# Patient Record
Sex: FEMALE | Race: WHITE | Employment: FULL TIME | URBAN - METROPOLITAN AREA
[De-identification: names, ages, dates, MRNs, and addresses within clinical notes are randomized per-mention and may not be internally consistent; named-entity substitution may affect disease eponyms.]

---

## 2016-10-20 LAB
ANTIBODY SCREEN, EXTERNAL: NEGATIVE
CHLAMYDIA, EXTERNAL: NEGATIVE
HBSAG, EXTERNAL: NEGATIVE
HIV, EXTERNAL: NEGATIVE
N. GONORRHEA, EXTERNAL: NEGATIVE
RPR, EXTERNAL: NORMAL
RUBELLA, EXTERNAL: NORMAL

## 2017-05-05 LAB — GRBS, EXTERNAL: NEGATIVE

## 2017-05-18 RX ORDER — FACIAL-BODY WIPES
10 EACH TOPICAL DAILY PRN
Status: CANCELLED | OUTPATIENT
Start: 2017-05-18

## 2017-05-18 RX ORDER — SODIUM CHLORIDE, SODIUM LACTATE, POTASSIUM CHLORIDE, CALCIUM CHLORIDE 600; 310; 30; 20 MG/100ML; MG/100ML; MG/100ML; MG/100ML
125 INJECTION, SOLUTION INTRAVENOUS CONTINUOUS
Status: CANCELLED | OUTPATIENT
Start: 2017-05-18 | End: 2017-05-19

## 2017-05-18 RX ORDER — OXYCODONE AND ACETAMINOPHEN 5; 325 MG/1; MG/1
1-2 TABLET ORAL
Status: CANCELLED | OUTPATIENT
Start: 2017-05-18

## 2017-05-18 RX ORDER — PROMETHAZINE HYDROCHLORIDE 25 MG/ML
25 INJECTION, SOLUTION INTRAMUSCULAR; INTRAVENOUS
Status: CANCELLED | OUTPATIENT
Start: 2017-05-18

## 2017-05-18 RX ORDER — OXYTOCIN/RINGER'S LACTATE 20/1000 ML
.5-2 PLASTIC BAG, INJECTION (ML) INTRAVENOUS CONTINUOUS
Status: CANCELLED | OUTPATIENT
Start: 2017-06-01

## 2017-05-18 RX ORDER — SODIUM CHLORIDE 0.9 % (FLUSH) 0.9 %
5-10 SYRINGE (ML) INJECTION EVERY 8 HOURS
Status: CANCELLED | OUTPATIENT
Start: 2017-05-18

## 2017-05-18 RX ORDER — ZOLPIDEM TARTRATE 5 MG/1
5 TABLET ORAL
Status: CANCELLED | OUTPATIENT
Start: 2017-05-18

## 2017-05-18 RX ORDER — SIMETHICONE 80 MG
80 TABLET,CHEWABLE ORAL
Status: CANCELLED | OUTPATIENT
Start: 2017-05-18

## 2017-05-18 RX ORDER — SODIUM CHLORIDE, SODIUM LACTATE, POTASSIUM CHLORIDE, CALCIUM CHLORIDE 600; 310; 30; 20 MG/100ML; MG/100ML; MG/100ML; MG/100ML
125 INJECTION, SOLUTION INTRAVENOUS CONTINUOUS
Status: CANCELLED | OUTPATIENT
Start: 2017-06-01

## 2017-05-18 RX ORDER — CEFAZOLIN SODIUM 2 G/50ML
2 SOLUTION INTRAVENOUS ONCE
Status: CANCELLED | OUTPATIENT
Start: 2017-06-01 | End: 2017-06-01

## 2017-05-18 RX ORDER — IBUPROFEN 400 MG/1
800 TABLET ORAL
Status: CANCELLED | OUTPATIENT
Start: 2017-05-21

## 2017-05-18 RX ORDER — ACETAMINOPHEN 325 MG/1
650 TABLET ORAL
Status: CANCELLED | OUTPATIENT
Start: 2017-05-18

## 2017-05-18 RX ORDER — SODIUM CHLORIDE 0.9 % (FLUSH) 0.9 %
5-10 SYRINGE (ML) INJECTION AS NEEDED
Status: CANCELLED | OUTPATIENT
Start: 2017-05-18

## 2017-05-18 RX ORDER — KETOROLAC TROMETHAMINE 30 MG/ML
30 INJECTION, SOLUTION INTRAMUSCULAR; INTRAVENOUS EVERY 6 HOURS
Status: CANCELLED | OUTPATIENT
Start: 2017-05-18 | End: 2017-05-20

## 2017-06-01 ENCOUNTER — ANESTHESIA (OUTPATIENT)
Dept: LABOR AND DELIVERY | Age: 32
End: 2017-06-01
Payer: COMMERCIAL

## 2017-06-01 ENCOUNTER — ANESTHESIA EVENT (OUTPATIENT)
Dept: LABOR AND DELIVERY | Age: 32
End: 2017-06-01
Payer: COMMERCIAL

## 2017-06-01 ENCOUNTER — HOSPITAL ENCOUNTER (INPATIENT)
Age: 32
LOS: 2 days | Discharge: HOME | End: 2017-06-03
Attending: OBSTETRICS & GYNECOLOGY | Admitting: OBSTETRICS & GYNECOLOGY
Payer: COMMERCIAL

## 2017-06-01 PROBLEM — O34.219 PREVIOUS CESAREAN SECTION COMPLICATING PREGNANCY: Status: ACTIVE | Noted: 2017-06-01

## 2017-06-01 PROBLEM — D69.6 THROMBOCYTOPENIA (HCC): Status: ACTIVE | Noted: 2017-06-01

## 2017-06-01 LAB
ABO + RH BLD: NORMAL
BASOPHILS # BLD AUTO: 0 K/UL (ref 0–0.06)
BASOPHILS # BLD AUTO: 0 K/UL (ref 0–0.06)
BASOPHILS # BLD: 0 % (ref 0–2)
BASOPHILS # BLD: 0 % (ref 0–2)
BLOOD GROUP ANTIBODIES SERPL: NORMAL
DIFFERENTIAL METHOD BLD: ABNORMAL
DIFFERENTIAL METHOD BLD: ABNORMAL
EOSINOPHIL # BLD: 0.1 K/UL (ref 0–0.4)
EOSINOPHIL # BLD: 0.2 K/UL (ref 0–0.4)
EOSINOPHIL NFR BLD: 1 % (ref 0–5)
EOSINOPHIL NFR BLD: 2 % (ref 0–5)
ERYTHROCYTE [DISTWIDTH] IN BLOOD BY AUTOMATED COUNT: 14.3 % (ref 11.6–14.5)
ERYTHROCYTE [DISTWIDTH] IN BLOOD BY AUTOMATED COUNT: 14.4 % (ref 11.6–14.5)
HCT VFR BLD AUTO: 34.3 % (ref 35–45)
HCT VFR BLD AUTO: 35.3 % (ref 35–45)
HGB BLD-MCNC: 11.7 G/DL (ref 12–16)
HGB BLD-MCNC: 12.1 G/DL (ref 12–16)
LYMPHOCYTES # BLD AUTO: 14 % (ref 21–52)
LYMPHOCYTES # BLD AUTO: 8 % (ref 21–52)
LYMPHOCYTES # BLD: 1 K/UL (ref 0.9–3.6)
LYMPHOCYTES # BLD: 1.6 K/UL (ref 0.9–3.6)
MCH RBC QN AUTO: 32.1 PG (ref 24–34)
MCH RBC QN AUTO: 32.5 PG (ref 24–34)
MCHC RBC AUTO-ENTMCNC: 34.1 G/DL (ref 31–37)
MCHC RBC AUTO-ENTMCNC: 34.3 G/DL (ref 31–37)
MCV RBC AUTO: 94.2 FL (ref 74–97)
MCV RBC AUTO: 94.9 FL (ref 74–97)
MONOCYTES # BLD: 0.2 K/UL (ref 0.05–1.2)
MONOCYTES # BLD: 0.6 K/UL (ref 0.05–1.2)
MONOCYTES NFR BLD AUTO: 2 % (ref 3–10)
MONOCYTES NFR BLD AUTO: 6 % (ref 3–10)
NEUTS SEG # BLD: 11.3 K/UL (ref 1.8–8)
NEUTS SEG # BLD: 8.9 K/UL (ref 1.8–8)
NEUTS SEG NFR BLD AUTO: 78 % (ref 40–73)
NEUTS SEG NFR BLD AUTO: 89 % (ref 40–73)
PLATELET # BLD AUTO: 67 K/UL (ref 135–420)
PLATELET # BLD AUTO: 71 K/UL (ref 135–420)
PMV BLD AUTO: 10.3 FL (ref 9.2–11.8)
PMV BLD AUTO: 10.6 FL (ref 9.2–11.8)
RBC # BLD AUTO: 3.64 M/UL (ref 4.2–5.3)
RBC # BLD AUTO: 3.72 M/UL (ref 4.2–5.3)
SPECIMEN EXP DATE BLD: NORMAL
WBC # BLD AUTO: 11.4 K/UL (ref 4.6–13.2)
WBC # BLD AUTO: 12.6 K/UL (ref 4.6–13.2)

## 2017-06-01 PROCEDURE — 74011250636 HC RX REV CODE- 250/636

## 2017-06-01 PROCEDURE — 86900 BLOOD TYPING SEROLOGIC ABO: CPT | Performed by: OBSTETRICS & GYNECOLOGY

## 2017-06-01 PROCEDURE — 77030031139 HC SUT VCRL2 J&J -A: Performed by: OBSTETRICS & GYNECOLOGY

## 2017-06-01 PROCEDURE — 77030032490 HC SLV COMPR SCD KNE COVD -B: Performed by: OBSTETRICS & GYNECOLOGY

## 2017-06-01 PROCEDURE — 77030011640 HC PAD GRND REM COVD -A: Performed by: OBSTETRICS & GYNECOLOGY

## 2017-06-01 PROCEDURE — 59025 FETAL NON-STRESS TEST: CPT

## 2017-06-01 PROCEDURE — 74011250636 HC RX REV CODE- 250/636: Performed by: ANESTHESIOLOGY

## 2017-06-01 PROCEDURE — 74011250636 HC RX REV CODE- 250/636: Performed by: OBSTETRICS & GYNECOLOGY

## 2017-06-01 PROCEDURE — 74011000250 HC RX REV CODE- 250

## 2017-06-01 PROCEDURE — 77030008462 HC STPLR SKN PROX J&J -A: Performed by: OBSTETRICS & GYNECOLOGY

## 2017-06-01 PROCEDURE — 76010000391 HC C SECN FIRST 1 HR: Performed by: OBSTETRICS & GYNECOLOGY

## 2017-06-01 PROCEDURE — 74011250637 HC RX REV CODE- 250/637

## 2017-06-01 PROCEDURE — 77030013079 HC BLNKT BAIR HGGR 3M -A: Performed by: ANESTHESIOLOGY

## 2017-06-01 PROCEDURE — 36415 COLL VENOUS BLD VENIPUNCTURE: CPT | Performed by: OBSTETRICS & GYNECOLOGY

## 2017-06-01 PROCEDURE — 85025 COMPLETE CBC W/AUTO DIFF WBC: CPT | Performed by: OBSTETRICS & GYNECOLOGY

## 2017-06-01 PROCEDURE — 76060000032 HC ANESTHESIA 0.5 TO 1 HR: Performed by: OBSTETRICS & GYNECOLOGY

## 2017-06-01 PROCEDURE — 75410000003 HC RECOV DEL/VAG/CSECN EA 0.5 HR

## 2017-06-01 PROCEDURE — 77030008683 HC TU ET CUF COVD -A: Performed by: ANESTHESIOLOGY

## 2017-06-01 PROCEDURE — 77030034849

## 2017-06-01 PROCEDURE — 77030033269 HC SLV COMPR SCD KNE2 CARD -B

## 2017-06-01 PROCEDURE — 75410000003 HC RECOV DEL/VAG/CSECN EA 0.5 HR: Performed by: OBSTETRICS & GYNECOLOGY

## 2017-06-01 PROCEDURE — 65270000029 HC RM PRIVATE

## 2017-06-01 RX ORDER — SODIUM CHLORIDE 0.9 % (FLUSH) 0.9 %
5-10 SYRINGE (ML) INJECTION AS NEEDED
Status: DISCONTINUED | OUTPATIENT
Start: 2017-06-01 | End: 2017-06-03

## 2017-06-01 RX ORDER — PROPOFOL 10 MG/ML
INJECTION, EMULSION INTRAVENOUS AS NEEDED
Status: DISCONTINUED | OUTPATIENT
Start: 2017-06-01 | End: 2017-06-01 | Stop reason: HOSPADM

## 2017-06-01 RX ORDER — FACIAL-BODY WIPES
10 EACH TOPICAL
Status: DISCONTINUED | OUTPATIENT
Start: 2017-06-01 | End: 2017-06-03 | Stop reason: HOSPADM

## 2017-06-01 RX ORDER — OXYTOCIN/RINGER'S LACTATE 20/1000 ML
PLASTIC BAG, INJECTION (ML) INTRAVENOUS
Status: DISCONTINUED | OUTPATIENT
Start: 2017-06-01 | End: 2017-06-01 | Stop reason: HOSPADM

## 2017-06-01 RX ORDER — SODIUM CHLORIDE, SODIUM LACTATE, POTASSIUM CHLORIDE, CALCIUM CHLORIDE 600; 310; 30; 20 MG/100ML; MG/100ML; MG/100ML; MG/100ML
125 INJECTION, SOLUTION INTRAVENOUS CONTINUOUS
Status: DISPENSED | OUTPATIENT
Start: 2017-06-01 | End: 2017-06-02

## 2017-06-01 RX ORDER — SODIUM CHLORIDE, SODIUM LACTATE, POTASSIUM CHLORIDE, CALCIUM CHLORIDE 600; 310; 30; 20 MG/100ML; MG/100ML; MG/100ML; MG/100ML
125 INJECTION, SOLUTION INTRAVENOUS CONTINUOUS
Status: DISCONTINUED | OUTPATIENT
Start: 2017-06-01 | End: 2017-06-03 | Stop reason: HOSPADM

## 2017-06-01 RX ORDER — OXYTOCIN/RINGER'S LACTATE 20/1000 ML
125 PLASTIC BAG, INJECTION (ML) INTRAVENOUS CONTINUOUS
Status: DISCONTINUED | OUTPATIENT
Start: 2017-06-01 | End: 2017-06-03 | Stop reason: HOSPADM

## 2017-06-01 RX ORDER — SODIUM CHLORIDE 0.9 % (FLUSH) 0.9 %
5-10 SYRINGE (ML) INJECTION EVERY 8 HOURS
Status: DISCONTINUED | OUTPATIENT
Start: 2017-06-01 | End: 2017-06-03

## 2017-06-01 RX ORDER — TRISODIUM CITRATE DIHYDRATE AND CITRIC ACID MONOHYDRATE 500; 334 MG/5ML; MG/5ML
SOLUTION ORAL AS NEEDED
Status: DISCONTINUED | OUTPATIENT
Start: 2017-06-01 | End: 2017-06-01 | Stop reason: HOSPADM

## 2017-06-01 RX ORDER — ONDANSETRON 2 MG/ML
INJECTION INTRAMUSCULAR; INTRAVENOUS
Status: DISPENSED
Start: 2017-06-01 | End: 2017-06-02

## 2017-06-01 RX ORDER — FENTANYL CITRATE 50 UG/ML
INJECTION, SOLUTION INTRAMUSCULAR; INTRAVENOUS AS NEEDED
Status: DISCONTINUED | OUTPATIENT
Start: 2017-06-01 | End: 2017-06-01 | Stop reason: HOSPADM

## 2017-06-01 RX ORDER — VALACYCLOVIR HYDROCHLORIDE 1 G/1
TABLET, FILM COATED ORAL DAILY
COMMUNITY
End: 2017-06-03

## 2017-06-01 RX ORDER — OXYCODONE AND ACETAMINOPHEN 5; 325 MG/1; MG/1
1-2 TABLET ORAL
Status: DISCONTINUED | OUTPATIENT
Start: 2017-06-01 | End: 2017-06-03 | Stop reason: HOSPADM

## 2017-06-01 RX ORDER — DEXTROSE 50 % IN WATER (D50W) INTRAVENOUS SYRINGE
25-50 AS NEEDED
Status: CANCELLED | OUTPATIENT
Start: 2017-06-01

## 2017-06-01 RX ORDER — ALBUTEROL SULFATE 0.83 MG/ML
2.5 SOLUTION RESPIRATORY (INHALATION) AS NEEDED
Status: CANCELLED | OUTPATIENT
Start: 2017-06-01

## 2017-06-01 RX ORDER — MAGNESIUM SULFATE 100 %
4 CRYSTALS MISCELLANEOUS AS NEEDED
Status: CANCELLED | OUTPATIENT
Start: 2017-06-01

## 2017-06-01 RX ORDER — PROMETHAZINE HYDROCHLORIDE 25 MG/ML
25 INJECTION, SOLUTION INTRAMUSCULAR; INTRAVENOUS
Status: DISCONTINUED | OUTPATIENT
Start: 2017-06-01 | End: 2017-06-03 | Stop reason: HOSPADM

## 2017-06-01 RX ORDER — KETAMINE HYDROCHLORIDE 50 MG/ML
INJECTION, SOLUTION INTRAMUSCULAR; INTRAVENOUS AS NEEDED
Status: DISCONTINUED | OUTPATIENT
Start: 2017-06-01 | End: 2017-06-01 | Stop reason: HOSPADM

## 2017-06-01 RX ORDER — ONDANSETRON 2 MG/ML
4 INJECTION INTRAMUSCULAR; INTRAVENOUS ONCE
Status: COMPLETED | OUTPATIENT
Start: 2017-06-01 | End: 2017-06-01

## 2017-06-01 RX ORDER — SODIUM CHLORIDE 0.9 % (FLUSH) 0.9 %
5-10 SYRINGE (ML) INJECTION AS NEEDED
Status: CANCELLED | OUTPATIENT
Start: 2017-06-01

## 2017-06-01 RX ORDER — HYDROCODONE BITARTRATE AND ACETAMINOPHEN 5; 325 MG/1; MG/1
1 TABLET ORAL AS NEEDED
Status: CANCELLED | OUTPATIENT
Start: 2017-06-01

## 2017-06-01 RX ORDER — KETOROLAC TROMETHAMINE 30 MG/ML
30 INJECTION, SOLUTION INTRAMUSCULAR; INTRAVENOUS EVERY 6 HOURS
Status: DISCONTINUED | OUTPATIENT
Start: 2017-06-01 | End: 2017-06-03 | Stop reason: HOSPADM

## 2017-06-01 RX ORDER — ACETAMINOPHEN 325 MG/1
650 TABLET ORAL
Status: DISCONTINUED | OUTPATIENT
Start: 2017-06-01 | End: 2017-06-03 | Stop reason: HOSPADM

## 2017-06-01 RX ORDER — LIDOCAINE HYDROCHLORIDE 20 MG/ML
INJECTION, SOLUTION EPIDURAL; INFILTRATION; INTRACAUDAL; PERINEURAL AS NEEDED
Status: DISCONTINUED | OUTPATIENT
Start: 2017-06-01 | End: 2017-06-01 | Stop reason: HOSPADM

## 2017-06-01 RX ORDER — NALOXONE HYDROCHLORIDE 0.4 MG/ML
0.1 INJECTION, SOLUTION INTRAMUSCULAR; INTRAVENOUS; SUBCUTANEOUS AS NEEDED
Status: DISCONTINUED | OUTPATIENT
Start: 2017-06-01 | End: 2017-06-03 | Stop reason: HOSPADM

## 2017-06-01 RX ORDER — ONDANSETRON 2 MG/ML
INJECTION INTRAMUSCULAR; INTRAVENOUS AS NEEDED
Status: DISCONTINUED | OUTPATIENT
Start: 2017-06-01 | End: 2017-06-01 | Stop reason: HOSPADM

## 2017-06-01 RX ORDER — ZOLPIDEM TARTRATE 5 MG/1
5 TABLET ORAL
Status: DISCONTINUED | OUTPATIENT
Start: 2017-06-01 | End: 2017-06-03 | Stop reason: HOSPADM

## 2017-06-01 RX ORDER — SIMETHICONE 80 MG
80 TABLET,CHEWABLE ORAL
Status: DISCONTINUED | OUTPATIENT
Start: 2017-06-01 | End: 2017-06-03 | Stop reason: HOSPADM

## 2017-06-01 RX ORDER — FENTANYL CITRATE 50 UG/ML
25 INJECTION, SOLUTION INTRAMUSCULAR; INTRAVENOUS
Status: DISCONTINUED | OUTPATIENT
Start: 2017-06-01 | End: 2017-06-03 | Stop reason: HOSPADM

## 2017-06-01 RX ORDER — DIPHENHYDRAMINE HYDROCHLORIDE 50 MG/ML
12.5 INJECTION, SOLUTION INTRAMUSCULAR; INTRAVENOUS
Status: DISCONTINUED | OUTPATIENT
Start: 2017-06-01 | End: 2017-06-03 | Stop reason: HOSPADM

## 2017-06-01 RX ORDER — DEXAMETHASONE SODIUM PHOSPHATE 4 MG/ML
10 INJECTION, SOLUTION INTRA-ARTICULAR; INTRALESIONAL; INTRAMUSCULAR; INTRAVENOUS; SOFT TISSUE ONCE
Status: COMPLETED | OUTPATIENT
Start: 2017-06-01 | End: 2017-06-01

## 2017-06-01 RX ORDER — KETOROLAC TROMETHAMINE 30 MG/ML
INJECTION, SOLUTION INTRAMUSCULAR; INTRAVENOUS AS NEEDED
Status: DISCONTINUED | OUTPATIENT
Start: 2017-06-01 | End: 2017-06-01 | Stop reason: HOSPADM

## 2017-06-01 RX ORDER — INSULIN LISPRO 100 [IU]/ML
INJECTION, SOLUTION INTRAVENOUS; SUBCUTANEOUS ONCE
Status: CANCELLED | OUTPATIENT
Start: 2017-06-01 | End: 2017-06-02

## 2017-06-01 RX ORDER — SODIUM CHLORIDE, SODIUM LACTATE, POTASSIUM CHLORIDE, CALCIUM CHLORIDE 600; 310; 30; 20 MG/100ML; MG/100ML; MG/100ML; MG/100ML
150 INJECTION, SOLUTION INTRAVENOUS CONTINUOUS
Status: CANCELLED | OUTPATIENT
Start: 2017-06-01

## 2017-06-01 RX ORDER — METOCLOPRAMIDE HYDROCHLORIDE 5 MG/ML
INJECTION INTRAMUSCULAR; INTRAVENOUS AS NEEDED
Status: DISCONTINUED | OUTPATIENT
Start: 2017-06-01 | End: 2017-06-01 | Stop reason: HOSPADM

## 2017-06-01 RX ORDER — DIPHENHYDRAMINE HYDROCHLORIDE 50 MG/ML
12.5 INJECTION, SOLUTION INTRAMUSCULAR; INTRAVENOUS
Status: CANCELLED | OUTPATIENT
Start: 2017-06-01

## 2017-06-01 RX ORDER — CEFAZOLIN SODIUM 2 G/50ML
2 SOLUTION INTRAVENOUS ONCE
Status: COMPLETED | OUTPATIENT
Start: 2017-06-01 | End: 2017-06-01

## 2017-06-01 RX ORDER — IBUPROFEN 400 MG/1
800 TABLET ORAL
Status: DISCONTINUED | OUTPATIENT
Start: 2017-06-04 | End: 2017-06-03 | Stop reason: HOSPADM

## 2017-06-01 RX ORDER — SUCCINYLCHOLINE CHLORIDE 20 MG/ML
INJECTION INTRAMUSCULAR; INTRAVENOUS AS NEEDED
Status: DISCONTINUED | OUTPATIENT
Start: 2017-06-01 | End: 2017-06-01 | Stop reason: HOSPADM

## 2017-06-01 RX ORDER — NALOXONE HYDROCHLORIDE 0.4 MG/ML
0.1 INJECTION, SOLUTION INTRAMUSCULAR; INTRAVENOUS; SUBCUTANEOUS AS NEEDED
Status: CANCELLED | OUTPATIENT
Start: 2017-06-01

## 2017-06-01 RX ADMIN — FENTANYL CITRATE 100 MCG: 50 INJECTION, SOLUTION INTRAMUSCULAR; INTRAVENOUS at 13:53

## 2017-06-01 RX ADMIN — PROPOFOL 180 MG: 10 INJECTION, EMULSION INTRAVENOUS at 13:16

## 2017-06-01 RX ADMIN — FENTANYL CITRATE 100 MCG: 50 INJECTION, SOLUTION INTRAMUSCULAR; INTRAVENOUS at 13:22

## 2017-06-01 RX ADMIN — KETOROLAC TROMETHAMINE 30 MG: 30 INJECTION, SOLUTION INTRAMUSCULAR at 21:16

## 2017-06-01 RX ADMIN — FENTANYL CITRATE 25 MCG: 50 INJECTION, SOLUTION INTRAMUSCULAR; INTRAVENOUS at 16:56

## 2017-06-01 RX ADMIN — DEXAMETHASONE SODIUM PHOSPHATE 10 MG: 4 INJECTION, SOLUTION INTRAMUSCULAR; INTRAVENOUS at 09:41

## 2017-06-01 RX ADMIN — Medication: at 14:08

## 2017-06-01 RX ADMIN — CEFAZOLIN SODIUM 2 G: 2 SOLUTION INTRAVENOUS at 13:09

## 2017-06-01 RX ADMIN — METOCLOPRAMIDE HYDROCHLORIDE 10 MG: 5 INJECTION INTRAMUSCULAR; INTRAVENOUS at 13:10

## 2017-06-01 RX ADMIN — TRISODIUM CITRATE DIHYDRATE AND CITRIC ACID MONOHYDRATE 30 ML: 500; 334 SOLUTION ORAL at 13:07

## 2017-06-01 RX ADMIN — KETAMINE HYDROCHLORIDE 30 MG: 50 INJECTION, SOLUTION INTRAMUSCULAR; INTRAVENOUS at 13:18

## 2017-06-01 RX ADMIN — LIDOCAINE HYDROCHLORIDE 70 MG: 20 INJECTION, SOLUTION EPIDURAL; INFILTRATION; INTRACAUDAL; PERINEURAL at 13:06

## 2017-06-01 RX ADMIN — ONDANSETRON 4 MG: 2 INJECTION INTRAMUSCULAR; INTRAVENOUS at 18:37

## 2017-06-01 RX ADMIN — HYDROMORPHONE HYDROCHLORIDE: 10 INJECTION, SOLUTION INTRAMUSCULAR; INTRAVENOUS; SUBCUTANEOUS at 14:05

## 2017-06-01 RX ADMIN — PROPOFOL 20 MG: 10 INJECTION, EMULSION INTRAVENOUS at 13:19

## 2017-06-01 RX ADMIN — SUCCINYLCHOLINE CHLORIDE 140 MG: 20 INJECTION INTRAMUSCULAR; INTRAVENOUS at 13:17

## 2017-06-01 RX ADMIN — SODIUM CHLORIDE, SODIUM LACTATE, POTASSIUM CHLORIDE, AND CALCIUM CHLORIDE 1000 ML: 600; 310; 30; 20 INJECTION, SOLUTION INTRAVENOUS at 11:46

## 2017-06-01 RX ADMIN — ONDANSETRON 4 MG: 2 INJECTION INTRAMUSCULAR; INTRAVENOUS at 13:27

## 2017-06-01 RX ADMIN — Medication: at 13:22

## 2017-06-01 RX ADMIN — SODIUM CHLORIDE, SODIUM LACTATE, POTASSIUM CHLORIDE, AND CALCIUM CHLORIDE 125 ML/HR: 600; 310; 30; 20 INJECTION, SOLUTION INTRAVENOUS at 18:33

## 2017-06-01 RX ADMIN — KETOROLAC TROMETHAMINE 30 MG: 30 INJECTION, SOLUTION INTRAMUSCULAR; INTRAVENOUS at 13:53

## 2017-06-01 RX ADMIN — FENTANYL CITRATE 150 MCG: 50 INJECTION, SOLUTION INTRAMUSCULAR; INTRAVENOUS at 13:29

## 2017-06-01 RX ADMIN — SODIUM CHLORIDE, SODIUM LACTATE, POTASSIUM CHLORIDE, AND CALCIUM CHLORIDE: 600; 310; 30; 20 INJECTION, SOLUTION INTRAVENOUS at 13:00

## 2017-06-01 NOTE — PROGRESS NOTES
8752 patient taken from the waiting room to triage 3 for lab work prior to scheduled . 9925 Dr. August Naranjo paged. 9747 Dr. August Naranjo informed of platelets. Orders received for 10mg IVPUSH decadron once, CBC to be drawn at 1200    0910 patient informed of platelet levels. POC is to give her 10mg decadron IVPUSH now and recheck platelets at 7502.     0950 Dr. Kristen Chen at bedside. Risks and benefits of spinal anesthesia, Thromboelastogram, and waiting 24 hours. 9[address] Dr. August Naranjo called unit. MD informed of discussion with Dr. Kristen Chen, option to postpone section for 24 hours to do TEG. MD would like to do  today, patient will go under general if necessary. 1015 Patient informed of options, plan to do  today. 1 Dr. Kristen Chen text paged to plan for  as scheduled. 1122 patient ambulates to LD room 4. CHG wipes used on abdomen. 1215 patient resting, questions answered. Denies needs. 1258 Dr. August Naranjo at bedside. 1303 patient ambulates to OR 2    1438 Patient returned to LD room 4, from PACU.     1450  Patient eating ice chips, tolerating well, denies Nausea    1545 patient resting, infant feeding. Denies needs    1634 Dr. Michelle Quinn paged    2980 Dr. Michelle Quinn informed of pain 6/10 with PCA. Dr. Michelle Quinn states she is going to put in fentanyl IV push orders. 1735 pericare provided, pads changed. 1820 pericare provided. Pads changed    1840 TRANSFER - OUT REPORT:    Verbal report given to KOSTA Saldivar RN (name) on Arlene Dickerson  being transferred to [address] [place] [address] (unit) for routine progression of care       Report consisted of patients Situation, Background, Assessment and   Recommendations(SBAR). Information from the following report(s) SBAR, Kardex, Intake/Output and MAR was reviewed with the receiving nurse.     Lines:   Peripheral IV 17 Left Forearm (Active)   Site Assessment Clean, dry, & intact 2017  2:27 PM   Phlebitis Assessment 0 2017  2:27 PM Infiltration Assessment 0 6/1/2017  2:27 PM   Dressing Status Clean, dry, & intact 6/1/2017  2:27 PM   Dressing Type Tape 6/1/2017  2:27 PM   Hub Color/Line Status Infusing 6/1/2017  2:27 PM   Alcohol Cap Used Yes 6/1/2017  9:18 AM        Opportunity for questions and clarification was provided.       Patient transported with:   Registered Nurse

## 2017-06-01 NOTE — OP NOTES
Section Delivery Procedure Note    Name: Lisa Mathis   Medical Record Number: [id number]   YOB: 1985  Today's Date: 2017      Preoperative Diagnosis: PREVIOUS  SECTION    Postoperative Diagnosis: PREVIOUS  SECTION    Procedure: Low Cervical Transverse Procedure(s):  REPEAT  SECTION    Surgeon(s):  Jyoti Forte MD    Anesthesia: General    Prophylactic Antibiotics: Ancef pre-op  2 gm. Procedure Details:    Patient was induced under general anesthetic, placed supine, pillow under right hip, scrubbed and draped. She was checked for adequate anesthesia. Pfannenstiel incision was made in the skin, extended through the subcutaneous tissue and the anterior rectus sheath. The rectus muscles were  and a longitudinal incision was made in the parietal peritoneum preserving the bladder. Bladder blade was positioned. Transverse incision is made in the lower segment of the uterus after first carefully locating the position of the lower segment. Baby is delivered through this incision. No traction is placed upon the fetal head during this delivery. After a 20 seconds delay to allow equilibration of blood volume between baby and placenta, cord is clamped and cut. Baby is handed off to waiting pediatric expert, after initial resuscitation with bulb suction while we waited for the cord to be clamped and cut. Placenta was now delivered manually, and the uterus was delivered out of the pelvic cavity and packs placed to prevent blood flow upwards. Uterine cavity was carefully cleaned with a dry pack and inspected to be sure that it was empty. Myometrium was closed in two layers, the first running locking and the second running imbricating. A further suture was placed in the serosa to hold the incision together and to achieve hemostasis and close the serosa.  Once we were sure that hemostasis had been achieved, the uterus was returned to the peritoneal cavity. Blood and clot was cleared from peritoneum. Sponge and instrument count was now obtained, and after this was returned as normal, we closed the parietal peritoneum with a running suture. Careful hemostasis was achieved in the rectus sheath, and this was closed with a running suture. Careful hemostasis was obtained with cautery in the subcutaneous tissue, subcutaneous suture placed, and the skin was closed with absaorbable staples. Sterile dressing was applied, patient was recovered and sent to recovery room in good condition. Note: all sutures used throughout were number 1 Vicryl. Sponge and needle correct postop as per nursing staff.         Estimated Blood Loss: 500  Replacement: 0    Fetal Description: hood female, yomi breech    Apgar - One Minute: 7    Apgar - Five Minutes: 8    Umbilical Cord: 3 vessels present             Cord Blood Results:   Information for the patient's :  Guillermina Gonzalez [[id number]]   No results found for: PCTABR, PCTDIG, BILI, ABORH         Birth Information:   Information for the patient's :  Guillermina Gonzalez [[id number]]   One Minute Apgar:  (Filed from Delivery Summary)  Five  Minute Apgar:  (Filed from Delivery Summary)      Specimens: * No specimens in log *       Maternal Findings    Uterus Size: normal, Fibroids: no , Adhesions: {Minimal,    Tubes normal   Ovaries normal   Abdomen Adhesions: None   Subcutaneous thickness 2 cm            Complications:  none    Birth Weight: 7lb 8oz    Mother's Condition: good  Baby's Condition: good    Signed: Codie Billings MD      2017

## 2017-06-01 NOTE — PROGRESS NOTES
Bedside shift change report given to VANIA Flor RN (oncoming nurse) by KOSTA Kulkarni RN (offgoing nurse). Report included the following information SBAR and Kardex.

## 2017-06-01 NOTE — IP AVS SNAPSHOT
Current Discharge Medication List  
  
ASK your doctor about these medications Dose & Instructions Dispensing Information Comments Morning Noon Evening Bedtime  
 cyanocobalamin 100 mcg tablet Commonly known as:  VITAMIN B12 Your last dose was: Your next dose is:    
   
   
 Dose:  100 mcg Take 100 mcg by mouth daily. Indications: PREVENTION OF VITAMIN B12 DEFICIENCY Refills:  0  
     
   
   
   
  
 ferrous sulfate 325 mg (65 mg iron) tablet Your last dose was: Your next dose is:    
   
   
 Dose:  325 mg Take 325 mg by mouth two (2) times a day. Indications: IRON DEFICIENCY ANEMIA Refills:  0  
     
   
   
   
  
 OTHER Your last dose was: Your next dose is: Take  by mouth. Refills:  0  
     
   
   
   
  
 PNV with Ca No.65-Iron Poly-FA 60 mg iron-1 mg Cap Your last dose was: Your next dose is:    
   
   
 Dose:  1 Tab Take 1 Tab by mouth daily. Indications: PREGNANCY Refills:  0 VALTREX 1 gram tablet Generic drug:  valACYclovir Your last dose was: Your next dose is: Take  by mouth daily. Refills:  0  
     
   
   
   
  
 VITAMIN C 500 mg tablet Generic drug:  ascorbic acid (vitamin C) Your last dose was: Your next dose is:    
   
   
 Dose:  500 mg Take 500 mg by mouth daily. Refills:  0  
     
   
   
   
  
 VITAMIN D3 2,000 unit Tab Generic drug:  cholecalciferol (vitamin D3) Your last dose was: Your next dose is:    
   
   
 Dose:  1 Tab Take 1 Tab by mouth. Indications: PREVENTION OF VITAMIN D DEFICIENCY Refills:  0

## 2017-06-01 NOTE — H&P
Ostetrical History and Physical    Subjective:     Date of Admission: 2017    Patient is a 36 y.o.   female admitted with previous CS for elective repeat. Note low platelet count. For Obstetric history, see prenantal.    For Review of Systems, see prenatal    Past Medical History:   Diagnosis Date    Abnormal Papanicolaou smear of cervix     Anemia     Coagulation disorder (HCC)     thrombocytopenia with pregnancy's    Disease of blood and blood forming organ     thrombocytopenia    Fetal heart disease 2017    GERD (gastroesophageal reflux disease)     with pregnancy      Past Surgical History:   Procedure Laterality Date     DELIVERY ONLY      HX GYN  10/2015        HX HEENT      T&A      Prior to Admission medications    Medication Sig Start Date End Date Taking? Authorizing Provider   valACYclovir (VALTREX) 1 gram tablet Take  by mouth daily. Yes Historical Provider   OTHER Take  by mouth. Yes Historical Provider   PNV with Ca No.65-Iron Poly-FA 60 mg iron-1 mg cap Take 1 Tab by mouth daily. Indications: PREGNANCY   Yes Historical Provider   ferrous sulfate 325 mg (65 mg iron) tablet Take 325 mg by mouth two (2) times a day. Indications: IRON DEFICIENCY ANEMIA   Yes Historical Provider   cyanocobalamin (VITAMIN B12) 100 mcg tablet Take 100 mcg by mouth daily. Indications: PREVENTION OF VITAMIN B12 DEFICIENCY   Yes Historical Provider   cholecalciferol, vitamin D3, (VITAMIN D3) 2,000 unit tab Take 1 Tab by mouth. Indications: PREVENTION OF VITAMIN D DEFICIENCY   Yes Historical Provider   ascorbic acid (VITAMIN C) 500 mg tablet Take 500 mg by mouth daily. Yes Historical Provider     Allergies   Allergen Reactions    Morphine Hives      Social History   Substance Use Topics    Smoking status: Never Smoker    Smokeless tobacco: Never Used    Alcohol use No      History reviewed. No pertinent family history.        Objective:     Blood pressure 125/73, pulse 90, temperature 98.1 °F (36.7 °C), resp. rate 18, height 5' 7.5\" (1.715 m), weight 97.5 kg (215 lb), SpO2 100 %, not currently breastfeeding. Temp (24hrs), Av.1 °F (36.7 °C), Min:98.1 °F (36.7 °C), Max:98.1 °F (36.7 °C)                HEENT: No pallor, no jaundice, Thyroid and throat normal  RESPIRATORY: Clear to A & P  CVS: pulse reg, HS normal  ABDOMEN: Gravid. Vertex. EFW=7.5lb +-1lb. No abnormal tenderness. Pelvic: deferred    Data Review:   Recent Results (from the past 24 hour(s))   CBC WITH AUTOMATED DIFF    Collection Time: 17  8:34 AM   Result Value Ref Range    WBC 11.4 4.6 - 13.2 K/uL    RBC 3.72 (L) 4.20 - 5.30 M/uL    HGB 12.1 12.0 - 16.0 g/dL    HCT 35.3 35.0 - 45.0 %    MCV 94.9 74.0 - 97.0 FL    MCH 32.5 24.0 - 34.0 PG    MCHC 34.3 31.0 - 37.0 g/dL    RDW 14.4 11.6 - 14.5 %    PLATELET 67 (L) 527 - 420 K/uL    MPV 10.3 9.2 - 11.8 FL    NEUTROPHILS 78 (H) 40 - 73 %    LYMPHOCYTES 14 (L) 21 - 52 %    MONOCYTES 6 3 - 10 %    EOSINOPHILS 2 0 - 5 %    BASOPHILS 0 0 - 2 %    ABS. NEUTROPHILS 8.9 (H) 1.8 - 8.0 K/UL    ABS. LYMPHOCYTES 1.6 0.9 - 3.6 K/UL    ABS. MONOCYTES 0.6 0.05 - 1.2 K/UL    ABS. EOSINOPHILS 0.2 0.0 - 0.4 K/UL    ABS.  BASOPHILS 0.0 0.0 - 0.06 K/UL    DF AUTOMATED     TYPE & SCREEN    Collection Time: 17  8:34 AM   Result Value Ref Range    Crossmatch Expiration 2017     ABO/Rh(D) Dona Kariich POSITIVE     Antibody screen NEG      Monitor:  Reactivity:present Variability:present Baseline:within normal limits    Assessment:     Principal Problem:    Previous  section complicating pregnancy (9249)    Active Problems:    39 weeks gestation of pregnancy (10/9/2015)      IUP (intrauterine pregnancy), incidental (10/11/2015)      Thrombocytopenia ([facility] [address] (2017)      Temporary low platelet count ([facility] [address] (10/9/2015)        Plan:   Repeat CS, may need GA    Check labs:  CBC  Check  Prenatal:    Disposition:     Type of admit:In-Patient    I saw and examined patient.     Signed By: Antoni Alston MD                         June 1, 2017

## 2017-06-01 NOTE — IP AVS SNAPSHOT
[address] [address] 
[phone] Patient: Jaycee Warren MRN: [id number] :[id number] You are allergic to the following Allergen Reactions Morphine Hives Recent Documentation Height Weight Breastfeeding? BMI OB Status Smoking Status 1.715 m 97.5 kg Unknown 33.18 kg/m2 Recent pregnancy Never Smoker Emergency Contacts Name Discharge Info Relation Home Work Mobile Ángel Pugh  Spouse [3]   [phone] [facility] MARTIN  Parent [1] [phone] About your hospitalization You were admitted on:  2017 You last received care in the:  [address] [place] [address] You were discharged on:  Anne 3, 2017 Unit phone number:  [phone] Why you were hospitalized Your primary diagnosis was:  Previous  Section Complicating Pregnancy Your diagnoses also included:  Iup (Intrauterine Pregnancy), Incidental, Temporary Low Platelet Count (Hcc), Thrombocytopenia (Hcc), 39 Weeks Gestation Of Pregnancy Providers Seen During Your Hospitalizations Provider Role Specialty Primary office phone Sean Vick MD Attending Provider Obstetrics & Gynecology [phone] Your Primary Care Physician (PCP) Primary Care Physician Office Phone Office Fax OTHER, PHYS ** None ** ** None ** Follow-up Information None Current Discharge Medication List  
  
ASK your doctor about these medications Dose & Instructions Dispensing Information Comments Morning Noon Evening Bedtime  
 cyanocobalamin 100 mcg tablet Commonly known as:  VITAMIN B12 Your last dose was: Your next dose is:    
   
   
 Dose:  100 mcg Take 100 mcg by mouth daily. Indications: PREVENTION OF VITAMIN B12 DEFICIENCY Refills:  0  
     
   
   
   
  
 ferrous sulfate 325 mg (65 mg iron) tablet Your last dose was: Your next dose is:    
   
   
 Dose:  325 mg Take 325 mg by mouth two (2) times a day. Indications: IRON DEFICIENCY ANEMIA Refills:  0  
     
   
   
   
  
 OTHER Your last dose was: Your next dose is: Take  by mouth. Refills:  0  
     
   
   
   
  
 PNV with Ca No.65-Iron Poly-FA 60 mg iron-1 mg Cap Your last dose was: Your next dose is:    
   
   
 Dose:  1 Tab Take 1 Tab by mouth daily. Indications: PREGNANCY Refills:  0 VALTREX 1 gram tablet Generic drug:  valACYclovir Your last dose was: Your next dose is: Take  by mouth daily. Refills:  0  
     
   
   
   
  
 VITAMIN C 500 mg tablet Generic drug:  ascorbic acid (vitamin C) Your last dose was: Your next dose is:    
   
   
 Dose:  500 mg Take 500 mg by mouth daily. Refills:  0  
     
   
   
   
  
 VITAMIN D3 2,000 unit Tab Generic drug:  cholecalciferol (vitamin D3) Your last dose was: Your next dose is:    
   
   
 Dose:  1 Tab Take 1 Tab by mouth. Indications: PREVENTION OF VITAMIN D DEFICIENCY Refills:  0 Discharge Instructions Startup [employer] Activation Thank you for requesting access to Hit[employer]. Please follow the instructions below to securely access and download your online medical record. HitFix allows you to send messages to your doctor, view your test results, renew your prescriptions, schedule appointments, and more. How Do I Sign Up? 1. In your internet browser, go to [email or web address] 
2. Click on the First Time User? Click Here link in the Sign In box. You will be redirect to the New Member Sign Up page. 3. Enter your HitFix Access Code exactly as it appears below. You will not need to use this code after youve completed the sign-up process.  If you do not sign up before the expiration date, you must request a new code. [employer] Access Code: [email or web address] Expires: 2017  3:20 PM (This is the date your [employer] access code will ) 4. Enter the last four digits of your Social Security Number (xxxx) and Date of Birth (mm/dd/yyyy) as indicated and click Submit. You will be taken to the next sign-up page. 5. Create a [employer] ID. This will be your [employer] login ID and cannot be changed, so think of one that is secure and easy to remember. 6. Create a [employer] password. You can change your password at any time. 7. Enter your Password Reset Question and Answer. This can be used at a later time if you forget your password. 8. Enter your e-mail address. You will receive e-mail notification when new information is available in [address] 9. Click Sign Up. You can now view and download portions of your medical record. 10. Click the Download Summary menu link to download a portable copy of your medical information. Additional Information If you have questions, please visit the Frequently Asked Questions section of the [employer] website at [email or web address] Remember, [employer] is NOT to be used for urgent needs. For medical emergencies, dial 911. Patient armband removed and shredded Discharge Instructions Attachments/References DEPRESSION: POSTPARTUM (ENGLISH) PARENTING: STRESS AND INFANTS (ENGLISH) POSTPARTUM CARE (ENGLISH)  SECTION: POST-OP (ENGLISH) BREASTFEEDING (ENGLISH) BREASTFEEDING MOTHERS: NUTRITION (ENGLISH) BREASTFEEDING: HOW-TO (ENGLISH) FEEDING: FIRST YEAR: PEDIATRIC (ENGLISH) FEEDING: : PEDIATRIC (ENGLISH) Discharge Orders None Introducing [facility]! [facility] introduces [employer] patient portal. Now you can access parts of your medical record, email your doctor's office, and request medication refills online.    
 
1. In your internet browser, go to [email or web address] 2. Click on the First Time User? Click Here link in the Sign In box. You will see the New Member Sign Up page. 3. Enter your Grandis Access Code exactly as it appears below. You will not need to use this code after youve completed the sign-up process. If you do not sign up before the expiration date, you must request a new code. · [employer]is Access Code: [email or web address] Expires: 8/9/2017  3:20 PM 
 
4. Enter the last four digits of your Social Security Number (xxxx) and Date of Birth (mm/dd/yyyy) as indicated and click Submit. You will be taken to the next sign-up page. 5. Create a Grandis ID. This will be your Grandis login ID and cannot be changed, so think of one that is secure and easy to remember. 6. Create a Grandis password. You can change your password at any time. 7. Enter your Password Reset Question and Answer. This can be used at a later time if you forget your password. 8. Enter your e-mail address. You will receive e-mail notification when new information is available in [address] 9. Click Sign Up. You can now view and download portions of your medical record. 10. Click the Download Summary menu link to download a portable copy of your medical information. If you have questions, please visit the Frequently Asked Questions section of the Grandis website. Remember, [employer]is is NOT to be used for urgent needs. For medical emergencies, dial 911. Now available from your iPhone and Android! General Information Please provide this summary of care documentation to your next provider. Patient Signature:  ____________________________________________________________ Date:  ____________________________________________________________  
  
Avinash Cons Provider Signature:  ____________________________________________________________ Date:  ____________________________________________________________ More Information Depression After Childbirth: Care Instructions Your Care Instructions Many women get the \"baby blues\" during the first few days after childbirth. You may lose sleep, feel irritable, and cry easily. You may feel happy one minute and sad the next. Hormone changes are one cause of these emotional changes. Also, the demands of a new baby, along with visits from relatives or other family needs, add to a mother's stress. The \"baby blues\" often peak around the fourth day. Then they ease up in less than 2 weeks. If your moodiness or anxiety lasts for more than 2 weeks, or if you feel like life is not worth living, you may have postpartum depression. This is different for each mother. Some mothers with serious depression may worry intensely about their infant's well-being. Others may feel distant from their child. Some mothers might even feel that they might harm their baby. A mother may have signs of paranoia, wondering if someone is watching her. Depression is not a sign of weakness. It is a medical condition that requires treatment. Medicine and counseling often work well to reduce depression. Talk to your doctor about taking antidepressant medicine while breastfeeding. Follow-up care is a key part of your treatment and safety. Be sure to make and go to all appointments, and call your doctor if you are having problems. It's also a good idea to know your test results and keep a list of the medicines you take. How do you know if you are depressed? With all the changes in your life, you may not know if you are depressed. Pregnancy sometimes causes changes in how you feel that are similar to the symptoms of depression. Symptoms of depression include: · Feeling sad or hopeless and losing interest in daily activities. These are the most common symptoms of depression. · Sleeping too much or not enough. · Feeling tired. You may feel as if you have no energy. · Eating too much or too little. · Writing or talking about death, such as writing suicide notes or talking about guns, knives, or pills. Keep the numbers for these national suicide hotlines: 4-051-900-TALK (0-685.392.1307) and 1-182-CAUTRZD (2-695.767.7345). If you or someone you know talks about suicide or feeling hopeless, get help right away. How can you care for yourself at home? · Be safe with medicines. Take your medicines exactly as prescribed. Call your doctor if you think you are having a problem with your medicine. · Eat a healthy diet so that you can keep up your energy. · Get regular daily exercise, such as walks, to help improve your mood. · Get as much sunlight as possible. Keep your shades and curtains open. Get outside as much as you can. · Avoid using alcohol or other substances to feel better. · Get as much rest and sleep as possible. Avoid doing too much. Being too tired can increase depression. · Play stimulating music throughout your day and soothing music at night. · Schedule outings and visits with friends and family. Ask them to call you regularly, so that you do not feel alone. · Ask for help with preparing food and other daily tasks. Family and friends are often happy to help a mother with a . · Be honest with yourself and those who care about you. Tell them about your struggle. · Join a support group of new mothers. No one can better understand the challenges of caring for a  than other new mothers. · If you feel like life is not worth living or are feeling hopeless, get help right away. Keep the numbers for these national suicide hotlines: -TALK (1-196.745.4572) and 3-924-DTDIHMB (4-161.695.3654). When should you call for help? Call 911 anytime you think you may need emergency care. For example, call if: 
· You feel you cannot stop from hurting yourself, your baby, or someone else. Call your doctor now or seek immediate medical care if: · You are having trouble caring for yourself or your baby. · You hear voices. Watch closely for changes in your health, and be sure to contact your doctor if: 
· You have problems with your depression medicine. · You do not get better as expected. Where can you learn more? Go to http://danna-rupesh.info/. Enter I234 in the search box to learn more about \"Depression After Childbirth: Care Instructions. \" Current as of: July 26, 2016 Content Version: 11.2 © 8602-5029 [employer]. Care instructions adapted under license by Hyperpublic (which disclaims liability or warranty for this information). If you have questions about a medical condition or this instruction, always ask your healthcare professional. [address] any warranty or liability for your use of this information. Stress in Parents of Infants: Care Instructions Your Care Instructions Meeting the increased demands of being a new parent can be a big challenge. It is easy to get overtired and overwhelmed during the first weeks. What used to be a simple chore, such as buying groceries, is not so simple now. Plus, you have new chores, including feeding and changing your new baby. At the end of the day, you may be so tired that you feel like crying. Instead of looking forward to the next day, you may be dreading tomorrow. Like many new parents, you are burned out from the stress of having a new baby. Stress affects each of us differently, and the most effective ways to relieve it are different for each person. You can try different methods to find out which ones work best for you. As the weeks go by, you will begin to develop a rhythm with your baby. Tasks that now seem to take forever will become easier. Many women get the \"baby blues\" during the first few days after childbirth.  If you are a new mother and the \"baby blues\" last more than a few days, call your doctor right away. Depression is a medical condition that requires treatment. Follow-up care is a key part of your treatment and safety. Be sure to make and go to all appointments, and call your doctor if you are having problems. It's also a good idea to know your test results and keep a list of the medicines you take. How can you care for yourself at home? · Be kind to yourself. Your new baby takes a lot of work, but he or she can give you a lot of pleasure too. Do not worry about housekeeping for a while. · Allow your friends to bring you meals or do chores. · Limit visitors to as few as you feel you can handle, or ask them not to visit for a while. Before they come, set a limit on how long they will stay. · Sleep when your baby sleeps. Even a short nap helps. · Find what triggers your stress, and avoid those things as much as you can. · If you breastfeed, learn how to collect and store some breast milk so your partner or  can feed the baby while you sleep. · Eat a balanced diet so you can keep up your energy. · Drink plenty of fluids throughout the day. · Avoid caffeine and alcohol. Caffeine is found in coffee, tea, cola drinks, chocolate, and other foods. · Limit medicines that can make you more tired, such as tranquilizers and cold and allergy medicines. · Get regular daily exercise, such as walks, to help improve your mood. Rest after you exercise. · Be honest with yourself and those who care about you. Tell them you are stressed and tired. · Talking to other new parents can help. Ask your doctor or child's doctor to suggest support groups for new parents. Hearing that someone else is having the same experiences you are can help a lot. · If you have the baby blues for more than a few days, call your doctor right away. When should you call for help? Call 911 anytime you think you may need emergency care. For example, call if: · You have thoughts of hurting yourself, your baby, or another person. Call your doctor now or seek immediate medical care if: 
· You are having trouble caring for yourself or your baby. Watch closely for changes in your health, and be sure to contact your doctor if you have any problems. Where can you learn more? Go to http://danna-rupesh.info/. Enter H142 in the search box to learn more about \"Stress in Parents of Infants: Care Instructions. \" Current as of: 2016 Content Version: 11.2 © 1018-6762 [employer]. Care instructions adapted under license by [employer] (which disclaims liability or warranty for this information). If you have questions about a medical condition or this instruction, always ask your healthcare professional. [address] any warranty or liability for your use of this information. Postpartum: Care Instructions Your Care Instructions After childbirth (postpartum period), your body goes through many changes. Some of these changes happen over several weeks. In the hours after delivery, your body will begin to recover from childbirth while it prepares to breastfeed your . You may feel emotional during this time. Your hormones can shift your mood without warning for no clear reason. In the first couple of weeks after childbirth, many women have emotions that change from happy to sad. You may find it hard to sleep. You may cry a lot. This is called the \"baby blues. \" These overwhelming emotions often go away within a couple of days or weeks. But it's important to discuss your feelings with your doctor. It is easy to get too tired and overwhelmed during the first weeks after childbirth. Don't try to do too much. Get rest whenever you can, accept help from others, and eat well and drink plenty of fluids.  
About 4 to 6 weeks after your baby's birth, you will have a follow-up visit with your doctor. This visit is your time to talk to your doctor about anything you are concerned or curious about. Follow-up care is a key part of your treatment and safety. Be sure to make and go to all appointments, and call your doctor if you are having problems. It's also a good idea to know your test results and keep a list of the medicines you take. How can you care for yourself at home? · Sleep or rest when your baby sleeps. · Get help with household chores from family or friends, if you can. Do not try to do it all yourself. · If you have hemorrhoids or swelling or pain around the opening of your vagina, try using cold and heat. You can put ice or a cold pack on the area for 10 to 20 minutes at a time. Put a thin cloth between the ice and your skin. Also try sitting in a few inches of warm water (sitz bath) 3 times a day and after bowel movements. · Take pain medicines exactly as directed. ¨ If the doctor gave you a prescription medicine for pain, take it as prescribed. ¨ If you are not taking a prescription pain medicine, ask your doctor if you can take an over-the-counter medicine. · Eat more fiber to avoid constipation. Include foods such as whole-grain breads and cereals, raw vegetables, raw and dried fruits, and beans. · Drink plenty of fluids, enough so that your urine is light yellow or clear like water. If you have kidney, heart, or liver disease and have to limit fluids, talk with your doctor before you increase the amount of fluids you drink. · Do not rinse inside your vagina with fluids (douche). · If you have stitches, keep the area clean by pouring or spraying warm water over the area outside your vagina and anus after you use the toilet. · Keep a list of questions to bring to your postpartum visit. Your questions might be about: 
¨ Changes in your breasts, such as lumps or soreness. ¨ When to expect your menstrual period to start again. ¨ What form of birth control is best for you. ¨ Weight you have put on during the pregnancy. ¨ Exercise options. ¨ What foods and drinks are best for you, especially if you are breastfeeding. ¨ Problems you might be having with breastfeeding. ¨ When you can have sex. Some women may want to talk about lubricants for the vagina. ¨ Any feelings of sadness or restlessness that you are having. When should you call for help? Call 911 anytime you think you may need emergency care. For example, call if: 
· You have thoughts of harming yourself, your baby, or another person. · You passed out (lost consciousness). Call your doctor now or seek immediate medical care if: 
· Your vaginal bleeding seems to be getting heavier. · You are dizzy or lightheaded, or you feel like you may faint. · You have a fever. Watch closely for changes in your health, and be sure to contact your doctor if: 
· You have new or worse vaginal discharge. · You feel sad or depressed. · You are having problems with your breasts or breastfeeding. Where can you learn more? Go to http://danna-rupesh.info/. Enter R108 in the search box to learn more about \"Postpartum: Care Instructions. \" Current as of: May 30, 2016 Content Version: 11.2 © 9008-8903 [employer]. Care instructions adapted under license by [employer] (which disclaims liability or warranty for this information). If you have questions about a medical condition or this instruction, always ask your healthcare professional. [place] [address] any warranty or liability for your use of this information.  Section: What to Expect at [facility] Your Recovery A  section, or , is surgery to deliver your baby through a cut, called an incision, that the doctor makes in your lower belly and uterus.  
You may have some pain in your lower belly and need pain medicine for 1 to 2 weeks. You can expect some vaginal bleeding for several weeks. You will probably need about 6 weeks to fully recover. It is important to take it easy while the incision is healing. Avoid heavy lifting, strenuous activities, or exercises that strain the belly muscles while you are recovering. Ask a family member or friend for help with housework, cooking, and shopping. This care sheet gives you a general idea about how long it will take for you to recover. But each person recovers at a different pace. Follow the steps below to get better as quickly as possible. How can you care for yourself at home? Activity · Rest when you feel tired. Getting enough sleep will help you recover. · Try to walk each day. Start by walking a little more than you did the day before. Bit by bit, increase the amount you walk. Walking boosts blood flow and helps prevent pneumonia, constipation, and blood clots. · Avoid strenuous activities, such as bicycle riding, jogging, weightlifting, and aerobic exercise, for 6 weeks or until your doctor says it is okay. · Until your doctor says it is okay, do not lift anything heavier than your baby. · Do not do sit-ups or other exercises that strain the belly muscles for 6 weeks or until your doctor says it is okay. · Hold a pillow over your incision when you cough or take deep breaths. This will support your belly and decrease your pain. · You may shower as usual. Pat the incision dry when you are done. · You will have some vaginal bleeding. Wear sanitary pads. Do not douche or use tampons until your doctor says it is okay. · Ask your doctor when you can drive again. · You will probably need to take at least 6 weeks off work. It depends on the type of work you do and how you feel. · Ask your doctor when it is okay for you to have sex. Diet · You can eat your normal diet. If your stomach is upset, try bland, low-fat foods like plain rice, broiled chicken, toast, and yogurt. · Drink plenty of fluids (unless your doctor tells you not to). · You may notice that your bowel movements are not regular right after your surgery. This is common. Try to avoid constipation and straining with bowel movements. You may want to take a fiber supplement every day. If you have not had a bowel movement after a couple of days, ask your doctor about taking a mild laxative. · If you are breastfeeding, do not drink any alcohol. Medicines · Your doctor will tell you if and when you can restart your medicines. He or she will also give you instructions about taking any new medicines. · If you take blood thinners, such as warfarin (Coumadin), clopidogrel (Plavix), or aspirin, be sure to talk to your doctor. He or she will tell you if and when to start taking those medicines again. Make sure that you understand exactly what your doctor wants you to do. · Take pain medicines exactly as directed. ¨ If the doctor gave you a prescription medicine for pain, take it as prescribed. ¨ If you are not taking a prescription pain medicine, ask your doctor if you can take an over-the-counter medicine. · If you think your pain medicine is making you sick to your stomach: 
¨ Take your medicine after meals (unless your doctor has told you not to). ¨ Ask your doctor for a different pain medicine. · If your doctor prescribed antibiotics, take them as directed. Do not stop taking them just because you feel better. You need to take the full course of antibiotics. Incision care · If you have strips of tape on the incision, leave the tape on for a week or until it falls off. · Wash the area daily with warm, soapy water, and pat it dry. Don't use hydrogen peroxide or alcohol, which can slow healing. You may cover the area with a gauze bandage if it weeps or rubs against clothing. Change the bandage every day. · Keep the area clean and dry. Other instructions · If you breastfeed your baby, you may be more comfortable while you are healing if you place the baby so that he or she is not resting on your belly. Try tucking your baby under your arm, with his or her body along the side you will be feeding on. Support your baby's upper body with your arm. With that hand you can control your baby's head to bring his or her mouth to your breast. This is sometimes called the football hold. Follow-up care is a key part of your treatment and safety. Be sure to make and go to all appointments, and call your doctor if you are having problems. It's also a good idea to know your test results and keep a list of the medicines you take. When should you call for help? Call 911 anytime you think you may need emergency care. For example, call if: 
· You passed out (lost consciousness). · You have symptoms of a blood clot in your lung (called a pulmonary embolism). These may include: 
¨ Sudden chest pain. ¨ Trouble breathing. ¨ Coughing up blood. · You have thoughts of harming yourself, your baby, or another person. Call your doctor now or seek immediate medical care if: 
· You have severe vaginal bleeding. This means that you are soaking through a pad every hour for 2 or more hours. · You are dizzy or lightheaded, or you feel like you may faint. · You have new or more belly pain. · You have loose stitches, or your incision comes open. · You have symptoms of infection, such as: 
¨ Increased pain, swelling, warmth, or redness. ¨ Red streaks leading from the incision. ¨ Pus draining from the incision. ¨ A fever. · You have symptoms of a blood clot in your leg (called a deep vein thrombosis), such as: 
¨ Pain in your calf, back of the knee, thigh, or groin. ¨ Redness and swelling in your leg or groin. Watch closely for changes in your health, and be sure to contact your doctor if: 
· You feel sad, anxious, or hopeless for more than a few days. · You do not get better as expected. Where can you learn more? Go to http://danna-rupesh.info/. Enter M806 in the search box to learn more about \" Section: What to Expect at Home. \" Current as of: May 30, 2016 Content Version: 11.2 © 0858-2310 [employer]. Care instructions adapted under license by [employer] (which disclaims liability or warranty for this information). If you have questions about a medical condition or this instruction, always ask your healthcare professional. [address] any warranty or liability for your use of this information. Breastfeeding: Care Instructions Your Care Instructions Breastfeeding has many benefits. It may lower your baby's chances of getting an infection. It also may prevent your baby from having problems such as diabetes and high cholesterol later in life. Breastfeeding also helps you bond with your baby. The American Academy of Pediatrics recommends breastfeeding for at least a year. That may be very hard for many women to do, but breastfeeding even for a shorter period of time is a health benefit to you and your baby. In the first days after birth, your breasts make a thick, yellow liquid called colostrum. This liquid gives your baby nutrients and antibodies against infection. It is all that babies need in the first days after birth. Your breasts will fill with milk a few days after the birth. Breastfeeding is a skill that gets better with practice. It is common to have some problems. Some women have sore or cracked nipples, blocked milk ducts, or a breast infection (mastitis). But if you feed your baby every 1 to 2 hours during the day and follow the tips on this sheet, you may not have these problems. You can treat these problems if they happen and continue breastfeeding. Follow-up care is a key part of your treatment and safety.  Be sure to make and go to all appointments, and call your doctor if you are having problems. It's also a good idea to know your test results and keep a list of the medicines you take. How can you care for yourself at home? · Breastfeed your baby whenever he or she is hungry. In the first 2 weeks, your baby will feed about every 1 to 3 hours. This will help you keep up your supply of milk. · Put a bed pillow or a nursing pillow on your lap to support your arms and your baby. · Hold your baby in a comfortable position. ¨ You can hold your baby in several ways. One of the most common positions is the cradle hold. One arm supports your baby, with his or her head in the bend of your elbow. Your open hand supports your baby's bottom or back. Your baby's belly lies against yours. ¨ If you had your baby by , or , try the football hold. This position keeps your baby off your belly. Tuck your baby under your arm, with his or her body along the side you will be feeding on. Support your baby's upper body with your arm. With that hand you can control your baby's head to bring his or her mouth to your breast. 
¨ Try different positions with each feeding. If you are having problems, ask for help from your doctor or a lactation consultant. · To get your baby to latch on: 
¨ Support and narrow your breast with one hand using a \"U hold,\" with your thumb on the outer side of your breast and your fingers on the inner side. You can also use a \"C hold,\" with all your fingers below the nipple and your thumb above it. Try the different holds to get the deepest latch for whichever breastfeeding position you use. Your other arm is behind your baby's back, with your hand supporting the base of the baby's head. Position your fingers and thumb to point toward your baby's ears. ¨ You can touch your baby's lower lip with your nipple to get your baby to open his or her mouth.  Wait until your baby opens up really wide, like a big yawn. Then be sure to bring the baby quickly to your breastnot your breast to the baby. As you bring your baby toward your breast, use your other hand to support the breast and guide it into his or her mouth. ¨ Both the nipple and a large portion of the darker area around the nipple (areola) should be in the baby's mouth. The baby's lips should be flared outward, not folded in (inverted). ¨ Listen for a regular sucking and swallowing pattern while the baby is feeding. If you cannot see or hear a swallowing pattern, watch the baby's ears, which will wiggle slightly when the baby swallows. If the baby's nose appears to be blocked by your breast, tilt the baby's head back slightly, so just the edge of one nostril is clear for breathing. ¨ When your baby is latched, you can usually remove your hand from supporting your breast and bring it under your baby to cradle him or her. Now just relax and breastfeed your baby. · You will know that your baby is feeding well when: 
¨ His or her mouth covers a lot of the areola, and the lips are flared out. ¨ His or her chin and nose rest against your breast. 
¨ Sucking is deep and rhythmic, with short pauses. ¨ You are able to see and hear your baby swallowing. ¨ You do not feel pain in your nipple. · If your baby takes only one breast at a feeding, start the next feeding on the other breast. 
· Anytime you need to remove your baby from the breast, put one finger in the corner of his or her mouth. Push your finger between your baby's gums to gently break the seal. If you do not break the tight seal before you remove your baby, your nipples can become sore, cracked, or bruised. · After feeding your baby, gently pat his or her back to let out any swallowed air. After your baby burps, offer the breast again, or offer the other breast. Sometimes a baby will want to keep feeding after being burped. When should you call for help? Call your doctor now or seek immediate medical care if: 
· You have symptoms of a breast infection, such as: 
¨ Increased pain, swelling, redness, or warmth around a breast. 
¨ Red streaks extending from the breast. 
¨ Pus draining from a breast. 
¨ A fever. · Your baby has no wet diapers for 6 hours. Watch closely for changes in your health, and be sure to contact your doctor if: 
· Your baby has trouble latching on to your breast. 
· You continue to have pain or discomfort when breastfeeding. · You have other questions or concerns. Where can you learn more? Go to http://danna-rupesh.info/. Enter P492 in the search box to learn more about \"Breastfeeding: Care Instructions. \" Current as of: May 30, 2016 Content Version: 11.2 © 3385-6020 [employer]. Care instructions adapted under license by Ak[employer]uki (which disclaims liability or warranty for this information). If you have questions about a medical condition or this instruction, always ask your healthcare professional. [place] [address] any warranty or liability for your use of this information. Nutrition for Breastfeeding Mothers: Care Instructions Your Care Instructions When a woman breastfeeds her baby, she needs more nutrients to keep herself healthy and to make the baby's milk. Breastfeeding helps build the bond between you and your baby. It gives your baby excellent health benefits. A healthy diet includes eating a variety of foods from the basic food groups: grains, vegetables, fruits, milk and milk products (such as cheese and yogurt), and meat and dried beans. Eating well during breastfeeding will ensure that you stay healthy and your baby grows and develops normally. Follow-up care is a key part of your treatment and safety.  Be sure to make and go to all appointments, and call your doctor if you are having problems. It's also a good idea to know your test results and keep a list of the medicines you take. How can you care for yourself at home? · Include 3 to 4 cups of nonfat or low-fat milk or milk products in your diet every day. These include: ¨ Milk (8 ounces equals 1 cup). ¨ Ice cream (1½ cups equals 1 cup of milk). ¨ Cheese (1½ ounces of cheese equals 1 cup). ¨ Yogurt (8 ounces equals 1 cup). · Eat at least 7 ounces of grains, such as cereals, breads, crackers, rice, or pasta, every day. One ounce is about 1 slice of bread, 1 cup of breakfast cereal, or ½ cup of cooked rice, cereal, or pasta. · Eat 3 cups of vegetables each day. Choices include: ¨ Dark-green vegetables such as broccoli and spinach. ¨ Orange vegetables such as carrots and sweet potatoes. ¨ Dried beans (such as roldan and kidney beans) and peas (such as lentils). · Every day, eat 2 cups of fresh, frozen, or canned fruit. · Eat 6½ ounces each day of protein, such as chicken, fish, lean meat, eggs, peanut butter, dried beans and peas, nuts, and seeds. One egg, 1 tablespoon of peanut butter, or ½ ounce of nuts or seeds equals 1 ounce of protein. A ½ cup of cooked beans equals 2 ounces of protein. · Drink plenty of fluids, enough so that your urine is light yellow or clear like water. If you have kidney, heart, or liver disease and have to limit fluids, talk with your doctor before you increase the amount of fluids you drink. · Limit caffeine products, such as coffee, tea, chocolate, and some sodas. Caffeine can pass to your baby through breast milk. It may cause fussiness and sleep problems in babies. · Your doctor may recommend a vitamin supplement. Take it as recommended. · Consider joining a breastfeeding support group. These are offered at many hospitals and birthing centers by nurses, nurse-midwives, or lactation consultants. When should you call for help?  
Watch closely for changes in your health, and be sure to contact your doctor if: 
· You feel that you are not making enough milk for your baby. · You are losing a lot of weight. · You do not think your baby is gaining enough weight. · You would like help to plan a healthy diet. Where can you learn more? Go to http://danna-rupesh.info/. Enter P234 in the search box to learn more about \"Nutrition for Breastfeeding Mothers: Care Instructions. \" Current as of: May 30, 2016 Content Version: 11.2 © 6709-6007 [employer]. Care instructions adapted under license by Mind[employer]o (which disclaims liability or warranty for this information). If you have questions about a medical condition or this instruction, always ask your healthcare professional. [address] any warranty or liability for your use of this information. How to Breastfeed: Step by Step Your Care Instructions Breastfeeding is a skill that gets better with practice. Breastfeed your baby whenever he or she is hungry. In the first 2 weeks, your baby will feed about every 1 to 3 hours. Here is a step-by-step guide on how to breastfeed. It shows just one position that you can use for breastfeeding. Talk to your doctor or nurse if you are having trouble getting your baby to latch on. How to Breastfeed Get ready to breastfeed 1. Sit in a comfortable chair. Support your baby on a pillow on your lap. Support your breast 
 
1. Support and narrow your breast with one hand using a \"U hold. \" Your thumb will be on the outer side of your breast. Your fingers will be on the inner side. 2. You can also use a \"C hold,\" with all your fingers below the nipple and your thumb above it. Position your baby 1. Your other arm is behind your baby's back, with your hand supporting the base of the baby's head. 2. Point your fingers and thumb toward your baby's ears. Get baby to open mouth 1. Touch your baby's lower lip with your nipple to get your baby to open his or her mouth. Wait until your baby opens up really wide, like a big yawn. 2. Bring the baby quickly to your breastnot your breast to the baby. 3. Guide your breast into his or her mouth. Listen for sucking sounds 1. The nipple and a large part of the darker area around the nipple (areola) should be in the baby's mouth. The baby's lips should be flared out, not folded in. 
2. Listen for regular sucking and swallowing sounds while the baby is feeding. If you cannot see or hear swallowing, watch your baby's ears. They will wiggle slightly when the baby swallows. Break the seal to stop feeding 1. To remove your baby from your breast, put one finger in the corner of his or her mouth. 2. Push your finger between your baby's gums to gently break the seal. If you do not break the tight seal before you remove your baby, your nipples can become sore, cracked, or bruised. Where can you learn more? Go to http://danna-rupesh.info/. Enter M177 in the search box to learn more about \"How to Breastfeed: Step by Step. \" Current as of: May 30, 2016 Content Version: 11.2 © 1424-8238 [employer]. Care instructions adapted under license by [employer] (which disclaims liability or warranty for this information). If you have questions about a medical condition or this instruction, always ask your healthcare professional. [place] [address] any warranty or liability for your use of this information. Feeding Your Baby in the First Year: Care Instructions Your Care Instructions Feeding a baby is an important concern for parents. Most experts recommend breastfeeding for at least the first year. If you are unable to or choose not to breastfeed, feed your baby iron-fortified infant formula.  Most babies younger than 10months of age can get all the nutrition and fluid they need from breast milk or infant formula. Starting around 10months of age, your baby needs solid foods along with breast milk or formula. Some babies may be ready for solid foods at 4 or 5 months. Ask your doctor when you can start feeding your baby solid foods. And if a family member has food allergies, ask whether and how to start foods that might cause allergies. Most allergic reactions in children are caused by eggs, milk, wheat, soy, and peanuts. Weaning is the process of switching your baby from breastfeeding to bottle-feeding, or from a breast or bottle to a cup or solid foods. Weaning usually works best when it is done gradually over several weeks, months, or even longer. There is no right or wrong time to wean. It depends on how ready you and your baby are to start. Follow-up care is a key part of your child's treatment and safety. Be sure to make and go to all appointments, and call your doctor if your child is having problems. It's also a good idea to know your child's test results and keep a list of the medicines your child takes. How can you care for your child at home? Babies ages 2 month to 10 months · Feed your baby breast milk or formula whenever your infant shows signs of hunger. By 2 months, most babies have a set feeding routine. But your baby's routine may change at times, such as during growth spurts when your baby may be hungry more often. At around 1months of age, your baby may breastfeed less often. That's because your baby is able to drink more milk at one time. Your milk supply will naturally increase as your baby needs more milk. · Do not give any milk other than breast milk or infant formula until your baby is 1 year of age. Cow's milk, goat's milk, and soy milk do not have the nutrients that very young babies need to grow and develop properly.  Cow and goat milk are very hard for young babies to digest. 
 · Ask your doctor how long to keep giving your baby a vitamin D supplement. Babies ages 7 months to 13 months · Around 6 months, you can begin to add other foods besides breast milk or infant formula to your baby's diet. · Start with very soft foods, such as baby cereal. Iron-fortified, single-grain baby cereals are a good choice. · Introduce one new food at a time. This can help you know if your baby has an allergy to a certain food. You can introduce a new food every 2 to 3 days. · When giving solid foods, look for signs that your baby is still hungry or is full. Don't persist if your baby isn't interested in or doesn't like the food. · Keep offering breast milk or infant formula as part of your baby's diet until he or she is at least 3year old. · If you feel that you and your baby are ready, these tips may help you wean your baby from the breast to a cup or bottle. ¨ Try letting your baby drink from a cup. If your baby is not ready, you can start by switching to a bottle. ¨ Slowly reduce the number of times you breastfeed each day. ¨ Each week, choose one more breastfeeding time to replace or shorten. ¨ Offer the cup or bottle before you breastfeed or between breastfeedings. You can use breast milk pumped from your breast. Or you can use formula. When should you call for help? Watch closely for changes in your child's health, and be sure to contact your doctor if: 
· You have questions about feeding your baby. · You are concerned that your baby is not eating enough. · You have trouble feeding your baby. Where can you learn more? Go to http://danna-rupesh.info/. Enter A007 in the search box to learn more about \"Feeding Your Baby in the First Year: Care Instructions. \" Current as of: August 8, 2016 Content Version: 11.2 © 4791-6895 [employer].  Care instructions adapted under license by RunTitle (which disclaims liability or warranty for this information). If you have questions about a medical condition or this instruction, always ask your healthcare professional. [address] any warranty or liability for your use of this information. Feeding Your : Care Instructions Your Care Instructions Feeding a  is an important concern for parents. Experts recommend that newborns be fed on demand. This means that you breastfeed or bottle-feed your infant whenever he or she shows signs of hunger, rather than setting a strict schedule. Newborns follow their feelings of hunger. They eat when they are hungry and stop eating when they are full. Most experts also recommend breastfeeding for at least the first year. A common concern for parents is whether their baby is eating enough. Talk to your doctor if you are concerned about how much your baby is eating. Most newborns lose weight in the first several days after birth but regain it within a week or two. After 3weeks of age, your baby should continue to gain weight steadily. Follow-up care is a key part of your child's treatment and safety. Be sure to make and go to all appointments, and call your doctor if your child is having problems. It's also a good idea to know your child's test results and keep a list of the medicines your child takes. How can you care for your child at home? · Allow your baby to feed on demand. ¨ During the first 2 weeks, these feedings occur every 1 to 3 hours (about 8 to 12 feedings in a 24-hour period) for  babies. These early feedings may last only a few minutes. Over time, feeding sessions will become longer and may happen less often. ¨ Formula-fed babies may have slightly fewer feedings, about 6 to 10 every 24 hours. They will eat about 2 to 3 ounces every 3 to 4 hours during the first few weeks of life. ¨ By 2 months, most babies have a set feeding routine.  But your baby's routine may change at times, such as during growth spurts when your baby may be hungry more often. · You may have to wake a sleepy baby to feed in the first few days after birth. · Do not give any milk other than breast milk or infant formula until your baby is 1 year of age. Cow's milk, goat's milk, and soy milk do not have the nutrients that very young babies need to grow and develop properly. Cow and goat milk are very hard for young babies to digest. 
· Ask your doctor about giving a vitamin D supplement starting within the first few days after birth. · If you choose to switch your baby from the breast to bottle-feeding, try these tips. ¨ Try letting your baby drink from a bottle. Slowly reduce the number of times you breastfeed each day. For a week, replace a breastfeeding with a bottle-feeding during one of your daily feeding times. ¨ Each week, choose one more breastfeeding time to replace or shorten. ¨ Offer the bottle before each breastfeeding. When should you call for help? Watch closely for changes in your child's health, and be sure to contact your doctor if: 
· You have questions about feeding your baby. · You are concerned that your baby is not eating enough. · You have trouble feeding your baby. Where can you learn more? Go to http://danna-rupesh.info/. Enter [id number] in the search box to learn more about \"Feeding Your : Care Instructions. \" Current as of: 2016 Content Version: 11.2 © 4536-3979 [employer]. Care instructions adapted under license by [employer] (which disclaims liability or warranty for this information). If you have questions about a medical condition or this instruction, always ask your healthcare professional. [place] [address] any warranty or liability for your use of this information.

## 2017-06-01 NOTE — PROGRESS NOTES
Assumed care of patient from L&D. Pt resting in bed, family at bedside. Vital signs are stable. Pt reports pain 3/10, encouraged to use PCA, pt feeling slightly nauseous, Zofran given. Pt awaiting clear liquid tray on the way.

## 2017-06-01 NOTE — IP AVS SNAPSHOT
Summary of Care Report The Summary of Care report has been created to help improve care coordination. Users with access to [employer] or [address] (Web-based application) may access additional patient information including the Discharge Summary. If you are not currently a [address] user and need more information, please call the number listed below in the [place] [address] section and ask to be connected with Medical Records. Facility Information Name Address Phone [facility] MATHEUS [address] [facility] [address] [phone] Patient Information Patient Name Sex  Ti Caballero ([id number]) Female 1985 Discharge Information Admitting Provider Service Area Unit Gunner Austin MD / [address] [place] [address] / [phone] Discharge Provider Discharge Date/Time Discharge Disposition Destination (none) (none) (none) (none) Patient Language Language ENGLISH [13] Hospital Problems as of 6/3/2017  Reviewed: 2017  1:29 PM by Stevie Art MD  
  
  
  
 Class Noted - Resolved Last Modified POA Active Problems 39 weeks gestation of pregnancy  10/9/2015 - Present 2017 by Gunner Austin MD Yes Entered by Raven Moe Temporary low platelet count ([address]  10/9/2015 - Present 2017 by Gunner Austin MD Yes Entered by Raven Moe  
  IUP (intrauterine pregnancy), incidental  10/11/2015 - Present 2017 by Gunner Austin MD Unknown Entered by Raven Moe Thrombocytopenia ([address]  2017 - Present 2017 by Gunner Austin MD Yes Entered by Gunner Austin MD  
  * (Principal)Previous  section complicating pregnancy  3/9/0728 - Present 2017 by Gunner Austin MD Yes   Entered by Gunner Austin MD  
  
Non-Hospital Problems as of 6/3/2017  Reviewed: 2017  1:29 PM by Lloyd Romero MD  
  
  
  
 Class Noted - Resolved Last Modified Active Problems High-risk pregnancy supervision  10/9/2015 - Present 10/9/2015 by Chris Rice Entered by Chris Rice You are allergic to the following Allergen Reactions Morphine Hives Current Discharge Medication List  
  
ASK your doctor about these medications Dose & Instructions Dispensing Information Comments  
 cyanocobalamin 100 mcg tablet Commonly known as:  VITAMIN B12 Dose:  100 mcg Take 100 mcg by mouth daily. Indications: PREVENTION OF VITAMIN B12 DEFICIENCY Refills:  0  
   
 ferrous sulfate 325 mg (65 mg iron) tablet Dose:  325 mg Take 325 mg by mouth two (2) times a day. Indications: IRON DEFICIENCY ANEMIA Refills:  0  
   
 OTHER Take  by mouth. Refills:  0  
   
 PNV with Ca No.65-Iron Poly-FA 60 mg iron-1 mg Cap Dose:  1 Tab Take 1 Tab by mouth daily. Indications: PREGNANCY Refills:  0 VALTREX 1 gram tablet Generic drug:  valACYclovir Take  by mouth daily. Refills:  0  
   
 VITAMIN C 500 mg tablet Generic drug:  ascorbic acid (vitamin C) Dose:  500 mg Take 500 mg by mouth daily. Refills:  0  
   
 VITAMIN D3 2,000 unit Tab Generic drug:  cholecalciferol (vitamin D3) Dose:  1 Tab Take 1 Tab by mouth. Indications: PREVENTION OF VITAMIN D DEFICIENCY Refills:  0 Surgery Information ID Date/Time Status Primary Surgeon All Procedures Location [id number] 6/1/2017 [address] [facility] L&D OR Follow-up Information None Discharge Instructions [employer] [facility] Activation Thank you for requesting access to [facility]. Please follow the instructions below to securely access and download your online medical record. [employer] [facility] allows you to send messages to your doctor, view your test results, renew your prescriptions, schedule appointments, and more. How Do I Sign Up? 1. In your internet browser, go to [email or web address] 
2. Click on the First Time User? Click Here link in the Sign In box. You will be redirect to the New Member Sign Up page. 3. Enter your [employer] Access Code exactly as it appears below. You will not need to use this code after youve completed the sign-up process. If you do not sign up before the expiration date, you must request a new code. [employer] Access Code: [email or web address] Expires: 2017  3:20 PM (This is the date your [employer] access code will ) 4. Enter the last four digits of your Social Security Number (xxxx) and Date of Birth (mm/dd/yyyy) as indicated and click Submit. You will be taken to the next sign-up page. 5. Create a [employer] ID. This will be your [employer] login ID and cannot be changed, so think of one that is secure and easy to remember. 6. Create a [employer] password. You can change your password at any time. 7. Enter your Password Reset Question and Answer. This can be used at a later time if you forget your password. 8. Enter your e-mail address. You will receive e-mail notification when new information is available in [address] 9. Click Sign Up. You can now view and download portions of your medical record. 10. Click the Download Summary menu link to download a portable copy of your medical information. Additional Information If you have questions, please visit the Frequently Asked Questions section of the [employer] website at [email or web address] Remember, [employer] is NOT to be used for urgent needs. For medical emergencies, dial 911. Patient armband removed and shredded Chart Review Routing History No Routing History on File

## 2017-06-01 NOTE — LACTATION NOTE
Infant latched and nursing well with breast sandwiching for 25 minutes. Breastfeeding basics and log sheet discussed.

## 2017-06-02 LAB
HCT VFR BLD AUTO: 30.6 % (ref 35–45)
HGB BLD-MCNC: 10.2 G/DL (ref 12–16)

## 2017-06-02 PROCEDURE — 74011250637 HC RX REV CODE- 250/637: Performed by: ADVANCED PRACTICE MIDWIFE

## 2017-06-02 PROCEDURE — 85014 HEMATOCRIT: CPT | Performed by: OBSTETRICS & GYNECOLOGY

## 2017-06-02 PROCEDURE — 74011250637 HC RX REV CODE- 250/637: Performed by: OBSTETRICS & GYNECOLOGY

## 2017-06-02 PROCEDURE — 74011250636 HC RX REV CODE- 250/636: Performed by: OBSTETRICS & GYNECOLOGY

## 2017-06-02 PROCEDURE — 36415 COLL VENOUS BLD VENIPUNCTURE: CPT | Performed by: OBSTETRICS & GYNECOLOGY

## 2017-06-02 PROCEDURE — 85018 HEMOGLOBIN: CPT | Performed by: OBSTETRICS & GYNECOLOGY

## 2017-06-02 PROCEDURE — 65270000029 HC RM PRIVATE

## 2017-06-02 RX ORDER — AMOXICILLIN 250 MG
1 CAPSULE ORAL
Status: DISCONTINUED | OUTPATIENT
Start: 2017-06-02 | End: 2017-06-03 | Stop reason: HOSPADM

## 2017-06-02 RX ADMIN — DOCUSATE SODIUM AND SENNOSIDES 1 TABLET: 8.6; 5 TABLET, FILM COATED ORAL at 09:37

## 2017-06-02 RX ADMIN — KETOROLAC TROMETHAMINE 30 MG: 30 INJECTION, SOLUTION INTRAMUSCULAR at 09:33

## 2017-06-02 RX ADMIN — KETOROLAC TROMETHAMINE 30 MG: 30 INJECTION, SOLUTION INTRAMUSCULAR at 16:01

## 2017-06-02 RX ADMIN — KETOROLAC TROMETHAMINE 30 MG: 30 INJECTION, SOLUTION INTRAMUSCULAR at 22:11

## 2017-06-02 RX ADMIN — KETOROLAC TROMETHAMINE 30 MG: 30 INJECTION, SOLUTION INTRAMUSCULAR at 03:25

## 2017-06-02 RX ADMIN — OXYCODONE AND ACETAMINOPHEN 1 TABLET: 5; 325 TABLET ORAL at 20:36

## 2017-06-02 NOTE — PROGRESS NOTES
completed the initial Spiritual Assessment of the patient, and offered Pastoral Care, see flow sheets for interventions. Baby [place] Card and Spiritual Care literature were provided. Patient does not have any Evangelical/cultural needs that will affect patients preferences in health care. Chaplains will continue to follow and will provide pastoral care as needed or requested.     Sister Eri Mantilla, [place], [address] [place] [address]   [phone] - Office

## 2017-06-02 NOTE — PROGRESS NOTES
Bedside shift change report given to Skyler Gloria RN (oncoming nurse) by Driss Devine RN (offgoing nurse). Report included the following information SBAR, Kardex, MAR and Recent Results.

## 2017-06-02 NOTE — ROUTINE PROCESS
Bedside and Verbal shift change report given to ABBY Guadalupe RN (oncoming nurse) by DIMPLE Barger RN (offgoing nurse). Report included the following information SBAR, Kardex, Intake/Output, MAR and Recent Results.

## 2017-06-02 NOTE — LACTATION NOTE
Mom did not want to use nipple shield--reviewed basics. Infant not keeping deep latch-mom put it on. Mom encouraged to always try without it--if not keeping deep latch after 2 tries use shield.

## 2017-06-03 VITALS
WEIGHT: 215 LBS | HEIGHT: 68 IN | TEMPERATURE: 98.5 F | RESPIRATION RATE: 18 BRPM | BODY MASS INDEX: 32.58 KG/M2 | DIASTOLIC BLOOD PRESSURE: 76 MMHG | OXYGEN SATURATION: 98 % | SYSTOLIC BLOOD PRESSURE: 128 MMHG | HEART RATE: 72 BPM

## 2017-06-03 PROCEDURE — 74011250637 HC RX REV CODE- 250/637: Performed by: ADVANCED PRACTICE MIDWIFE

## 2017-06-03 PROCEDURE — 74011250637 HC RX REV CODE- 250/637: Performed by: OBSTETRICS & GYNECOLOGY

## 2017-06-03 PROCEDURE — 74011250636 HC RX REV CODE- 250/636: Performed by: OBSTETRICS & GYNECOLOGY

## 2017-06-03 RX ORDER — IBUPROFEN 800 MG/1
800 TABLET ORAL
Qty: 60 TAB | Refills: 1 | Status: SHIPPED | OUTPATIENT
Start: 2017-06-04 | End: 2017-07-18

## 2017-06-03 RX ORDER — OXYCODONE AND ACETAMINOPHEN 5; 325 MG/1; MG/1
1-2 TABLET ORAL
Qty: 30 TAB | Refills: 0 | Status: SHIPPED | OUTPATIENT
Start: 2017-06-03 | End: 2017-07-18

## 2017-06-03 RX ADMIN — SIMETHICONE CHEW TAB 80 MG 80 MG: 80 TABLET ORAL at 09:19

## 2017-06-03 RX ADMIN — KETOROLAC TROMETHAMINE 30 MG: 30 INJECTION, SOLUTION INTRAMUSCULAR at 04:46

## 2017-06-03 RX ADMIN — DOCUSATE SODIUM AND SENNOSIDES 1 TABLET: 8.6; 5 TABLET, FILM COATED ORAL at 09:19

## 2017-06-03 RX ADMIN — KETOROLAC TROMETHAMINE 30 MG: 30 INJECTION, SOLUTION INTRAMUSCULAR at 08:58

## 2017-06-03 RX ADMIN — OXYCODONE AND ACETAMINOPHEN 1 TABLET: 5; 325 TABLET ORAL at 01:10

## 2017-06-03 RX ADMIN — OXYCODONE AND ACETAMINOPHEN 2 TABLET: 5; 325 TABLET ORAL at 09:54

## 2017-06-03 NOTE — LACTATION NOTE
Per mom, infant continues to BF well,  Nipple shield intermittent use. Mom denies any c/o or questions.

## 2017-06-03 NOTE — DISCHARGE INSTRUCTIONS
[facility] Activation    Thank you for requesting access to [facility]. Please follow the instructions below to securely access and download your online medical record. [facility] allows you to send messages to your doctor, view your test results, renew your prescriptions, schedule appointments, and more. How Do I Sign Up? 1. In your internet browser, go to [email or web address]  2. Click on the First Time User? Click Here link in the Sign In box. You will be redirect to the New Member Sign Up page. 3. Enter your [facility] Access Code exactly as it appears below. You will not need to use this code after youve completed the sign-up process. If you do not sign up before the expiration date, you must request a new code. [facility] Access Code: [email or web address]  Expires: 2017  3:20 PM (This is the date your [facility] access code will )    4. Enter the last four digits of your Social Security Number (xxxx) and Date of Birth (mm/dd/yyyy) as indicated and click Submit. You will be taken to the next sign-up page. 5. Create a [facility] ID. This will be your [facility] login ID and cannot be changed, so think of one that is secure and easy to remember. 6. Create a [facility] password. You can change your password at any time. 7. Enter your Password Reset Question and Answer. This can be used at a later time if you forget your password. 8. Enter your e-mail address. You will receive e-mail notification when new information is available in [address] 9. Click Sign Up. You can now view and download portions of your medical record. 10. Click the Download Summary menu link to download a portable copy of your medical information. Additional Information    If you have questions, please visit the Frequently Asked Questions section of the [facility] website at [email or web address] Remember, [facility] is NOT to be used for urgent needs. For medical emergencies, dial 911.       Patient armband removed and shredded

## 2017-06-03 NOTE — PROGRESS NOTES
Bedside and Verbal shift change report given to KOSTA Goodman Captain (oncoming nurse) by ABBY Trinh RN (offgoing nurse). Report given with Markos PATTERSON and MAR.

## 2017-06-03 NOTE — PROGRESS NOTES
Patient discharged in care of family in stable condition via wheelchair. To F/U with OB in six weeks. No discharge needs anticipated.

## 2017-06-03 NOTE — ROUTINE PROCESS
Bedside and Verbal shift change report given to Winsome CARO (oncoming nurse) by KOSTA Fields (offgoing nurse). Report included the following information SBAR, Intake/Output, MAR and Recent Results.

## 2017-06-03 NOTE — PROGRESS NOTES
Post-Operative  Day 2    Kj Chapman   [id number]      Patient doing well without significant complaint. Tolerating diet, passing flatus, voiding and ambulating without difficulty    Vitals:  Visit Vitals    /75    Pulse 70    Temp 98.9 °F (37.2 °C)    Resp 18    Ht 5' 7.5\" (1.715 m)    Wt 97.5 kg (215 lb)    SpO2 96%    Breastfeeding Unknown    BMI 33.18 kg/m2     Temp (24hrs), Av.5 °F (36.9 °C), Min:98.1 °F (36.7 °C), Max:98.9 °F (37.2 °C)        Exam:    Patient without distress. Abdomen: bowel sounds present, soft, expected tenderness, fundus firm 0. Wound incision clean, dry and intact.       Labs:   Lab Results   Component Value Date/Time    WBC 12.6 2017 12:09 PM    WBC 11.4 2017 08:34 AM    WBC 15.9 10/14/2015 08:45 AM    WBC 16.9 10/13/2015 02:18 AM    WBC 16.0 10/12/2015 06:40 PM    WBC 13.7 10/12/2015 06:08 AM    WBC 14.1 10/11/2015 06:00 PM    WBC 16.9 10/11/2015 06:20 AM    WBC 20.1 10/10/2015 05:15 PM    WBC 12.2 10/10/2015 05:00 AM    WBC 9.8 10/09/2015 06:30 PM    WBC 10.6 10/06/2015 07:05 PM    HGB 10.2 2017 04:50 AM    HGB 11.7 2017 12:09 PM    HGB 12.1 2017 08:34 AM    HGB 9.4 10/14/2015 08:45 AM    HGB 9.0 10/13/2015 02:18 AM    HGB 9.8 10/12/2015 06:40 PM    HGB 9.9 10/12/2015 06:08 AM    HGB 10.9 10/11/2015 06:00 PM    HGB 10.3 10/11/2015 06:20 AM    HGB 12.1 10/10/2015 05:15 PM    HGB 11.8 10/10/2015 05:00 AM    HGB 11.8 10/09/2015 06:30 PM    HGB 12.4 10/06/2015 07:05 PM    HCT 30.6 2017 04:50 AM    HCT 34.3 2017 12:09 PM    HCT 35.3 2017 08:34 AM    HCT 27.5 10/14/2015 08:45 AM    HCT 26.7 10/13/2015 02:18 AM    HCT 28.9 10/12/2015 06:40 PM    HCT 29.2 10/12/2015 06:08 AM    HCT 31.9 10/11/2015 06:00 PM    HCT 30.2 10/11/2015 06:20 AM    HCT 35.3 10/10/2015 05:15 PM    HCT 34.9 10/10/2015 05:00 AM    HCT 34.2 10/09/2015 06:30 PM    HCT 36.4 10/06/2015 07:05 PM    PLATELET 71  12:09 PM    PLATELET 67 06/01/2017 08:34 AM    PLATELET 246 57/24/3014 08:45 AM    PLATELET 129 28/87/1950 02:18 AM    PLATELET 817 81/95/7245 06:40 PM    PLATELET 010 89/84/2569 06:08 AM    PLATELET 546 77/77/3114 06:00 PM    PLATELET 99 95/09/5519 06:20 AM    PLATELET 210 16/19/5264 05:15 PM    PLATELET 79 42/30/8681 05:00 AM    PLATELET 80 62/73/8627 06:30 PM    PLATELET 92 68/71/5149 07:05 PM       No results found for this or any previous visit (from the past 24 hour(s)). Assessment: Post-Op day 3, doing well    Plan:   1. Discharge home today  2. Follow-up in office in 2 and 6 weeks with Valeria Lawler MD  3. Post partum activity/wound care advised, diet as tolerated  4. Discharge Medications: ibuprofen, percocet and medications prior to admission    Discharge Instructions:  Current Discharge Medication List      CONTINUE these medications which have NOT CHANGED    Details          OTHER Take  by mouth. PNV with Ca No.65-Iron Poly-FA 60 mg iron-1 mg cap Take 1 Tab by mouth daily. Indications: PREGNANCY      ferrous sulfate 325 mg (65 mg iron) tablet Take 325 mg by mouth two (2) times a day. Indications: IRON DEFICIENCY ANEMIA      cyanocobalamin (VITAMIN B12) 100 mcg tablet Take 100 mcg by mouth daily. Indications: PREVENTION OF VITAMIN B12 DEFICIENCY      cholecalciferol, vitamin D3, (VITAMIN D3) 2,000 unit tab Take 1 Tab by mouth. Indications: PREVENTION OF VITAMIN D DEFICIENCY      ascorbic acid (VITAMIN C) 500 mg tablet Take 500 mg by mouth daily.              Cristobal Mendiola CNM   6/3/2017  11:54 AM

## 2017-06-08 NOTE — DISCHARGE SUMMARY
Discharge Summary    Admit date: 2017  Discharge date:  6/3/2017    Postoperative Diagnosis: Previous        Procedure: Low Cervical Transverse Procedure(s):  REPEAT  SECTION    Name: Bebeto Nam   Medical Record Number: [id number]   YOB: 1985     Significant admission findings ( see H&P): thrombocytopenia, term preg, previous C Sect. Hospital course: Patient was admitted in am, steroids given to elevate platelets which was only partly successful, elective  section was performed later on the day of admission. GA used. Baby weight and Apgars are shown below. Procedure was uneventful, with no obvious complications. Postop course was uneventful, with no significant fever, and vitals normal, except as mentioned below. She mobilized well, did breast-feed. She was discharged home on the third day post partum. She was asked to see us in the office in 1 week. She had good support at home, and will call if she has any problems before she sees us in the office. Incision looked good prior to discharge with no sign of hematoma, swelling, or infection. Findings:     Anesthesia: General    Birth Information:   Information for the patient's : Michael Lopez [[id number]]   One Minute Apgar: 7 (Filed from Delivery Summary)  Five  Minute Apgar: 8 (Filed from Delivery Summary)      Pelvic findings at surgery: normal, except for minimal pelvic adhesions.     Estimated Blood Loss:  500    Relevant Lab:   ABO/Rh(D)   Date Value Ref Range Status   2017 O POSITIVE  Final     ABO,Rh   Date Value Ref Range Status   2015 O pos  Final     WBC   Date Value Ref Range Status   2017 12.6 4.6 - 13.2 K/uL Final   2017 11.4 4.6 - 13.2 K/uL Final     HGB   Date Value Ref Range Status   2017 10.2 (L) 12.0 - 16.0 g/dL Final   2017 11.7 (L) 12.0 - 16.0 g/dL Final     HCT   Date Value Ref Range Status 06/02/2017 30.6 (L) 35.0 - 45.0 % Final   06/01/2017 34.3 (L) 35.0 - 45.0 % Final     PLATELET   Date Value Ref Range Status   06/01/2017 71 (L) 135 - 420 K/uL Final   06/01/2017 67 (L) 135 - 420 K/uL Final       Lab Results   Component Value Date/Time    Rubella, External immune 10/20/2016    GrBStrep, External negative 05/05/2017     Rhogam is not given. Discharge Medication List as of 6/3/2017 10:36 AM      CONTINUE these medications which have NOT CHANGED    Details   valACYclovir (VALTREX) 1 gram tablet Take  by mouth daily. , Historical Med      OTHER Take  by mouth., Historical Med      PNV with Ca No.65-Iron Poly-FA 60 mg iron-1 mg cap Take 1 Tab by mouth daily. Indications: PREGNANCY, Historical Med      ferrous sulfate 325 mg (65 mg iron) tablet Take 325 mg by mouth two (2) times a day. Indications: IRON DEFICIENCY ANEMIA, Historical Med      cyanocobalamin (VITAMIN B12) 100 mcg tablet Take 100 mcg by mouth daily. Indications: PREVENTION OF VITAMIN B12 DEFICIENCY, Historical Med      cholecalciferol, vitamin D3, (VITAMIN D3) 2,000 unit tab Take 1 Tab by mouth. Indications: PREVENTION OF VITAMIN D DEFICIENCY, Historical Med      ascorbic acid (VITAMIN C) 500 mg tablet Take 500 mg by mouth daily. , Historical Med              Signed: Ashley Miramontes MD      June 8, 2017

## 2017-07-18 ENCOUNTER — OFFICE VISIT (OUTPATIENT)
Dept: FAMILY MEDICINE CLINIC | Age: 32
End: 2017-07-18

## 2017-07-18 VITALS
SYSTOLIC BLOOD PRESSURE: 117 MMHG | HEIGHT: 67 IN | WEIGHT: 187.4 LBS | HEART RATE: 65 BPM | RESPIRATION RATE: 16 BRPM | DIASTOLIC BLOOD PRESSURE: 82 MMHG | OXYGEN SATURATION: 99 % | BODY MASS INDEX: 29.41 KG/M2 | TEMPERATURE: 99 F

## 2017-07-18 DIAGNOSIS — K64.4 EXTERNAL HEMORRHOID: Primary | ICD-10-CM

## 2017-07-18 DIAGNOSIS — Z00.00 PHYSICAL EXAM, ANNUAL: ICD-10-CM

## 2017-07-18 DIAGNOSIS — D69.6 THROMBOCYTOPENIA (HCC): ICD-10-CM

## 2017-07-18 LAB
BILIRUB UR QL STRIP: NEGATIVE
GLUCOSE UR-MCNC: NEGATIVE MG/DL
KETONES P FAST UR STRIP-MCNC: NEGATIVE MG/DL
PH UR STRIP: 6 [PH] (ref 4.6–8)
PROT UR QL STRIP: NEGATIVE MG/DL
SP GR UR STRIP: 1.02 (ref 1–1.03)
UA UROBILINOGEN AMB POC: NORMAL (ref 0.2–1)
URINALYSIS CLARITY POC: CLEAR
URINALYSIS COLOR POC: YELLOW
URINE BLOOD POC: NORMAL
URINE LEUKOCYTES POC: NEGATIVE
URINE NITRITES POC: NEGATIVE

## 2017-07-18 RX ORDER — DOCUSATE SODIUM 100 MG/1
100 CAPSULE, LIQUID FILLED ORAL 2 TIMES DAILY
COMMUNITY
End: 2018-02-16

## 2017-07-18 RX ORDER — HYDROCORTISONE 25 MG/G
CREAM TOPICAL 4 TIMES DAILY
Qty: 30 G | Refills: 0 | Status: SHIPPED | OUTPATIENT
Start: 2017-07-18 | End: 2018-02-16

## 2017-07-18 RX ORDER — VALACYCLOVIR HYDROCHLORIDE 1 G/1
TABLET, FILM COATED ORAL
Refills: 0 | COMMUNITY
Start: 2017-05-19 | End: 2017-07-18

## 2017-07-18 RX ORDER — CYCLOBENZAPRINE HCL 10 MG
TABLET ORAL
Refills: 0 | COMMUNITY
Start: 2017-05-10 | End: 2017-07-18

## 2017-07-18 RX ORDER — LANOLIN ALCOHOL/MO/W.PET/CERES
1000 CREAM (GRAM) TOPICAL DAILY
COMMUNITY
End: 2018-02-16

## 2017-07-18 RX ORDER — NORETHINDRONE 0.35 MG
KIT ORAL
Refills: 8 | COMMUNITY
Start: 2017-07-11 | End: 2018-02-16

## 2017-07-18 NOTE — PATIENT INSTRUCTIONS

## 2017-07-18 NOTE — PROGRESS NOTES
HISTORY OF PRESENT ILLNESS  Gregoria Mckeon is a 36 y.o. female. HPI  The patient presents today to establish care. Previous PCP was n/a. Last office visit many years ago. Has been using urgent care for her medical needs. Just moved to the area. Had a baby ; breastfeeding. Works as a nurse at [employer]. Medical Problems:  1. Thrombocytopenia - Hx of this, got worse during pregnancy. They want her to see a hematologist at [facility], but this has not been set up yet. Being arranged through gynecology. Thinks her platelets were 87 last week. No history of this prior to her first pregnancy, which was in .   2. Genital herpes (?) - Has been told that her bloodwork was positive for herpes, but has never had an outbreak. Current Specialists:  1. [facility] in  - OB/GYN; last visit about 2 weeks ago    Acute complaints today:  1. Had a baby . Over the past couple of weeks, she has been having significant hemorrhoidal pain. Had to take a percocet leftover from her . Has been using preparation H and put folded up Tucks pads back there. Had a bowel movement this morning, which was fine. Has not noticed any blood in her stool. Preventative Care  TDaP: May 2017  Pap smear: 2017, per patient report  Mammogram: has never had  Colonoscopy: has never had  Denies early family hx of breast or colon CA. Healthy Habits  Patient is exercising 3 - 4x per week, running. Patient endorses healthy diet; avoids fatty/greasy foods, sugar-sweetened beverages. Denies tobacco use. Social alcohol use. Denies illicit drug use. Sexually active with 1 male partner in last 12 months. Is breastfeeding. Using progestin-only pill right now for birth control. Denies concerns for STIs today.      Past Medical History:   Diagnosis Date    Abnormal Papanicolaou smear of cervix     Anemia     Coagulation disorder (HCC)     thrombocytopenia with pregnancy's    Disease of blood and blood forming organ     thrombocytopenia    Fetal heart disease 2017     Past Surgical History:   Procedure Laterality Date     DELIVERY ONLY      HX  SECTION  2015    HX GYN  10/2015        HX HEENT      T&A     Family History   Problem Relation Age of Onset    No Known Problems Mother     No Known Problems Father      Social History     Social History    Marital status:      Spouse name: Jose De Jesus Daniels Number of children: N/A    Years of education: N/A     Social History Main Topics    Smoking status: Never Smoker    Smokeless tobacco: Never Used    Alcohol use Yes      Comment: occasion    Drug use: No    Sexual activity: Yes     Partners: Male     Birth control/ protection: Pill     Other Topics Concern    None     Social History Narrative     Patient Active Problem List   Diagnosis Code    Temporary low platelet count (HCC) D69.6    Thrombocytopenia (HCC) D69.6    Previous  section complicating pregnancy V75.447     Outpatient Encounter Prescriptions as of 2017   Medication Sig Dispense Refill    SHAROBEL 0.35 mg tab take 1 tablet by mouth once daily  8    cyanocobalamin 1,000 mcg tablet Take 1,000 mcg by mouth daily. Indications: Takes 2500 mcg daily      docusate sodium (COLACE) 100 mg capsule Take 100 mg by mouth two (2) times a day.  hydrocortisone (ANUSOL-HC) 2.5 % rectal cream Insert  into rectum four (4) times daily. 30 g 0    PNV with Ca No.65-Iron Poly-FA 60 mg iron-1 mg cap Take 1 Tab by mouth daily. Indications: PREGNANCY      ferrous sulfate 325 mg (65 mg iron) tablet Take 325 mg by mouth two (2) times a day. Indications: IRON DEFICIENCY ANEMIA      cholecalciferol, vitamin D3, (VITAMIN D3) 2,000 unit tab Take 1 Tab by mouth. Indications: PREVENTION OF VITAMIN D DEFICIENCY      ascorbic acid (VITAMIN C) 500 mg tablet Take 500 mg by mouth daily.       [DISCONTINUED] prenatal vit-breanna-fe fum-fa 27-1 mg tab Take  by mouth.  [DISCONTINUED] cyanocobalamin (VITAMIN B-12) 500 mcg TbER Take  by mouth.  [DISCONTINUED] cyclobenzaprine (FLEXERIL) 10 mg tablet take 1 tablet by mouth once daily if needed  0    [DISCONTINUED] valACYclovir (VALTREX) 1 gram tablet take 1 tablet by mouth once daily  0    [DISCONTINUED] ibuprofen (MOTRIN) 800 mg tablet Take 1 Tab by mouth every eight (8) hours as needed. Indications: Pain 60 Tab 1    [DISCONTINUED] oxyCODONE-acetaminophen (PERCOCET) 5-325 mg per tablet Take 1-2 Tabs by mouth every four (4) hours as needed. Max Daily Amount: 12 Tabs. 30 Tab 0    [DISCONTINUED] OTHER Take  by mouth.  [DISCONTINUED] cyanocobalamin (VITAMIN B12) 100 mcg tablet Take 100 mcg by mouth daily. Indications: PREVENTION OF VITAMIN B12 DEFICIENCY       No facility-administered encounter medications on file as of 7/18/2017. Visit Vitals    /82 (BP 1 Location: Right arm, BP Patient Position: Sitting)    Pulse 65    Temp 99 °F (37.2 °C) (Oral)    Resp 16    Ht 5' 7\" (1.702 m)    Wt 187 lb 6.4 oz (85 kg)    SpO2 99%    BMI 29.35 kg/m2         Review of Systems   Constitutional: Negative for chills and fever. Respiratory: Negative for cough and shortness of breath. Cardiovascular: Negative for chest pain and palpitations. Gastrointestinal: Positive for constipation. Negative for abdominal pain, blood in stool, nausea and vomiting. Neurological: Negative for dizziness, tingling and headaches. Psychiatric/Behavioral: Negative for depression, substance abuse and suicidal ideas. The patient is nervous/anxious. Physical Exam   Constitutional: She is oriented to person, place, and time. She appears well-developed and well-nourished. No distress. HENT:   Head: Normocephalic and atraumatic. Cardiovascular: Normal rate, regular rhythm and normal heart sounds. Pulmonary/Chest: Effort normal and breath sounds normal. No respiratory distress.  She has no wheezes. Genitourinary: Rectal exam shows external hemorrhoid. Rectal exam shows guaiac negative stool. Neurological: She is alert and oriented to person, place, and time. Skin: Skin is warm and dry. She is not diaphoretic. Psychiatric: She has a normal mood and affect. Her behavior is normal. Judgment normal.   Nursing note and vitals reviewed. ASSESSMENT and PLAN    ICD-10-CM ICD-9-CM    1. External hemorrhoid K64.4 455.3 hydrocortisone (ANUSOL-HC) 2.5 % rectal cream   2. Thrombocytopenia (HCC) D69.6 287.5    3. Physical exam, annual Z00.00 V70.0 AMB POC URINALYSIS DIP STICK AUTO W/ MICRO      CBC WITH AUTOMATED DIFF      LIPID PANEL AND CHOL/HDL RATIO      VITAMIN D, 25 HYDROXY      TSH 3RD GENERATION      METABOLIC PANEL, COMPREHENSIVE     1. Establish care  The patient's medications, allergies, and history were all updated today. The patient has signed a records release to request records from OB/GYN. 2. External hemorrhoid  Increase docusate to twice daily. Add anusol PRN. Increase fiber/water intake. 3. Hx thrombocytopenia  Will check labs today. If abnormal PLT, will refer to heme-onc here for further evaluation. Will check basic labs today and return in 1 month for CPE. Follow-up Disposition:  Return in about 1 month (around 8/18/2017) for CPE and lab review.    Noah Neal NP

## 2017-07-18 NOTE — MR AVS SNAPSHOT
Visit Information Date & Time Provider Department Dept. Phone Encounter #  
 7/18/2017  8:00 AM Lashay Mccann NP [facility] [phone] [id number] Follow-up Instructions Return in about 1 month (around 8/18/2017) for CPE and lab review. Upcoming Health Maintenance Date Due  
 PAP AKA CERVICAL CYTOLOGY 11/16/2008 INFLUENZA AGE 9 TO ADULT 8/1/2017 DTaP/Tdap/Td series (2 - Td) 5/1/2027 Allergies as of 7/18/2017  Review Complete On: 7/18/2017 By: Lashay Mccann NP Severity Noted Reaction Type Reactions Morphine  10/09/2015    Hives Current Immunizations  Never Reviewed No immunizations on file. Not reviewed this visit You Were Diagnosed With   
  
 Codes Comments External hemorrhoid    -  Primary ICD-10-CM: X72.8 ICD-9-CM: 473. 3 Thrombocytopenia ([facility] [address]     ICD-10-CM: D69.6 ICD-9-CM: 287.5 Physical exam, annual     ICD-10-CM: Z00.00 ICD-9-CM: V70.0 Vitals BP Pulse Temp Resp Height(growth percentile) Weight(growth percentile) 117/82 (BP 1 Location: Right arm, BP Patient Position: Sitting) 65 99 °F (37.2 °C) (Oral) 16 5' 7\" (1.702 m) 187 lb 6.4 oz (85 kg) SpO2 BMI OB Status Smoking Status 99% 29.35 kg/m2 Recent pregnancy Never Smoker BMI and BSA Data Body Mass Index Body Surface Area  
 29.35 kg/m 2 2 m 2 Preferred Pharmacy Pharmacy Name Phone [facility], [address] [place] [address] [phone] Your Updated Medication List  
  
   
This list is accurate as of: 7/18/17  8:49 AM.  Always use your most recent med list.  
  
  
  
  
 COLACE 100 mg capsule Generic drug:  docusate sodium Take 100 mg by mouth two (2) times a day. cyanocobalamin 1,000 mcg tablet Take 1,000 mcg by mouth daily. Indications: Takes 2500 mcg daily  
  
 ferrous sulfate 325 mg (65 mg iron) tablet Take 325 mg by mouth two (2) times a day. Indications: IRON DEFICIENCY ANEMIA  
  
 hydrocortisone 2.5 % rectal cream  
Commonly known as:  ANUSOL-HC Insert  into rectum four (4) times daily. PNV with Ca No.65-Iron Poly-FA 60 mg iron-1 mg Cap Take 1 Tab by mouth daily. Indications: PREGNANCY SHAROBEL 0.35 mg Tab Generic drug:  norethindrone  
take 1 tablet by mouth once daily VITAMIN C 500 mg tablet Generic drug:  ascorbic acid (vitamin C) Take 500 mg by mouth daily. VITAMIN D3 2,000 unit Tab Generic drug:  cholecalciferol (vitamin D3) Take 1 Tab by mouth. Indications: PREVENTION OF VITAMIN D DEFICIENCY Prescriptions Sent to Pharmacy Refills  
 hydrocortisone (ANUSOL-HC) 2.5 % rectal cream 0 Sig: Insert  into rectum four (4) times daily. Class: Normal  
 [id number] [address] [facility] [address] [address]  #: [phone] Route: Rectal  
  
We Performed the Following AMB POC URINALYSIS DIP STICK AUTO W/ MICRO [42943 CPT(R)] CBC WITH AUTOMATED DIFF [50004 CPT(R)] LIPID PANEL AND CHOL/HDL RATIO [32202 CPT(R)] METABOLIC PANEL, COMPREHENSIVE [07388 CPT(R)] TSH 3RD GENERATION [91290 CPT(R)] VITAMIN D, 25 HYDROXY [id number] CPT(R)] Follow-up Instructions Return in about 1 month (around 8/18/2017) for CPE and lab review. Patient Instructions Hemorrhoids: Care Instructions Your Care Instructions Hemorrhoids are enlarged veins that develop in the anal canal. Bleeding during bowel movements, itching, swelling, and rectal pain are the most common symptoms. They can be uncomfortable at times, but hemorrhoids rarely are a serious problem. You can treat most hemorrhoids with simple changes to your diet and bowel habits. These changes include eating more fiber and not straining to pass stools.  Most hemorrhoids do not need surgery or other treatment unless they are very large and painful or bleed a lot. Follow-up care is a key part of your treatment and safety. Be sure to make and go to all appointments, and call your doctor if you are having problems. Its also a good idea to know your test results and keep a list of the medicines you take. How can you care for yourself at home? · Sit in a few inches of warm water (sitz bath) 3 times a day and after bowel movements. The warm water helps with pain and itching. · Put ice on your anal area several times a day for 10 minutes at a time. Put a thin cloth between the ice and your skin. Follow this by placing a warm, wet towel on the area for another 10 to 20 minutes. · Take pain medicines exactly as directed. ¨ If the doctor gave you a prescription medicine for pain, take it as prescribed. ¨ If you are not taking a prescription pain medicine, ask your doctor if you can take an over-the-counter medicine. · Keep the anal area clean, but be gentle. Use water and a fragrance-free soap, such as [place], or use baby wipes or medicated pads, such as Tucks. · Wear cotton underwear and loose clothing to decrease moisture in the anal area. · Eat more fiber. Include foods such as whole-grain breads and cereals, raw vegetables, raw and dried fruits, and beans. · Drink plenty of fluids, enough so that your urine is light yellow or clear like water. If you have kidney, heart, or liver disease and have to limit fluids, talk with your doctor before you increase the amount of fluids you drink. · Use a stool softener that contains bran or psyllium. You can save money by buying bran or psyllium (available in bulk at most health food stores) and sprinkling it on foods or stirring it into fruit juice. Or you can use a product such as Metamucil or Hydrocil. · Practice healthy bowel habits. ¨ Go to the bathroom as soon as you have the urge. ¨ Avoid straining to pass stools.  Relax and give yourself time to let things happen naturally. ¨ Do not hold your breath while passing stools. ¨ Do not read while sitting on the toilet. Get off the toilet as soon as you have finished. · Take your medicines exactly as prescribed. Call your doctor if you think you are having a problem with your medicine. When should you call for help? Call 911 anytime you think you may need emergency care. For example, call if: 
· You pass maroon or very bloody stools. Call your doctor now or seek immediate medical care if: 
· You have increased pain. · You have increased bleeding. Watch closely for changes in your health, and be sure to contact your doctor if: 
· Your symptoms have not improved after 3 or 4 days. Where can you learn more? Go to http://danna-rupesh.info/. Enter F228 in the search box to learn more about \"Hemorrhoids: Care Instructions. \" Current as of: August 9, 2016 Content Version: 11.3 © 1341-5588 [employer]. Care instructions adapted under license by [employer] (which disclaims liability or warranty for this information). If you have questions about a medical condition or this instruction, always ask your healthcare professional. [place] [address] any warranty or liability for your use of this information. Introducing [facility]! Yaritza Nam introduces [email or web address] patient portal. Now you can access parts of your medical record, email your doctor's office, and request medication refills online. 1. In your internet browser, go to [email or web address] 2. Click on the First Time User? Click Here link in the Sign In box. You will see the New Member Sign Up page. 3. Enter your [email or web address] Access Code exactly as it appears below. You will not need to use this code after youve completed the sign-up process. If you do not sign up before the expiration date, you must request a new code. · [email or web address] Access Code: [email or web address] Expires: 8/9/2017  3:20 PM 
 
4. Enter the last four digits of your Social Security Number (xxxx) and Date of Birth (mm/dd/yyyy) as indicated and click Submit. You will be taken to the next sign-up page. 5. Create a MindQuilt ID. This will be your MindQuilt login ID and cannot be changed, so think of one that is secure and easy to remember. 6. Create a MindQuilt password. You can change your password at any time. 7. Enter your Password Reset Question and Answer. This can be used at a later time if you forget your password. 8. Enter your e-mail address. You will receive e-mail notification when new information is available in [address] 9. Click Sign Up. You can now view and download portions of your medical record. 10. Click the Download Summary menu link to download a portable copy of your medical information. If you have questions, please visit the Frequently Asked Questions section of the MindQuilt website. Remember, MindQuilt is NOT to be used for urgent needs. For medical emergencies, dial 911. Now available from your iPhone and Android! Please provide this summary of care documentation to your next provider. Your primary care clinician is listed as Rose Navarro. If you have any questions after today's visit, please call [phone].

## 2017-07-18 NOTE — PROGRESS NOTES
Chief Complaint   Patient presents with   [facility] Establish Care     Patient here to establish care. Previous PCP: none at    Patient sees the following specialist 1) OB/GYN - Dr Jean Alvarez 2) [address] - no name or appointment info given to patient as of today  Release of Medical Information signed by patient today yes  Patient Concerns 1) hemorrhoids      Patient graduated from [place]. Just moved here from [facility].   Recently had baby on June 1, 2017

## 2017-07-19 LAB
25(OH)D3+25(OH)D2 SERPL-MCNC: 55.7 NG/ML (ref 30–100)
ALBUMIN SERPL-MCNC: 4.3 G/DL (ref 3.5–5.5)
ALBUMIN/GLOB SERPL: 1.6 {RATIO} (ref 1.2–2.2)
ALP SERPL-CCNC: 72 IU/L (ref 39–117)
ALT SERPL-CCNC: 17 IU/L (ref 0–32)
AST SERPL-CCNC: 19 IU/L (ref 0–40)
BASOPHILS # BLD AUTO: 0 X10E3/UL (ref 0–0.2)
BASOPHILS NFR BLD AUTO: 1 %
BILIRUB SERPL-MCNC: 0.5 MG/DL (ref 0–1.2)
BUN SERPL-MCNC: 16 MG/DL (ref 6–20)
BUN/CREAT SERPL: 20 (ref 9–23)
CALCIUM SERPL-MCNC: 9.2 MG/DL (ref 8.7–10.2)
CHLORIDE SERPL-SCNC: 101 MMOL/L (ref 96–106)
CHOLEST SERPL-MCNC: 171 MG/DL (ref 100–199)
CHOLEST/HDLC SERPL: 3.6 RATIO UNITS (ref 0–4.4)
CO2 SERPL-SCNC: 22 MMOL/L (ref 18–29)
CREAT SERPL-MCNC: 0.82 MG/DL (ref 0.57–1)
EOSINOPHIL # BLD AUTO: 0.4 X10E3/UL (ref 0–0.4)
EOSINOPHIL NFR BLD AUTO: 6 %
ERYTHROCYTE [DISTWIDTH] IN BLOOD BY AUTOMATED COUNT: 13 % (ref 12.3–15.4)
GLOBULIN SER CALC-MCNC: 2.7 G/DL (ref 1.5–4.5)
GLUCOSE SERPL-MCNC: 76 MG/DL (ref 65–99)
HCT VFR BLD AUTO: 43.6 % (ref 34–46.6)
HDLC SERPL-MCNC: 47 MG/DL
HGB BLD-MCNC: 13.9 G/DL (ref 11.1–15.9)
IMM GRANULOCYTES # BLD: 0 X10E3/UL (ref 0–0.1)
IMM GRANULOCYTES NFR BLD: 0 %
INTERPRETATION, 910389: NORMAL
LDLC SERPL CALC-MCNC: 94 MG/DL (ref 0–99)
LYMPHOCYTES # BLD AUTO: 1.9 X10E3/UL (ref 0.7–3.1)
LYMPHOCYTES NFR BLD AUTO: 32 %
MCH RBC QN AUTO: 31.2 PG (ref 26.6–33)
MCHC RBC AUTO-ENTMCNC: 31.9 G/DL (ref 31.5–35.7)
MCV RBC AUTO: 98 FL (ref 79–97)
MONOCYTES # BLD AUTO: 0.5 X10E3/UL (ref 0.1–0.9)
MONOCYTES NFR BLD AUTO: 8 %
MORPHOLOGY BLD-IMP: ABNORMAL
NEUTROPHILS # BLD AUTO: 3.2 X10E3/UL (ref 1.4–7)
NEUTROPHILS NFR BLD AUTO: 53 %
PLATELET # BLD AUTO: 88 X10E3/UL (ref 150–379)
POTASSIUM SERPL-SCNC: 4 MMOL/L (ref 3.5–5.2)
PROT SERPL-MCNC: 7 G/DL (ref 6–8.5)
RBC # BLD AUTO: 4.45 X10E6/UL (ref 3.77–5.28)
SODIUM SERPL-SCNC: 144 MMOL/L (ref 134–144)
TRIGL SERPL-MCNC: 151 MG/DL (ref 0–149)
TSH SERPL DL<=0.005 MIU/L-ACNC: 1.06 UIU/ML (ref 0.45–4.5)
VLDLC SERPL CALC-MCNC: 30 MG/DL (ref 5–40)
WBC # BLD AUTO: 6 X10E3/UL (ref 3.4–10.8)

## 2017-07-20 ENCOUNTER — TELEPHONE (OUTPATIENT)
Dept: FAMILY MEDICINE CLINIC | Age: 32
End: 2017-07-20

## 2017-07-20 NOTE — TELEPHONE ENCOUNTER
----- Message from Simon Krueger NP sent at 7/20/2017  7:39 AM EDT -----  Please let cailin know that her platelets remain low; we will discuss the remainder of her results at her office visit in August, but please let her know that I'm putting in a referral for her to see heme-onc with Nella Arevalo. Thanks!   CAB

## 2017-07-20 NOTE — PROGRESS NOTES
Please let cailin know that her platelets remain low; we will discuss the remainder of her results at her office visit in August, but please let her know that I'm putting in a referral for her to see heme-onc with Yaritza Nam. Thanks!   CAB

## 2017-07-20 NOTE — TELEPHONE ENCOUNTER
Patient notified of low platelet count and that remainder of results will be reviewed at follow up office visit. Patient notified of referral to hematology to Dr. Bessy Borrero. Patient stated she already has appointment for hematology on Aug 4, 2017 at [facility] that her gyn referred her to. Patient verbalized understanding.

## 2017-07-25 ENCOUNTER — DOCUMENTATION ONLY (OUTPATIENT)
Dept: FAMILY MEDICINE CLINIC | Age: 32
End: 2017-07-25

## 2017-07-25 NOTE — PROGRESS NOTES
Records received from [address] [place] [address], Dr. Margoth Goldman. Hx LGSIL 2015 (unknown date). Colpo done in 2015, but no results listed. Hx HSV2. Will plan to repeat pap at CPE next month and send to gyn if needed.

## 2017-12-29 ENCOUNTER — TELEPHONE (OUTPATIENT)
Dept: FAMILY MEDICINE CLINIC | Age: 32
End: 2017-12-29

## 2017-12-29 NOTE — TELEPHONE ENCOUNTER
Pt.is requesting for someone to put in a referral in for her to see an Hematologist she can be reached @[phone]

## 2018-01-02 NOTE — TELEPHONE ENCOUNTER
Patient requested a referral for Hematology. Patient saw hematology at [facility] per patient's gyn but patient was very dissatisfied with experienced. Patient given phone number and name for hematologist referred by Martin Lopez NP. Dr. Jud Ramírez. Zachery at [address]. Patient to schedule own appointment. Patient verbalized understanding.

## 2018-02-16 ENCOUNTER — OFFICE VISIT (OUTPATIENT)
Dept: FAMILY MEDICINE CLINIC | Age: 33
End: 2018-02-16

## 2018-02-16 VITALS
HEART RATE: 73 BPM | RESPIRATION RATE: 16 BRPM | TEMPERATURE: 98.4 F | DIASTOLIC BLOOD PRESSURE: 76 MMHG | HEIGHT: 67 IN | BODY MASS INDEX: 27.12 KG/M2 | OXYGEN SATURATION: 99 % | SYSTOLIC BLOOD PRESSURE: 106 MMHG | WEIGHT: 172.8 LBS

## 2018-02-16 DIAGNOSIS — N94.6 DYSMENORRHEA: ICD-10-CM

## 2018-02-16 DIAGNOSIS — D69.6 THROMBOCYTOPENIA (HCC): ICD-10-CM

## 2018-02-16 DIAGNOSIS — F33.1 MODERATE EPISODE OF RECURRENT MAJOR DEPRESSIVE DISORDER (HCC): Primary | ICD-10-CM

## 2018-02-16 RX ORDER — VENLAFAXINE HYDROCHLORIDE 37.5 MG/1
37.5 CAPSULE, EXTENDED RELEASE ORAL DAILY
Qty: 30 CAP | Refills: 1 | Status: SHIPPED | OUTPATIENT
Start: 2018-02-16 | End: 2018-03-13 | Stop reason: DRUGHIGH

## 2018-02-16 RX ORDER — MULTIVIT-MIN/IRON/FOLIC ACID/K 18-600-40
CAPSULE ORAL
COMMUNITY
End: 2018-02-16

## 2018-02-16 RX ORDER — ALPRAZOLAM 0.5 MG/1
0.5 TABLET ORAL
Qty: 12 TAB | Refills: 0 | Status: SHIPPED | OUTPATIENT
Start: 2018-02-16 | End: 2018-03-22

## 2018-02-16 NOTE — PROGRESS NOTES
HISTORY OF PRESENT ILLNESS  Elyse Candido Bumpers is a 29 y.o. female. HPI  Here for CPE, but acute concern is with depression today. Has not followed up here since July 2017. Works as a nurse at [employer]. Depression/Anxiety  Feels like she is having severe post-partum depression/anxiety. Feels like she has no reason to be upset as she has a great life, but it has gotten worse over the past few weeks. Has dealt with it over her whole life, but has never been treated. Last night was especially rough; she has 2 young kids and her  works overnights, and she just laid on the floor crying. She went to the [place] years ago and got vistaril, which didn't help at all, but was on celexa and stopped it when she got pregnant. She thinks the celexa helped, though it wasn't that long that she was on it. Did counseling for a little while, but found excuses not to go. Wants to be 100% for her kids. Thrombocytopenia - Hx of this, got worse during pregnancy. Saw heme-onc at [facility], but never got any follow-up set up there and isn't sure what the plan was. Wants the info to schedule with Dr. Avinash Amezcua. Has frequent bruising. Hx HSV  Has been told that her bloodwork was positive for herpes, but has never had an outbreak. Current Specialists:  1. [address] - OB/GYN; last visit summer 2017 (not planningto return)      Preventative Care  TDaP: May 2017  Pap smear: July 2017, per patient report; was normal after history of abnormal pap smear  Mammogram: has never had  Colonoscopy: has never had  Denies early family hx of breast or colon CA. Healthy Habits  Patient is exercising 3 - 4x per week, running, and active with her kids. Patient endorses healthy diet; avoids fatty/greasy foods, sugar-sweetened beverages. Denies tobacco use. Social alcohol use. Denies illicit drug use. Sexually active with 1 male partner in last 12 months.      Not using anything for birth control right now, though her  has had a vasectomy. Wondering if she should get back on the pill as her periods are irregular, heavy, and painful. Denies concerns for STIs today. Past Medical History:   Diagnosis Date    Abnormal Papanicolaou smear of cervix     Anemia     Coagulation disorder (HCC)     thrombocytopenia with pregnancy's    Disease of blood and blood forming organ     thrombocytopenia    Fetal heart disease 2017     Past Surgical History:   Procedure Laterality Date     DELIVERY ONLY      HX  SECTION  2015    HX GYN  10/2015        HX HEENT      T&A     Family History   Problem Relation Age of Onset    No Known Problems Mother     No Known Problems Father      Social History     Social History    Marital status:      Spouse name: Shubham Peres Number of children: N/A    Years of education: N/A     Social History Main Topics    Smoking status: Never Smoker    Smokeless tobacco: Never Used    Alcohol use Yes      Comment: occasion    Drug use: No    Sexual activity: Yes     Partners: Male     Birth control/ protection: Pill     Other Topics Concern    None     Social History Narrative     Patient Active Problem List   Diagnosis Code    Temporary low platelet count (HCC) D69.6    Thrombocytopenia (HCC) D69.6    Previous  section complicating pregnancy S87.008    Dysmenorrhea N94.6    Moderate episode of recurrent major depressive disorder ([facility] [address] F33.1     Outpatient Encounter Prescriptions as of 2018   Medication Sig Dispense Refill    venlafaxine-SR (EFFEXOR-XR) 37.5 mg capsule Take 1 Cap by mouth daily. Indications: major depressive disorder 30 Cap 1    ALPRAZolam (XANAX) 0.5 mg tablet Take 1 Tab by mouth three (3) times daily as needed for Anxiety. Max Daily Amount: 1.5 mg. 12 Tab 0    [DISCONTINUED] ascorbic acid, vitamin C, 500 mg cap Take  by mouth.       [DISCONTINUED] SHAROBEL 0.35 mg tab take 1 tablet by mouth once daily  8    [DISCONTINUED] cyanocobalamin 1,000 mcg tablet Take 1,000 mcg by mouth daily. Indications: Takes 2500 mcg daily      [DISCONTINUED] docusate sodium (COLACE) 100 mg capsule Take 100 mg by mouth two (2) times a day.  [DISCONTINUED] hydrocortisone (ANUSOL-HC) 2.5 % rectal cream Insert  into rectum four (4) times daily. 30 g 0    [DISCONTINUED] PNV with Ca No.65-Iron Poly-FA 60 mg iron-1 mg cap Take 1 Tab by mouth daily. Indications: PREGNANCY      [DISCONTINUED] ferrous sulfate 325 mg (65 mg iron) tablet Take 325 mg by mouth two (2) times a day. Indications: IRON DEFICIENCY ANEMIA      [DISCONTINUED] cholecalciferol, vitamin D3, (VITAMIN D3) 2,000 unit tab Take 1 Tab by mouth. Indications: PREVENTION OF VITAMIN D DEFICIENCY      [DISCONTINUED] ascorbic acid (VITAMIN C) 500 mg tablet Take 500 mg by mouth daily. No facility-administered encounter medications on file as of 2/16/2018. Visit Vitals    /76 (BP 1 Location: Right arm, BP Patient Position: Sitting)    Pulse 73    Temp 98.4 °F (36.9 °C) (Oral)    Resp 16    Ht 5' 7\" (1.702 m)    Wt 172 lb 12.8 oz (78.4 kg)    LMP 02/14/2018    SpO2 99%    BMI 27.06 kg/m2         Review of Systems   Constitutional: Negative for chills, fever and malaise/fatigue. Eyes: Negative for blurred vision and double vision. Respiratory: Negative for cough and shortness of breath. Cardiovascular: Negative for chest pain, palpitations, orthopnea and leg swelling. Neurological: Negative for dizziness and headaches. Psychiatric/Behavioral: Positive for depression. Negative for hallucinations, memory loss, substance abuse and suicidal ideas. The patient is nervous/anxious. The patient does not have insomnia. Physical Exam   Constitutional: She is oriented to person, place, and time. She appears well-developed and well-nourished. No distress. HENT:   Head: Normocephalic and atraumatic.    Cardiovascular: Normal rate, regular rhythm and normal heart sounds. Pulmonary/Chest: Effort normal and breath sounds normal. No respiratory distress. She has no wheezes. Neurological: She is alert and oriented to person, place, and time. Skin: Skin is warm and dry. She is not diaphoretic. Psychiatric: Her speech is normal and behavior is normal. Judgment normal. Thought content is not paranoid and not delusional. Cognition and memory are normal. She exhibits a depressed mood. She expresses no homicidal and no suicidal ideation. tearful   Nursing note and vitals reviewed. ASSESSMENT and PLAN    ICD-10-CM ICD-9-CM    1. Moderate episode of recurrent major depressive disorder (HCC) F33.1 296.32 venlafaxine-SR (EFFEXOR-XR) 37.5 mg capsule      ALPRAZolam (XANAX) 0.5 mg tablet      REFERRAL TO BEHAVIORAL HEALTH   2. Thrombocytopenia (HCC) D69.6 287.5    3. Dysmenorrhea N94.6 625.3      CPE declined today; will do in June/July 2018 when pap is due again. Instead, addressed depression. Start effexor 37.5mg once daily, along with #12 x 0 xanax 0.5mg; discussed that the xanax will not be continued long-term, but is there as needed for short-term use as effexor becomes effective. Referral also placed to [facility], for one-time counseling/evaluation. Patient given information to schedule with Dr. Baljeet Neri for thrombocytopenia follow up, as she was unsatisfied at [facility]. Will print out and scan her lab results from [facility]. Will defer starting OCPs for dysmenorrhea until after clearance from heme-onc, as she thinks she may have a clotting disorder. Return in 1 month for med follow-up. Follow-up Disposition:  Return in about 1 month (around 3/16/2018) for med f/u.

## 2018-02-16 NOTE — PATIENT INSTRUCTIONS
Depression Treatment: Care Instructions  Your Care Instructions    Depression is a condition that affects the way you feel, think, and act. It causes symptoms such as low energy, loss of interest in daily activities, and sadness or grouchiness that goes on for a long time. Depression is very common and affects men and women of all ages. Depression is a medical illness caused by changes in the natural chemicals in your brain. It is not a character flaw, and it does not mean that you are a bad or weak person. It does not mean that you are going crazy. It is important to know that depression can be treated. Medicines, counseling, and self-care can all help. Many people do not get help because they are embarrassed or think that they will get over the depression on their own. But some people do not get better without treatment. Follow-up care is a key part of your treatment and safety. Be sure to make and go to all appointments, and call your doctor if you are having problems. It's also a good idea to know your test results and keep a list of the medicines you take. How can you care for yourself at home? Learn about antidepressant medicines  Antidepressant medicines can improve or end the symptoms of depression. You may need to take the medicine for at least 6 months, and often longer. Keep taking your medicine even if you feel better. If you stop taking it too soon, your symptoms may come back or get worse. You may start to feel better within 1 to 3 weeks of taking antidepressant medicine. But it can take as many as 6 to 8 weeks to see more improvement. Talk to your doctor if you have problems with your medicine or if you do not notice any improvement after 3 weeks. Antidepressants can make you feel tired, dizzy, or nervous. Some people have dry mouth, constipation, headaches, sexual problems, an upset stomach, or diarrhea.  Many of these side effects are mild and go away on their own after you take the medicine for a few weeks. Some may last longer. Talk to your doctor if side effects bother you too much. You might be able to try a different medicine. If you are pregnant or breastfeeding, talk to your doctor about what medicines you can take. Learn about counseling  In many cases, counseling can work as well as medicines to treat mild to moderate depression. Counseling is done by licensed mental health providers, such as psychologists, social workers, and some types of nurses. It can be done in one-on-one sessions or in a group setting. Many people find group sessions helpful. Cognitive-behavioral therapy is a type of counseling. In this treatment therapy, you learn how to see and change unhelpful thinking styles that may be adding to your depression. Counseling and medicines often work well when used together. To manage depression  · Be physically active. Getting 30 minutes of exercise each day is good for your body and your mind. Begin slowly if it is hard for you to get started. If you already exercise, keep it up. · Plan something pleasant for yourself every day. Include activities that you have enjoyed in the past.  · Get enough sleep. Talk to your doctor if you have problems sleeping. · Eat a balanced diet. If you do not feel hungry, eat small snacks rather than large meals. · Do not drink alcohol, use illegal drugs, or take medicines that your doctor has not prescribed for you. They may interfere with your treatment. · Spend time with family and friends. It may help to speak openly about your depression with people you trust.  · Take your medicines exactly as prescribed. Call your doctor if you think you are having a problem with your medicine. · Do not make major life decisions while you are depressed. Depression may change the way you think. You will be able to make better decisions after you feel better. · Think positively.  Challenge negative thoughts with statements such as \"I am hopeful\"; \"Things will get better\"; and \"I can ask for the help I need. \" Write down these statements and read them often, even if you don't believe them yet. · Be patient with yourself. It took time for your depression to develop, and it will take time for your symptoms to improve. Do not take on too much or be too hard on yourself. · Learn all you can about depression from written and online materials. · Check out behavioral health classes to learn more about dealing with depression. · Keep the numbers for these national suicide hotlines: 2-079-740-TALK (1-319.105.3340) and 0-276-WNGDQFO (7-134.823.7762). If you or someone you know talks about suicide or feeling hopeless, get help right away. When should you call for help? Call 911 anytime you think you may need emergency care. For example, call if:  ? · You feel you cannot stop from hurting yourself or someone else. ?Call your doctor now or seek immediate medical care if:  ? · You hear voices. ? · You feel much more depressed. ? Watch closely for changes in your health, and be sure to contact your doctor if:  ? · You are having problems with your depression medicine. ? · You are not getting better as expected. Where can you learn more? Go to http://danna-rupesh.info/. Enter P866 in the search box to learn more about \"Depression Treatment: Care Instructions. \"  Current as of: May 12, 2017  Content Version: 11.4  © 3890-5228 Taggstar. Care instructions adapted under license by SandForce (which disclaims liability or warranty for this information). If you have questions about a medical condition or this instruction, always ask your healthcare professional. [address] any warranty or liability for your use of this information.

## 2018-02-16 NOTE — PROGRESS NOTES
Chief Complaint   Patient presents with    Complete Physical     Patient here for cpe. Having post partum issues.

## 2018-02-16 NOTE — MR AVS SNAPSHOT
[facility] 
 
 
 [address] [facility] [address] [address] 
[phone] Patient: Kassie Schuster MRN: [id number] :1985 Visit Information Date & Time Provider Department Dept. Phone Encounter #  
 2018 11:30 AM Jimmie Cahcon NP [facility] [phone] [id number] Follow-up Instructions Return in about 1 month (around 3/16/2018) for med f/u. Upcoming Health Maintenance Date Due  
 PAP AKA CERVICAL CYTOLOGY 2008 DTaP/Tdap/Td series (2 - Td) 2027 Allergies as of 2018  Review Complete On: 2018 By: Jimmie Chacon NP Severity Noted Reaction Type Reactions Morphine  10/09/2015    Hives Current Immunizations  Never Reviewed No immunizations on file. Not reviewed this visit You Were Diagnosed With   
  
 Codes Comments Moderate episode of recurrent major depressive disorder ([facility] 75.)    -  Primary ICD-10-CM: F33.1 ICD-9-CM: 296.32 Thrombocytopenia ([facility]     ICD-10-CM: D69.6 ICD-9-CM: 287.5 Vitals BP Pulse Temp Resp Height(growth percentile) Weight(growth percentile) 106/76 (BP 1 Location: Right arm, BP Patient Position: Sitting) 73 98.4 °F (36.9 °C) (Oral) 16 5' 7\" (1.702 m) 172 lb 12.8 oz (78.4 kg) LMP SpO2 BMI OB Status Smoking Status 2018 99% 27.06 kg/m2 Having regular periods Never Smoker BMI and BSA Data Body Mass Index Body Surface Area  
 27.06 kg/m 2 1.93 m 2 Preferred Pharmacy Pharmacy Name Phone [id number] [address] [facility] December [phone] Your Updated Medication List  
  
   
This list is accurate as of: 18 12:35 PM.  Always use your most recent med list.  
  
  
  
  
 ALPRAZolam 0.5 mg tablet Commonly known as:  Regan Freddie Take 1 Tab by mouth three (3) times daily as needed for Anxiety. Max Daily Amount: 1.5 mg.  
  
 venlafaxine-SR 37.5 mg capsule Commonly known as:  EFFEXOR-XR Take 1 Cap by mouth daily. Indications: major depressive disorder Prescriptions Printed Refills ALPRAZolam (XANAX) 0.5 mg tablet 0 Sig: Take 1 Tab by mouth three (3) times daily as needed for Anxiety. Max Daily Amount: 1.5 mg.  
 Class: Print Route: Oral  
  
Prescriptions Sent to Pharmacy Refills  
 venlafaxine-SR (EFFEXOR-XR) 37.5 mg capsule 1 Sig: Take 1 Cap by mouth daily. Indications: major depressive disorder Class: Normal  
 Pharmacy: [id number] [address], [address] #: [phone] Route: Oral  
  
We Performed the Following REFERRAL TO BEHAVIORAL HEALTH [REF8 Custom] Follow-up Instructions Return in about 1 month (around 3/16/2018) for med f/u. Referral Information Referral ID Referred By Referred To  
  
 [id number] [facility], [address] [place], [address] Phone: [phone] Fax: [phone] Visits Status Start Date End Date 1 New Request 2/16/18 2/16/19 If your referral has a status of pending review or denied, additional information will be sent to support the outcome of this decision. Patient Instructions Depression Treatment: Care Instructions Your Care Instructions Depression is a condition that affects the way you feel, think, and act. It causes symptoms such as low energy, loss of interest in daily activities, and sadness or grouchiness that goes on for a long time. Depression is very common and affects men and women of all ages. Depression is a medical illness caused by changes in the natural chemicals in your brain. It is not a character flaw, and it does not mean that you are a bad or weak person. It does not mean that you are going crazy. It is important to know that depression can be treated.  Medicines, counseling, and self-care can all help. Many people do not get help because they are embarrassed or think that they will get over the depression on their own. But some people do not get better without treatment. Follow-up care is a key part of your treatment and safety. Be sure to make and go to all appointments, and call your doctor if you are having problems. It's also a good idea to know your test results and keep a list of the medicines you take. How can you care for yourself at home? Learn about antidepressant medicines Antidepressant medicines can improve or end the symptoms of depression. You may need to take the medicine for at least 6 months, and often longer. Keep taking your medicine even if you feel better. If you stop taking it too soon, your symptoms may come back or get worse. You may start to feel better within 1 to 3 weeks of taking antidepressant medicine. But it can take as many as 6 to 8 weeks to see more improvement. Talk to your doctor if you have problems with your medicine or if you do not notice any improvement after 3 weeks. Antidepressants can make you feel tired, dizzy, or nervous. Some people have dry mouth, constipation, headaches, sexual problems, an upset stomach, or diarrhea. Many of these side effects are mild and go away on their own after you take the medicine for a few weeks. Some may last longer. Talk to your doctor if side effects bother you too much. You might be able to try a different medicine. If you are pregnant or breastfeeding, talk to your doctor about what medicines you can take. Learn about counseling In many cases, counseling can work as well as medicines to treat mild to moderate depression. Counseling is done by licensed mental health providers, such as psychologists, social workers, and some types of nurses. It can be done in one-on-one sessions or in a group setting. Many people find group sessions helpful. Cognitive-behavioral therapy is a type of counseling. In this treatment therapy, you learn how to see and change unhelpful thinking styles that may be adding to your depression. Counseling and medicines often work well when used together. To manage depression · Be physically active. Getting 30 minutes of exercise each day is good for your body and your mind. Begin slowly if it is hard for you to get started. If you already exercise, keep it up. · Plan something pleasant for yourself every day. Include activities that you have enjoyed in the past. 
· Get enough sleep. Talk to your doctor if you have problems sleeping. · Eat a balanced diet. If you do not feel hungry, eat small snacks rather than large meals. · Do not drink alcohol, use illegal drugs, or take medicines that your doctor has not prescribed for you. They may interfere with your treatment. · Spend time with family and friends. It may help to speak openly about your depression with people you trust. 
· Take your medicines exactly as prescribed. Call your doctor if you think you are having a problem with your medicine. · Do not make major life decisions while you are depressed. Depression may change the way you think. You will be able to make better decisions after you feel better. · Think positively. Challenge negative thoughts with statements such as \"I am hopeful\"; \"Things will get better\"; and \"I can ask for the help I need. \" Write down these statements and read them often, even if you don't believe them yet. · Be patient with yourself. It took time for your depression to develop, and it will take time for your symptoms to improve. Do not take on too much or be too hard on yourself. · Learn all you can about depression from written and online materials. · Check out behavioral health classes to learn more about dealing with depression.  
· Keep the numbers for these national suicide hotlines: 2-513-529-TALK (7-168-208-445-094-9707) and 4-903-QIAMNRV (2-167-543-988-962-1108). If you or someone you know talks about suicide or feeling hopeless, get help right away. When should you call for help? Call 911 anytime you think you may need emergency care. For example, call if: 
? · You feel you cannot stop from hurting yourself or someone else. ?Call your doctor now or seek immediate medical care if: 
? · You hear voices. ? · You feel much more depressed. ? Watch closely for changes in your health, and be sure to contact your doctor if: 
? · You are having problems with your depression medicine. ? · You are not getting better as expected. Where can you learn more? Go to http://danna-rupesh.info/. Enter N982 in the search box to learn more about \"Depression Treatment: Care Instructions. \" Current as of: May 12, 2017 Content Version: 11.4 © 8332-0212 Skillz. Care instructions adapted under license by Kiddify (which disclaims liability or warranty for this information). If you have questions about a medical condition or this instruction, always ask your healthcare professional. [place] [address] any warranty or liability for your use of this information. Introducing [facility]! [facility] introduces popexpert patient portal. Now you can access parts of your medical record, email your doctor's office, and request medication refills online. 1. In your internet browser, go to [email or web address] 2. Click on the First Time User? Click Here link in the Sign In box. You will see the New Member Sign Up page. 3. Enter your popexpert Access Code exactly as it appears below. You will not need to use this code after youve completed the sign-up process. If you do not sign up before the expiration date, you must request a new code. · popexpert Access Code: [email or web address] Expires: 5/17/2018 11:18 AM 
 
 4. Enter the last four digits of your Social Security Number (xxxx) and Date of Birth (mm/dd/yyyy) as indicated and click Submit. You will be taken to the next sign-up page. 5. Create a Hexadite ID. This will be your Hexadite login ID and cannot be changed, so think of one that is secure and easy to remember. 6. Create a Hexadite password. You can change your password at any time. 7. Enter your Password Reset Question and Answer. This can be used at a later time if you forget your password. 8. Enter your e-mail address. You will receive e-mail notification when new information is available in [address] 9. Click Sign Up. You can now view and download portions of your medical record. 10. Click the Download Summary menu link to download a portable copy of your medical information. If you have questions, please visit the Frequently Asked Questions section of the Hexadite website. Remember, Hexadite is NOT to be used for urgent needs. For medical emergencies, dial 911. Now available from your iPhone and Android! Please provide this summary of care documentation to your next provider. Your primary care clinician is listed as Suma Gaffney. If you have any questions after today's visit, please call [phone].

## 2018-02-19 ENCOUNTER — TELEPHONE (OUTPATIENT)
Dept: FAMILY MEDICINE CLINIC | Age: 33
End: 2018-02-19

## 2018-02-19 NOTE — TELEPHONE ENCOUNTER
Patient contacted regarding [facility] records. Patient was seen there under her maiden name - Jagdish. Patient unable to schedule appointment for Gretchen Catalan at this time due to schedule.

## 2018-02-19 NOTE — TELEPHONE ENCOUNTER
----- Message from Alexei Iglesias NP sent at 2/16/2018  1:18 PM EST -----  Regarding: [facility] records  Please call patient to confirm the name she used at [facility] for her heme-onc visit; I don't see any record of her there with her name and birthdate that we have on file. Once she gets her appt set up with Dr. Mahnaz Uribe, she should print out/bring her lab records [facility] to that appt.   Thanks!!  CAB

## 2018-02-19 NOTE — TELEPHONE ENCOUNTER
Spoke with patient regarding [facility] records. Per patient when she was not  at that time and her maiden name is Jagdish. Patient instructed to print lab results to take to appointment with Dr. Gaby Doherty. Patient stated she has done this. Per psr/ scheduling for Jah Romero at [facility] patient will call back to schedule as Jah Romero sees adults on Thursday and patient not sure when she would be to schedule for a Thursday appointment.

## 2018-02-19 NOTE — TELEPHONE ENCOUNTER
----- Message from Aramis Pacheco NP sent at 2/16/2018  1:18 PM EST -----  Regarding: [facility] records  Please call patient to confirm the name she used at [facility] for her heme-onc visit; I don't see any record of her there with her name and birthdate that we have on file. Once she gets her appt set up with Dr. Gaby Doherty, she should print out/bring her lab records [facility] to that appt.   Thanks!!  CAB

## 2018-03-06 ENCOUNTER — OFFICE VISIT (OUTPATIENT)
Dept: FAMILY MEDICINE CLINIC | Age: 33
End: 2018-03-06

## 2018-03-06 VITALS
HEART RATE: 83 BPM | DIASTOLIC BLOOD PRESSURE: 73 MMHG | HEIGHT: 67 IN | OXYGEN SATURATION: 97 % | WEIGHT: 174 LBS | SYSTOLIC BLOOD PRESSURE: 119 MMHG | RESPIRATION RATE: 16 BRPM | TEMPERATURE: 97.9 F | BODY MASS INDEX: 27.31 KG/M2

## 2018-03-06 DIAGNOSIS — J01.90 ACUTE SINUSITIS, RECURRENCE NOT SPECIFIED, UNSPECIFIED LOCATION: Primary | ICD-10-CM

## 2018-03-06 DIAGNOSIS — R05.9 COUGH: ICD-10-CM

## 2018-03-06 RX ORDER — PREDNISONE 20 MG/1
20 TABLET ORAL
Qty: 3 TAB | Refills: 0 | Status: SHIPPED | OUTPATIENT
Start: 2018-03-06 | End: 2018-03-09

## 2018-03-06 RX ORDER — BENZONATATE 200 MG/1
200 CAPSULE ORAL
Qty: 21 CAP | Refills: 0 | Status: SHIPPED | OUTPATIENT
Start: 2018-03-06 | End: 2018-03-13

## 2018-03-06 RX ORDER — AMOXICILLIN AND CLAVULANATE POTASSIUM 875; 125 MG/1; MG/1
1 TABLET, FILM COATED ORAL 2 TIMES DAILY
Qty: 20 TAB | Refills: 0 | Status: SHIPPED | OUTPATIENT
Start: 2018-03-06 | End: 2018-03-16

## 2018-03-06 NOTE — MR AVS SNAPSHOT
[facility] 
 
 
 [address] [facility] [address] [id number] [phone] Patient: Mahin Matson MRN: [id number] :1985 Visit Information Date & Time Provider Department Dept. Phone Encounter #  
 3/6/2018  9:30 AM Kika Lira NP [facility] [phone] [id number] Follow-up Instructions Return if symptoms worsen or fail to improve. Your Appointments 3/13/2018  3:30 PM  
Any with Kika Lira NP [facility] [address] [facility]) Appt Note: med follow up  
 [address] [facility] [id number] [phone] [address] [facility] [address] Upcoming Health Maintenance Date Due  
 PAP AKA CERVICAL CYTOLOGY 2008 DTaP/Tdap/Td series (2 - Td) 2027 Allergies as of 3/6/2018  Review Complete On: 3/6/2018 By: Kika Lira NP Severity Noted Reaction Type Reactions Morphine  10/09/2015    Hives Current Immunizations  Never Reviewed No immunizations on file. Not reviewed this visit You Were Diagnosed With   
  
 Codes Comments Acute sinusitis, recurrence not specified, unspecified location    -  Primary ICD-10-CM: J01.90 ICD-9-CM: 461.9 Cough     ICD-10-CM: R05 ICD-9-CM: 894. 2 Vitals BP Pulse Temp Resp Height(growth percentile) Weight(growth percentile) 119/73 (BP 1 Location: Right arm, BP Patient Position: Sitting) 83 97.9 °F (36.6 °C) (Oral) 16 5' 7\" (1.702 m) 174 lb (78.9 kg) LMP SpO2 BMI OB Status Smoking Status 2018 97% 27.25 kg/m2 Having regular periods Never Smoker Vitals History BMI and BSA Data Body Mass Index Body Surface Area  
 27.25 kg/m 2 1.93 m 2 Preferred Pharmacy [facility] Phone [id number] [address] Marleen Gibson [phone] Your Updated Medication List  
  
   
 This list is accurate as of 3/6/18  9:32 AM.  Always use your most recent med list.  
  
  
  
  
 ALPRAZolam 0.5 mg tablet Commonly known as:  Debora Budge Take 1 Tab by mouth three (3) times daily as needed for Anxiety. Max Daily Amount: 1.5 mg.  
  
 amoxicillin-clavulanate 875-125 mg per tablet Commonly known as:  AUGMENTIN Take 1 Tab by mouth two (2) times a day for 10 days. benzonatate 200 mg capsule Commonly known as:  TESSALON Take 1 Cap by mouth three (3) times daily as needed for Cough for up to 7 days. predniSONE 20 mg tablet Commonly known as:  Mikayla Blew Take 1 Tab by mouth daily (with breakfast) for 3 days. venlafaxine-SR 37.5 mg capsule Commonly known as:  EFFEXOR-XR Take 1 Cap by mouth daily. Indications: major depressive disorder Prescriptions Sent to Pharmacy Refills  
 amoxicillin-clavulanate (AUGMENTIN) 875-125 mg per tablet 0 Sig: Take 1 Tab by mouth two (2) times a day for 10 days. Class: Normal  
 Pharmacy: [id number] [address] Ph #: [phone] Route: Oral  
 predniSONE (DELTASONE) 20 mg tablet 0 Sig: Take 1 Tab by mouth daily (with breakfast) for 3 days. Class: Normal  
 Pharmacy: [id number] [address] Ph #: [phone] Route: Oral  
 benzonatate (TESSALON) 200 mg capsule 0 Sig: Take 1 Cap by mouth three (3) times daily as needed for Cough for up to 7 days. Class: Normal  
 Pharmacy: [id number] Ph #: [phone] Route: Oral  
  
Follow-up Instructions Return if symptoms worsen or fail to improve. Introducing [facility]! Oscar Mcmillan introduces Misoca patient portal. Now you can access parts of your medical record, email your doctor's office, and request medication refills online.    
 
1. In your internet browser, go to [email or web address] 2. Click on the First Time User? Click Here link in the Sign In box. You will see the New Member Sign Up page. 3. Enter your Improveit! 360 Access Code exactly as it appears below. You will not need to use this code after youve completed the sign-up process. If you do not sign up before the expiration date, you must request a new code. · Improveit! 360 Access Code: [email or web address] Expires: 5/17/2018 11:18 AM 
 
4. Enter the last four digits of your Social Security Number (xxxx) and Date of Birth (mm/dd/yyyy) as indicated and click Submit. You will be taken to the next sign-up page. 5. Create a Sport Universal Processt ID. This will be your Improveit! 360 login ID and cannot be changed, so think of one that is secure and easy to remember. 6. Create a Improveit! 360 password. You can change your password at any time. 7. Enter your Password Reset Question and Answer. This can be used at a later time if you forget your password. 8. Enter your e-mail address. You will receive e-mail notification when new information is available in [address] 9. Click Sign Up. You can now view and download portions of your medical record. 10. Click the Download Summary menu link to download a portable copy of your medical information. If you have questions, please visit the Frequently Asked Questions section of the Improveit! 360 website. Remember, Improveit! 360 is NOT to be used for urgent needs. For medical emergencies, dial 911. Now available from your iPhone and Android! Please provide this summary of care documentation to your next provider. Your primary care clinician is listed as Sumaya Johns. If you have any questions after today's visit, please call [phone].

## 2018-03-06 NOTE — PROGRESS NOTES
Chief Complaint   Patient presents with    Sore Throat    Nasal Congestion    Ear Fullness    Decreased Appetite    Cough     Patient states productive cough with green mucus.   Children also sick - 1)bronchitis - [age] year

## 2018-03-06 NOTE — PROGRESS NOTES
HISTORY OF PRESENT ILLNESS  Winsome Fajardo is a 29 y.o. female. HPI  Pt with PMHx of thrombocytopenia and depression here for an acute visit with CC of cough/congestion/fever. Two-year old was diagnosed with bronchitis and ear infection about 2 weeks ago, but was put on antibiotics and doing better. Daughter is also snotty and on antibiotics. Has sore throat, cough, pressure in her ears, productive cough. No fevers lately, but had 100.5F last week. Symptoms have been ongoing for 2 weeks. No hx of underlying lung disease. No wheezing or shortness of breath. Has tried Dayquil/Nyquil, Vonda-[place], tylenol, lozenges, which haven't really helped much. Visit Vitals    /73 (BP 1 Location: Right arm, BP Patient Position: Sitting)    Pulse 83    Temp 97.9 °F (36.6 °C) (Oral)    Resp 16    Ht 5' 7\" (1.702 m)    Wt 174 lb (78.9 kg)    LMP 02/14/2018    SpO2 97%    BMI 27.25 kg/m2     Current Outpatient Prescriptions on File Prior to Visit   Medication Sig Dispense Refill    venlafaxine-SR (EFFEXOR-XR) 37.5 mg capsule Take 1 Cap by mouth daily. Indications: major depressive disorder 30 Cap 1    ALPRAZolam (XANAX) 0.5 mg tablet Take 1 Tab by mouth three (3) times daily as needed for Anxiety. Max Daily Amount: 1.5 mg. 12 Tab 0     No current facility-administered medications on file prior to visit. Review of Systems   Constitutional: Positive for fever. Negative for chills and malaise/fatigue. HENT: Positive for congestion, sinus pain and sore throat. Negative for ear discharge, ear pain and tinnitus. Eyes: Negative for blurred vision, double vision, pain and discharge. Respiratory: Positive for cough and sputum production. Negative for shortness of breath and wheezing. Cardiovascular: Negative for chest pain and palpitations. Gastrointestinal: Negative for nausea. Neurological: Negative for weakness and headaches.        Physical Exam   Constitutional: She is oriented to person, place, and time. She appears well-developed and well-nourished. No distress. HENT:   Head: Normocephalic and atraumatic. Right Ear: External ear normal. A middle ear effusion is present. Left Ear: External ear normal. A middle ear effusion is present. Nose: Mucosal edema and rhinorrhea present. Right sinus exhibits frontal sinus tenderness. Right sinus exhibits no maxillary sinus tenderness. Left sinus exhibits frontal sinus tenderness. Left sinus exhibits no maxillary sinus tenderness. Mouth/Throat: Uvula is midline and mucous membranes are normal. Posterior oropharyngeal erythema present. No oropharyngeal exudate or posterior oropharyngeal edema. Eyes: Conjunctivae and EOM are normal. Pupils are equal, round, and reactive to light. Right eye exhibits no discharge. Left eye exhibits no discharge. Neck: Neck supple. Cardiovascular: Normal rate, regular rhythm and normal heart sounds. Pulmonary/Chest: Effort normal and breath sounds normal. No respiratory distress. She has no wheezes. She has no rales. Lymphadenopathy:     She has no cervical adenopathy. Neurological: She is alert and oriented to person, place, and time. Skin: Skin is warm and dry. She is not diaphoretic. Psychiatric: She has a normal mood and affect. Her behavior is normal. Judgment normal.   Nursing note and vitals reviewed. ASSESSMENT and PLAN    ICD-10-CM ICD-9-CM    1. Acute sinusitis, recurrence not specified, unspecified location J01.90 461.9 amoxicillin-clavulanate (AUGMENTIN) 875-125 mg per tablet      predniSONE (DELTASONE) 20 mg tablet   2. Cough R05 786.2 benzonatate (TESSALON) 200 mg capsule     With duration of symptoms > 10 days, will treat with augmentin BID x 10 days, prednisone burst x 3 days, and tessalon for cough. Continue supportive care. Follow up next week as scheduled for routine care. Follow-up Disposition:  Return if symptoms worsen or fail to improve.

## 2018-03-13 ENCOUNTER — OFFICE VISIT (OUTPATIENT)
Dept: FAMILY MEDICINE CLINIC | Age: 33
End: 2018-03-13

## 2018-03-13 VITALS
OXYGEN SATURATION: 99 % | WEIGHT: 174 LBS | RESPIRATION RATE: 16 BRPM | HEART RATE: 74 BPM | DIASTOLIC BLOOD PRESSURE: 75 MMHG | TEMPERATURE: 97.3 F | BODY MASS INDEX: 27.31 KG/M2 | HEIGHT: 67 IN | SYSTOLIC BLOOD PRESSURE: 123 MMHG

## 2018-03-13 DIAGNOSIS — F33.1 MODERATE EPISODE OF RECURRENT MAJOR DEPRESSIVE DISORDER (HCC): Primary | ICD-10-CM

## 2018-03-13 DIAGNOSIS — R09.81 NASAL CONGESTION: ICD-10-CM

## 2018-03-13 RX ORDER — VENLAFAXINE HYDROCHLORIDE 75 MG/1
75 CAPSULE, EXTENDED RELEASE ORAL DAILY
Qty: 30 CAP | Refills: 1 | Status: SHIPPED | OUTPATIENT
Start: 2018-03-13 | End: 2018-05-10

## 2018-03-13 RX ORDER — FLUTICASONE PROPIONATE 50 MCG
SPRAY, SUSPENSION (ML) NASAL
Qty: 1 BOTTLE | Refills: 2 | Status: SHIPPED | OUTPATIENT
Start: 2018-03-13 | End: 2018-03-22

## 2018-03-13 NOTE — PROGRESS NOTES
Chief Complaint   Patient presents with    Medication Management     Patient here for follow up on meds. No new concerns.

## 2018-03-13 NOTE — PROGRESS NOTES
HISTORY OF PRESENT ILLNESS  Bridget Muniz is a 29 y.o. female. HPI  Here for depressoin follow-up. Works as a nurse at [employer]. Depression/Anxiety  2/16/18: Feels like she is having severe post-partum depression/anxiety. Feels like she has no reason to be upset as she has a great life, but it has gotten worse over the past few weeks. Has dealt with it over her whole life, but has never been treated. Last night was especially rough; she has 2 young kids and her  works overnights, and she just laid on the floor crying. She went to the [place] years ago and got vistaril, which didn't help at all, but was on celexa and stopped it when she got pregnant. She thinks the celexa helped, though it wasn't that long that she was on it. Did counseling for a little while, but found excuses not to go. Wants to be 100% for her kids. Update 3/13/18: Was started on effexor 37.5mg once daily at her last visit one month ago, and given #12 xanax for as-needed anxiety. Has taken the xanax only 3x since her last visit about a month ago; took it when she was feeling overwhelmed and head was spinning. Does not think it has been that effective when she's taken the xanax; wondering about the dose. Thinks that the effexor is helping. Taking at night. Sleeping well. Feeling like she is less emotionally labile. Interested in increasing the dose. PHQ-9 score of 6 today; FERNANDO score of 5. Reports that her  thinks that she has become less emotional since starting the medication. Thrombocytopenia - Hx of this, got worse during pregnancy. Saw heme[facility]onc at [facility], but never got any follow-up set up there and isn't sure what the plan was. Scheduled with Dr. Thomas Peñaloza next week for new patient appt. [facility]. Hx HSV  Has been told that her bloodwork was positive for herpes, but has never had an outbreak. Current Specialists:  1.  [facility] [place] - OB/GYN; last visit summer 2017 (not planningto return)      Preventative Care  TDaP: May 2017  Pap smear: 2017, per patient report; was normal after history of abnormal pap smear  Mammogram: has never had  Colonoscopy: has never had  Denies early family hx of breast or colon CA. Healthy Habits  Patient is exercising 3 - 4x per week, running, and active with her kids. Patient endorses healthy diet; avoids fatty/greasy foods, sugar-sweetened beverages. Denies tobacco use. Social alcohol use. Denies illicit drug use. Sexually active with 1 male partner in last 12 months. Not using anything for birth control right now, though her  has had a vasectomy. Wondering if she should get back on the pill as her periods are irregular, heavy, and painful. Denies concerns for STIs today.      Past Medical History:   Diagnosis Date    Abnormal Papanicolaou smear of cervix     Anemia     Coagulation disorder (HCC)     thrombocytopenia with pregnancy's    Disease of blood and blood forming organ     thrombocytopenia    Fetal heart disease 2017     Past Surgical History:   Procedure Laterality Date     DELIVERY ONLY      HX  SECTION  2015    HX GYN  10/2015        HX HEENT      T&A     Family History   Problem Relation Age of Onset    No Known Problems Mother     No Known Problems Father      Social History     Social History    Marital status:      Spouse name: Julia Obrien Number of children: N/A    Years of education: N/A     Social History Main Topics    Smoking status: Never Smoker    Smokeless tobacco: Never Used    Alcohol use Yes      Comment: occasion    Drug use: No    Sexual activity: Yes     Partners: Male     Birth control/ protection: Pill     Other Topics Concern    None     Social History Narrative     Patient Active Problem List   Diagnosis Code    Temporary low platelet count (HCC) D69.6    Thrombocytopenia ([facility] D69.6    Previous  section complicating pregnancy N87.463    Dysmenorrhea N94.6    Moderate [facility] of [facility] F33.1     Outpatient Encounter Prescriptions as of 3/13/2018   Medication Sig Dispense Refill    venlafaxine-SR (EFFEXOR-XR) 75 mg capsule Take 1 Cap by mouth daily. 30 Cap 1    fluticasone (FLONASE) 50 mcg/actuation nasal spray 2 sprays in each nostril once daily as needed for allergies. 1 Bottle 2    amoxicillin-clavulanate (AUGMENTIN) 875-125 mg per tablet Take 1 Tab by mouth two (2) times a day for 10 days. 20 Tab 0    benzonatate (TESSALON) 200 mg capsule Take 1 Cap by mouth three (3) times daily as needed for Cough for up to 7 days. 21 Cap 0    ALPRAZolam (XANAX) 0.5 mg tablet Take 1 Tab by mouth three (3) times daily as needed for Anxiety. Max Daily Amount: 1.5 mg. 12 Tab 0    [DISCONTINUED] venlafaxine-SR (EFFEXOR-XR) 37.5 mg capsule Take 1 Cap by mouth daily. Indications: major depressive disorder 30 Cap 1     No facility-administered encounter medications on file as of 3/13/2018. Visit Vitals    /75 (BP 1 Location: Left arm, BP Patient Position: Sitting)    Pulse 74    Temp 97.3 °F (36.3 °C) (Oral)    Resp 16    Ht 5' 7\" (1.702 m)    Wt 174 lb (78.9 kg)    LMP 03/10/2018 (Exact Date)    SpO2 99%    BMI 27.25 kg/m2         Review of Systems   Constitutional: Negative for chills and fever. HENT: Positive for congestion. Negative for sore throat. Respiratory: Negative for cough. Psychiatric/Behavioral: Positive for depression. Negative for hallucinations, substance abuse and suicidal ideas. The patient is nervous/anxious. The patient does not have insomnia. Physical Exam   Constitutional: She is oriented to person, place, and time. She appears well-developed and well-nourished. No distress. HENT:   Head: Normocephalic and atraumatic. Nose: Mucosal edema and rhinorrhea present.    Pulmonary/Chest: Effort normal. No respiratory distress. Neurological: She is alert and oriented to person, place, and time. Skin: Skin is warm and dry. She is not diaphoretic. Psychiatric: She has a normal mood and affect. Her behavior is normal. Judgment normal.   Nursing note and vitals reviewed. ASSESSMENT and PLAN    ICD-10-CM ICD-9-CM    1. Moderate episode of recurrent major depressive disorder (HCC) F33.1 296.32 venlafaxine-SR (EFFEXOR-XR) 75 mg capsule   2. Nasal congestion R09.81 478.19 fluticasone (FLONASE) 50 mcg/actuation nasal spray     Significantly improved on effexor 37.5mg; will increase to 75mg once daily. Advised that she can take up to 2 of the xanax as needed for anxiety, but as she has only taken 3 in 1 month, hopefully this will not be needed especially with the increase in effexor. Declines referral to counseling today. Return in 4 - 6 weeks for med f/u. Plan for CPE with pap smear in June/July. Follow-up Disposition:  Return in about 4 weeks (around 4/10/2018) for med f/u.

## 2018-03-13 NOTE — MR AVS SNAPSHOT
[address] [facility] 
 
 
 [address] [facility] [address] [address] 
[phone] Patient: Joe Hart MRN: [id number] :1985 Visit Information Date & Time Provider Department Dept. Phone Encounter #  
 3/13/2018  3:30 PM Nandini Young NP [facility] [phone] [id number] Follow-up Instructions Return in about 4 weeks (around 4/10/2018) for med f/u. Your Appointments 3/19/2018  3:30 PM  
New Patient with Hillard Oppenheim, MD  
[address] Oncology at [facility]) Appt Note: new aziza thrombocytopenia; new aziza thrombocytopenia [address] [facility] [address] [id number]  
[address] [facility] [address] [facility] [address] Flaco Szymanski Upcoming Health Maintenance Date Due  
 PAP AKA CERVICAL CYTOLOGY 2008 DTaP/Tdap/Td series (2 - Td) 2027 Allergies as of 3/13/2018  Review Complete On: 3/13/2018 By: Nandini Young NP Severity Noted Reaction Type Reactions Morphine  10/09/2015    Hives Current Immunizations  Never Reviewed No immunizations on file. Not reviewed this visit You Were Diagnosed With   
  
 Codes Comments Moderate episode of recurrent major depressive disorder ([facility]    -  Primary ICD-10-CM: F33.1 ICD-9-CM: 296.32 Nasal congestion     ICD-10-CM: R09.81 ICD-9-CM: 478.19 Vitals BP Pulse Temp Resp Height(growth percentile) Weight(growth percentile) 123/75 (BP 1 Location: Left arm, BP Patient Position: Sitting) 74 97.3 °F (36.3 °C) (Oral) 16 5' 7\" (1.702 m) 174 lb (78.9 kg) LMP SpO2 BMI OB Status Smoking Status 03/10/2018 (Exact Date) 99% 27.25 kg/m2 Having regular periods Never Smoker BMI and BSA Data Body Mass Index Body Surface Area  
 27.25 kg/m 2 1.93 m 2 Preferred Pharmacy Pharmacy Name Phone [facility] [address] [address], [address] [place] [phone] Your Updated Medication List  
  
   
This list is accurate as of 3/13/18  3:56 PM.  Always use your most recent med list.  
  
  
  
  
 ALPRAZolam 0.5 mg tablet Commonly known as:  Dayla Bake Take 1 Tab by mouth three (3) times daily as needed for Anxiety. Max Daily Amount: 1.5 mg.  
  
 amoxicillin-clavulanate 875-125 mg per tablet Commonly known as:  AUGMENTIN Take 1 Tab by mouth two (2) times a day for 10 days. benzonatate 200 mg capsule Commonly known as:  TESSALON Take 1 Cap by mouth three (3) times daily as needed for Cough for up to 7 days. fluticasone 50 mcg/actuation nasal spray Commonly known as:  FLONASE  
2 sprays in each nostril once daily as needed for allergies. venlafaxine-SR 75 mg capsule Commonly known as:  EFFEXOR-XR Take 1 Cap by mouth daily. Prescriptions Sent to Pharmacy Refills  
 venlafaxine-SR (EFFEXOR-XR) 75 mg capsule 1 Sig: Take 1 Cap by mouth daily. Class: Normal  
 Pharmacy: [id number] [address], [address] [place] [address] E Ph #: [phone] Route: Oral  
 fluticasone (FLONASE) 50 mcg/actuation nasal spray 2 Si sprays in each nostril once daily as needed for allergies. Class: Normal  
 Pharmacy: [id number] [address], [address] [place] [address] E Ph #: [phone] Follow-up Instructions Return in about 4 weeks (around 4/10/2018) for med f/u. Introducing [facility]! Brigitte Ramachandran introduces [employer] patient portal. Now you can access parts of your medical record, email your doctor's office, and request medication refills online. 1. In your internet browser, go to [email or web address] [email or web address] 2. Click on the First Time User? Click Here link in the Sign In box. You will see the New Member Sign Up page. 3. Enter your [employer] Access Code exactly as it appears below. You will not need to use this code after youve completed the sign-up process. If you do not sign up before the expiration date, you must request a new code. · [employer] Access Code: [email or web address] Expires: 5/17/2018 12:18 PM 
 
4. Enter the last four digits of your Social Security Number (xxxx) and Date of Birth (mm/dd/yyyy) as indicated and click Submit. You will be taken to the next sign-up page. 5. Create a Kumo ID. This will be your [employer]o login ID and cannot be changed, so think of one that is secure and easy to remember. 6. Create a Kumo password. You can change your password at any time. 7. Enter your Password Reset Question and Answer. This can be used at a later time if you forget your password. 8. Enter your e-mail address. You will receive e-mail notification when new information is available in [address] 9. Click Sign Up. You can now view and download portions of your medical record. 10. Click the Download Summary menu link to download a portable copy of your medical information. If you have questions, please visit the Frequently Asked Questions section of the [employer]o website. Remember, [employer]o is NOT to be used for urgent needs. For medical emergencies, dial 911. Now available from your iPhone and Android! Please provide this summary of care documentation to your next provider. Your primary care clinician is listed as Memo Hernandez. If you have any questions after today's visit, please call [phone].

## 2018-03-19 ENCOUNTER — OFFICE VISIT (OUTPATIENT)
Dept: ONCOLOGY | Age: 33
End: 2018-03-19

## 2018-03-19 VITALS
DIASTOLIC BLOOD PRESSURE: 86 MMHG | RESPIRATION RATE: 16 BRPM | HEIGHT: 67 IN | OXYGEN SATURATION: 97 % | TEMPERATURE: 98.1 F | HEART RATE: 79 BPM | WEIGHT: 174.6 LBS | BODY MASS INDEX: 27.4 KG/M2 | SYSTOLIC BLOOD PRESSURE: 124 MMHG

## 2018-03-19 DIAGNOSIS — D69.6 THROMBOCYTOPENIA (HCC): Primary | ICD-10-CM

## 2018-03-19 DIAGNOSIS — R58 EXCESSIVE BLEEDING: ICD-10-CM

## 2018-03-19 RX ORDER — VENLAFAXINE HYDROCHLORIDE 37.5 MG/1
CAPSULE, EXTENDED RELEASE ORAL
Refills: 0 | COMMUNITY
Start: 2018-02-16 | End: 2018-03-22

## 2018-03-19 NOTE — PROGRESS NOTES
Carolina Esparza is a 29 y.o. female new patient referred by Dr. Noel Almonte to provider for Thrombocytopenia. 2017 Plt. 88. VS stable. Patient denies pain. Patient states she bruise a lot. Patient has a large fading yellowish bruise to her left knee. Visit Vitals    /86 (BP 1 Location: Left arm, BP Patient Position: Sitting)    Pulse 79    Temp 98.1 °F (36.7 °C) (Oral)    Resp 16    Ht 5' 7\" (1.702 m)    Wt 174 lb 9.6 oz (79.2 kg)    LMP 03/10/2018 (Exact Date)    SpO2 97%    BMI 27.35 kg/m2     Health Maintenance Review: Patient reminded of \"due or due soon\" health maintenance. I have asked the patient to contact his/her primary care provider (PCP) for follow-up on his/her health maintenance.

## 2018-03-20 LAB
BASOPHILS # BLD AUTO: 0.1 X10E3/UL (ref 0–0.2)
BASOPHILS NFR BLD AUTO: 0 %
EOSINOPHIL # BLD AUTO: 0.4 X10E3/UL (ref 0–0.4)
EOSINOPHIL NFR BLD AUTO: 4 %
ERYTHROCYTE [DISTWIDTH] IN BLOOD BY AUTOMATED COUNT: 13.6 % (ref 12.3–15.4)
FERRITIN SERPL-MCNC: 130 NG/ML (ref 15–150)
HCT VFR BLD AUTO: 40.9 % (ref 34–46.6)
HGB BLD-MCNC: 13.5 G/DL (ref 11.1–15.9)
IMM GRANULOCYTES # BLD: 0 X10E3/UL (ref 0–0.1)
IMM GRANULOCYTES NFR BLD: 0 %
IRON SATN MFR SERPL: 25 % (ref 15–55)
IRON SERPL-MCNC: 65 UG/DL (ref 27–159)
LYMPHOCYTES # BLD AUTO: 2.6 X10E3/UL (ref 0.7–3.1)
LYMPHOCYTES NFR BLD AUTO: 23 %
MCH RBC QN AUTO: 30.9 PG (ref 26.6–33)
MCHC RBC AUTO-ENTMCNC: 33 G/DL (ref 31.5–35.7)
MCV RBC AUTO: 94 FL (ref 79–97)
MONOCYTES # BLD AUTO: 0.5 X10E3/UL (ref 0.1–0.9)
MONOCYTES NFR BLD AUTO: 4 %
NEUTROPHILS # BLD AUTO: 7.6 X10E3/UL (ref 1.4–7)
NEUTROPHILS NFR BLD AUTO: 69 %
PLATELET # BLD AUTO: 134 X10E3/UL (ref 150–379)
RBC # BLD AUTO: 4.37 X10E6/UL (ref 3.77–5.28)
TIBC SERPL-MCNC: 256 UG/DL (ref 250–450)
UIBC SERPL-MCNC: 191 UG/DL (ref 131–425)
WBC # BLD AUTO: 11.2 X10E3/UL (ref 3.4–10.8)

## 2018-03-21 ENCOUNTER — TELEPHONE (OUTPATIENT)
Dept: FAMILY MEDICINE CLINIC | Age: 33
End: 2018-03-21

## 2018-03-21 NOTE — PROGRESS NOTES
Hematology Consultation        Patient: García Chapman MRN: [id number]  SSN: [id number]    YOB: 1985  Age: 29 y.o. Sex: female        Subjective:      García Chapman is a 29 y.o. female who I am seeing in consultation for thrombocytopenia and excessive bruising. She has a history of mild to moderate thrombocytopenia for a number of years. This was recognized at least 3-4 years ago when she was pregnant. She says that she received steroids with her earlier pregnancy and that helped improve the platelet count. In a subsequent pregnancy last year she had to undergo a general anesthesia for a  section due to thrombocytopenia. She had a single dose of steroids which did not help the platelet count. She has seen [facility] provider @ [employer] office but did not get a satisfactory answer. She mentioned isolated bruising for several years. She also mentioned gum bleeding with brushing teeth. Denies nose bleeds, rectal bleeding. She has menorrhagia but not currently on OCPs. She did not suffer excessive bleeding with  section in the past. She denies joint bleeding. She does not take an ASA. She started taking Effexor for mood disorder.        Review of Systems:    Constitutional: negative  Eyes: negative  Ears, Nose, Mouth, Throat, and Face: negative  Respiratory: negative  Cardiovascular: negative  Gastrointestinal: negative  Genitourinary:negative  Integument/Breast: negative  Hematologic/Lymphatic: negative  Musculoskeletal:negative  Neurological: negative      Past Medical History:   Diagnosis Date    Abnormal Papanicolaou smear of cervix     Anemia     Coagulation disorder (HCC)     thrombocytopenia with pregnancy's    Disease of blood and blood forming organ     thrombocytopenia    Fetal heart disease 2017     Past Surgical History:   Procedure Laterality Date     DELIVERY ONLY      HX  SECTION  2015    HX GYN  10/2015        HX HEENT 2010    T&A      Family History   Problem Relation Age of Onset    No Known Problems Mother     No Known Problems Father      Social History   Substance Use Topics    Smoking status: Never Smoker    Smokeless tobacco: Never Used    Alcohol use Yes      Comment: occasion      Prior to Admission medications    Medication Sig Start Date End Date Taking? Authorizing Provider   venlafaxine-SR [facility] 75 mg capsule Take 1 Cap by mouth daily. 3/13/18  Yes Lee Salas NP   venlafaxine-SR [facility] 37.5 mg capsule take 1 capsule by mouth once daily 2/16/18   Historical Provider   fluticasone (FLONASE) 50 mcg/actuation nasal spray 2 sprays in each nostril once daily as needed for allergies. 3/13/18   Lee Salas NP   ALPRAZolam [place]) 0.5 mg tablet Take 1 Tab by mouth three (3) times daily as needed for Anxiety. Max Daily Amount: 1.5 mg. 2/16/18   Lee Salas NP              Allergies   Allergen Reactions    Morphine Hives           Objective:     Vitals:    03/19/18 1526   BP: 124/86   Pulse: 79   Resp: 16   Temp: 98.1 °F (36.7 °C)   TempSrc: Oral   SpO2: 97%   Weight: 174 lb 9.6 oz (79.2 kg)   Height: 5' 7\" (1.702 m)            Physical Exam:    GENERAL: alert, cooperative, no distress, appears stated age  EYE: negative  LYMPHATIC: Cervical, supraclavicular, and axillary nodes normal.   THROAT & NECK: normal and no erythema or exudates noted. LUNG: clear to auscultation bilaterally  HEART: regular rate and rhythm  ABDOMEN: soft, non-tender  EXTREMITIES:  no edema  SKIN: Normal.  NEUROLOGIC: negative           Lab Results   Component Value Date/Time    WBC 11.2 (H) 03/19/2018 04:05 PM    HGB 13.5 03/19/2018 04:05 PM    HCT 40.9 03/19/2018 04:05 PM    PLATELET 932 (L) 01/03/4772 04:05 PM    MCV 94 03/19/2018 04:05 PM             Assessment:     1.  Moderate thrombocytopenia:    The patient's hemoglobin, white blood cell and differential blood counts are normal.   Intermittent thrombocytopenia could be related to ITP. Since the platelet count is normal now, no intervention is necessary. 2. Excessive bruising    Bleeding history is not particularly strong. No family history of bleeding. Coagulation profile obtained in Nov 2017 shows low platelet, normal Factor VIII level, normal von Willebrand profile and altered platelet aggregation studies. I suspect the abnormal platelet aggregation study could be related to low platelet count at that time. I shall see her back in 3 months and re-evaluate the need to pursue further investigation. Plan:       > Observation  > Repeat CBC with differential.  > Follow up in 3 months with CBC with differential.        Signed by: Leonardo Goodwin MD                     March 21, 2018         CC.  Nasrin Rob NP

## 2018-03-21 NOTE — TELEPHONE ENCOUNTER
Patient called stating that she would like a call back regarding her hematology appointment. Patient can be reached at [phone].

## 2018-03-21 NOTE — PROGRESS NOTES
Call placed to patient. HIPPA verified. Patient made aware of her lab results per provider message. Patient asked for Plt count; pt advised plt 134. Patient verbalized understanding and stated she will call back to make her 3 month f/u.

## 2018-03-21 NOTE — TELEPHONE ENCOUNTER
Patient states she was seen by Dr. Heidy William on Monday and got clearance to start birth control. Patient states platelets were 173. Patient notified that pcp is not in office today but will get message upon return tomorrow and request will be forwarded to pcp. Patient scheduled next office visit for 4/9/18.   Will notify patient upon advisement from pcp

## 2018-03-22 ENCOUNTER — HOSPITAL ENCOUNTER (INPATIENT)
Age: 33
LOS: 3 days | Discharge: HOME | DRG: 872 | End: 2018-03-25
Attending: EMERGENCY MEDICINE | Admitting: INTERNAL MEDICINE
Payer: COMMERCIAL

## 2018-03-22 ENCOUNTER — APPOINTMENT (OUTPATIENT)
Dept: CT IMAGING | Age: 33
DRG: 872 | End: 2018-03-22
Attending: EMERGENCY MEDICINE
Payer: COMMERCIAL

## 2018-03-22 DIAGNOSIS — N10 ACUTE PYELONEPHRITIS: Primary | ICD-10-CM

## 2018-03-22 DIAGNOSIS — N94.6 DYSMENORRHEA: Primary | ICD-10-CM

## 2018-03-22 DIAGNOSIS — L73.8 HOT TUB FOLLICULITIS: ICD-10-CM

## 2018-03-22 DIAGNOSIS — N20.0 RENAL STONES: ICD-10-CM

## 2018-03-22 DIAGNOSIS — R10.9 ACUTE RIGHT FLANK PAIN: ICD-10-CM

## 2018-03-22 DIAGNOSIS — A41.9 SEPSIS, DUE TO UNSPECIFIED ORGANISM: ICD-10-CM

## 2018-03-22 LAB
ALBUMIN SERPL-MCNC: 3.6 G/DL (ref 3.5–5)
ALBUMIN/GLOB SERPL: 1 {RATIO} (ref 1.1–2.2)
ALP SERPL-CCNC: 68 U/L (ref 45–117)
ALT SERPL-CCNC: 15 U/L (ref 12–78)
ANION GAP SERPL CALC-SCNC: 5 MMOL/L (ref 5–15)
APPEARANCE UR: ABNORMAL
AST SERPL-CCNC: 13 U/L (ref 15–37)
BACTERIA URNS QL MICRO: ABNORMAL /HPF
BASOPHILS # BLD: 0 K/UL (ref 0–0.1)
BASOPHILS NFR BLD: 0 % (ref 0–1)
BILIRUB SERPL-MCNC: 1.6 MG/DL (ref 0.2–1)
BILIRUB UR QL: NEGATIVE
BUN SERPL-MCNC: 9 MG/DL (ref 6–20)
BUN/CREAT SERPL: 13 (ref 12–20)
CALCIUM SERPL-MCNC: 8.5 MG/DL (ref 8.5–10.1)
CHLORIDE SERPL-SCNC: 106 MMOL/L (ref 97–108)
CO2 SERPL-SCNC: 27 MMOL/L (ref 21–32)
COLOR UR: ABNORMAL
CREAT SERPL-MCNC: 0.68 MG/DL (ref 0.55–1.02)
DIFFERENTIAL METHOD BLD: ABNORMAL
EOSINOPHIL # BLD: 0.1 K/UL (ref 0–0.4)
EOSINOPHIL NFR BLD: 1 % (ref 0–7)
EPITH CASTS URNS QL MICRO: ABNORMAL /LPF
ERYTHROCYTE [DISTWIDTH] IN BLOOD BY AUTOMATED COUNT: 13 % (ref 11.5–14.5)
GLOBULIN SER CALC-MCNC: 3.7 G/DL (ref 2–4)
GLUCOSE SERPL-MCNC: 79 MG/DL (ref 65–100)
GLUCOSE UR STRIP.AUTO-MCNC: NEGATIVE MG/DL
HCG SERPL-ACNC: <1 MIU/ML (ref 0–6)
HCT VFR BLD AUTO: 37.4 % (ref 35–47)
HGB BLD-MCNC: 12.5 G/DL (ref 11.5–16)
HGB UR QL STRIP: ABNORMAL
HYALINE CASTS URNS QL MICRO: ABNORMAL /LPF (ref 0–5)
IMM GRANULOCYTES # BLD: 0.1 K/UL (ref 0–0.04)
IMM GRANULOCYTES NFR BLD AUTO: 0 % (ref 0–0.5)
KETONES UR QL STRIP.AUTO: 15 MG/DL
LACTATE SERPL-SCNC: 1.9 MMOL/L (ref 0.4–2)
LEUKOCYTE ESTERASE UR QL STRIP.AUTO: ABNORMAL
LYMPHOCYTES # BLD: 1.4 K/UL (ref 0.8–3.5)
LYMPHOCYTES NFR BLD: 9 % (ref 12–49)
MCH RBC QN AUTO: 31.3 PG (ref 26–34)
MCHC RBC AUTO-ENTMCNC: 33.4 G/DL (ref 30–36.5)
MCV RBC AUTO: 93.5 FL (ref 80–99)
MONOCYTES # BLD: 1.1 K/UL (ref 0–1)
MONOCYTES NFR BLD: 7 % (ref 5–13)
NEUTS SEG # BLD: 12.8 K/UL (ref 1.8–8)
NEUTS SEG NFR BLD: 83 % (ref 32–75)
NITRITE UR QL STRIP.AUTO: NEGATIVE
NRBC # BLD: 0 K/UL (ref 0–0.01)
NRBC BLD-RTO: 0 PER 100 WBC
PH UR STRIP: 8 [PH] (ref 5–8)
PLATELET # BLD AUTO: 95 K/UL (ref 150–400)
PMV BLD AUTO: 10.9 FL (ref 8.9–12.9)
POTASSIUM SERPL-SCNC: 3.9 MMOL/L (ref 3.5–5.1)
PROT SERPL-MCNC: 7.3 G/DL (ref 6.4–8.2)
PROT UR STRIP-MCNC: 100 MG/DL
RBC # BLD AUTO: 4 M/UL (ref 3.8–5.2)
RBC #/AREA URNS HPF: ABNORMAL /HPF (ref 0–5)
SODIUM SERPL-SCNC: 138 MMOL/L (ref 136–145)
SP GR UR REFRACTOMETRY: 1.02 (ref 1–1.03)
UA: UC IF INDICATED,UAUC: ABNORMAL
UROBILINOGEN UR QL STRIP.AUTO: 1 EU/DL (ref 0.2–1)
WBC # BLD AUTO: 15.4 K/UL (ref 3.6–11)
WBC URNS QL MICRO: >100 /HPF (ref 0–4)

## 2018-03-22 PROCEDURE — 99283 EMERGENCY DEPT VISIT LOW MDM: CPT

## 2018-03-22 PROCEDURE — 83605 ASSAY OF LACTIC ACID: CPT | Performed by: EMERGENCY MEDICINE

## 2018-03-22 PROCEDURE — 87186 SC STD MICRODIL/AGAR DIL: CPT | Performed by: EMERGENCY MEDICINE

## 2018-03-22 PROCEDURE — 85025 COMPLETE CBC W/AUTO DIFF WBC: CPT | Performed by: EMERGENCY MEDICINE

## 2018-03-22 PROCEDURE — 87086 URINE CULTURE/COLONY COUNT: CPT | Performed by: EMERGENCY MEDICINE

## 2018-03-22 PROCEDURE — 74011250637 HC RX REV CODE- 250/637: Performed by: INTERNAL MEDICINE

## 2018-03-22 PROCEDURE — 87077 CULTURE AEROBIC IDENTIFY: CPT | Performed by: EMERGENCY MEDICINE

## 2018-03-22 PROCEDURE — 87040 BLOOD CULTURE FOR BACTERIA: CPT | Performed by: EMERGENCY MEDICINE

## 2018-03-22 PROCEDURE — 36415 COLL VENOUS BLD VENIPUNCTURE: CPT | Performed by: EMERGENCY MEDICINE

## 2018-03-22 PROCEDURE — 74011250636 HC RX REV CODE- 250/636: Performed by: EMERGENCY MEDICINE

## 2018-03-22 PROCEDURE — 80053 COMPREHEN METABOLIC PANEL: CPT | Performed by: EMERGENCY MEDICINE

## 2018-03-22 PROCEDURE — 74177 CT ABD & PELVIS W/CONTRAST: CPT

## 2018-03-22 PROCEDURE — 81001 URINALYSIS AUTO W/SCOPE: CPT | Performed by: EMERGENCY MEDICINE

## 2018-03-22 PROCEDURE — 84702 CHORIONIC GONADOTROPIN TEST: CPT | Performed by: EMERGENCY MEDICINE

## 2018-03-22 PROCEDURE — 96361 HYDRATE IV INFUSION ADD-ON: CPT

## 2018-03-22 PROCEDURE — 65270000029 HC RM PRIVATE

## 2018-03-22 PROCEDURE — 96365 THER/PROPH/DIAG IV INF INIT: CPT

## 2018-03-22 PROCEDURE — 74011250636 HC RX REV CODE- 250/636: Performed by: INTERNAL MEDICINE

## 2018-03-22 PROCEDURE — 74011636320 HC RX REV CODE- 636/320: Performed by: EMERGENCY MEDICINE

## 2018-03-22 PROCEDURE — 74011250636 HC RX REV CODE- 250/636

## 2018-03-22 PROCEDURE — 74011000258 HC RX REV CODE- 258: Performed by: EMERGENCY MEDICINE

## 2018-03-22 PROCEDURE — 96375 TX/PRO/DX INJ NEW DRUG ADDON: CPT

## 2018-03-22 RX ORDER — ONDANSETRON 2 MG/ML
4 INJECTION INTRAMUSCULAR; INTRAVENOUS
Status: COMPLETED | OUTPATIENT
Start: 2018-03-22 | End: 2018-03-22

## 2018-03-22 RX ORDER — FENTANYL CITRATE 50 UG/ML
75 INJECTION, SOLUTION INTRAMUSCULAR; INTRAVENOUS
Status: DISCONTINUED | OUTPATIENT
Start: 2018-03-22 | End: 2018-03-24

## 2018-03-22 RX ORDER — SODIUM CHLORIDE 0.9 % (FLUSH) 0.9 %
10 SYRINGE (ML) INJECTION
Status: COMPLETED | OUTPATIENT
Start: 2018-03-22 | End: 2018-03-22

## 2018-03-22 RX ORDER — HYDROMORPHONE HYDROCHLORIDE 2 MG/ML
INJECTION, SOLUTION INTRAMUSCULAR; INTRAVENOUS; SUBCUTANEOUS
Status: DISPENSED
Start: 2018-03-22 | End: 2018-03-23

## 2018-03-22 RX ORDER — ENOXAPARIN SODIUM 100 MG/ML
40 INJECTION SUBCUTANEOUS
Status: DISCONTINUED | OUTPATIENT
Start: 2018-03-22 | End: 2018-03-25 | Stop reason: HOSPADM

## 2018-03-22 RX ORDER — FENTANYL CITRATE 50 UG/ML
50 INJECTION, SOLUTION INTRAMUSCULAR; INTRAVENOUS ONCE
Status: COMPLETED | OUTPATIENT
Start: 2018-03-22 | End: 2018-03-22

## 2018-03-22 RX ORDER — ACETAMINOPHEN 325 MG/1
650 TABLET ORAL
Status: DISCONTINUED | OUTPATIENT
Start: 2018-03-22 | End: 2018-03-25 | Stop reason: HOSPADM

## 2018-03-22 RX ORDER — SODIUM CHLORIDE 9 MG/ML
50 INJECTION, SOLUTION INTRAVENOUS
Status: COMPLETED | OUTPATIENT
Start: 2018-03-22 | End: 2018-03-22

## 2018-03-22 RX ORDER — ACETAMINOPHEN 500 MG
1000 TABLET ORAL
COMMUNITY
End: 2019-01-07

## 2018-03-22 RX ORDER — OXYCODONE AND ACETAMINOPHEN 5; 325 MG/1; MG/1
1 TABLET ORAL
Status: DISCONTINUED | OUTPATIENT
Start: 2018-03-22 | End: 2018-03-24

## 2018-03-22 RX ORDER — LEVOFLOXACIN 5 MG/ML
750 INJECTION, SOLUTION INTRAVENOUS
Status: COMPLETED | OUTPATIENT
Start: 2018-03-22 | End: 2018-03-22

## 2018-03-22 RX ORDER — NALOXONE HYDROCHLORIDE 0.4 MG/ML
0.4 INJECTION, SOLUTION INTRAMUSCULAR; INTRAVENOUS; SUBCUTANEOUS AS NEEDED
Status: DISCONTINUED | OUTPATIENT
Start: 2018-03-22 | End: 2018-03-25 | Stop reason: HOSPADM

## 2018-03-22 RX ORDER — HYDROMORPHONE HYDROCHLORIDE 2 MG/ML
0.5 INJECTION, SOLUTION INTRAMUSCULAR; INTRAVENOUS; SUBCUTANEOUS ONCE
Status: COMPLETED | OUTPATIENT
Start: 2018-03-22 | End: 2018-03-22

## 2018-03-22 RX ORDER — KETOROLAC TROMETHAMINE 30 MG/ML
15 INJECTION, SOLUTION INTRAMUSCULAR; INTRAVENOUS ONCE
Status: COMPLETED | OUTPATIENT
Start: 2018-03-22 | End: 2018-03-22

## 2018-03-22 RX ORDER — SODIUM CHLORIDE 0.9 % (FLUSH) 0.9 %
5-10 SYRINGE (ML) INJECTION AS NEEDED
Status: DISCONTINUED | OUTPATIENT
Start: 2018-03-22 | End: 2018-03-25 | Stop reason: HOSPADM

## 2018-03-22 RX ORDER — HYDROMORPHONE HYDROCHLORIDE 1 MG/ML
0.5 INJECTION, SOLUTION INTRAMUSCULAR; INTRAVENOUS; SUBCUTANEOUS
Status: DISCONTINUED | OUTPATIENT
Start: 2018-03-22 | End: 2018-03-22

## 2018-03-22 RX ORDER — KETOROLAC TROMETHAMINE 30 MG/ML
15 INJECTION, SOLUTION INTRAMUSCULAR; INTRAVENOUS EVERY 6 HOURS
Status: DISCONTINUED | OUTPATIENT
Start: 2018-03-22 | End: 2018-03-25 | Stop reason: HOSPADM

## 2018-03-22 RX ORDER — ONDANSETRON 2 MG/ML
4 INJECTION INTRAMUSCULAR; INTRAVENOUS
Status: DISCONTINUED | OUTPATIENT
Start: 2018-03-22 | End: 2018-03-25 | Stop reason: HOSPADM

## 2018-03-22 RX ORDER — SODIUM CHLORIDE 9 MG/ML
50 INJECTION, SOLUTION INTRAVENOUS CONTINUOUS
Status: DISCONTINUED | OUTPATIENT
Start: 2018-03-22 | End: 2018-03-25

## 2018-03-22 RX ORDER — LEVOFLOXACIN 5 MG/ML
750 INJECTION, SOLUTION INTRAVENOUS EVERY 24 HOURS
Status: DISCONTINUED | OUTPATIENT
Start: 2018-03-23 | End: 2018-03-25

## 2018-03-22 RX ADMIN — ACETAMINOPHEN 650 MG: 325 TABLET ORAL at 15:34

## 2018-03-22 RX ADMIN — FENTANYL CITRATE 50 MCG: 50 INJECTION, SOLUTION INTRAMUSCULAR; INTRAVENOUS at 11:07

## 2018-03-22 RX ADMIN — IOPAMIDOL 100 ML: 755 INJECTION, SOLUTION INTRAVENOUS at 12:56

## 2018-03-22 RX ADMIN — SODIUM CHLORIDE 1000 ML: 900 INJECTION, SOLUTION INTRAVENOUS at 13:30

## 2018-03-22 RX ADMIN — ONDANSETRON 4 MG: 2 INJECTION INTRAMUSCULAR; INTRAVENOUS at 20:59

## 2018-03-22 RX ADMIN — SODIUM CHLORIDE 50 ML/HR: 900 INJECTION, SOLUTION INTRAVENOUS at 12:56

## 2018-03-22 RX ADMIN — KETOROLAC TROMETHAMINE 15 MG: 30 INJECTION, SOLUTION INTRAMUSCULAR at 11:06

## 2018-03-22 RX ADMIN — LEVOFLOXACIN 750 MG: 5 INJECTION, SOLUTION INTRAVENOUS at 15:14

## 2018-03-22 RX ADMIN — SODIUM CHLORIDE 125 ML/HR: 900 INJECTION, SOLUTION INTRAVENOUS at 16:46

## 2018-03-22 RX ADMIN — ONDANSETRON 4 MG: 2 INJECTION INTRAMUSCULAR; INTRAVENOUS at 11:06

## 2018-03-22 RX ADMIN — OXYCODONE HYDROCHLORIDE AND ACETAMINOPHEN 1 TABLET: 5; 325 TABLET ORAL at 19:32

## 2018-03-22 RX ADMIN — OXYCODONE HYDROCHLORIDE AND ACETAMINOPHEN 1 TABLET: 5; 325 TABLET ORAL at 15:34

## 2018-03-22 RX ADMIN — SODIUM CHLORIDE 1000 ML: 900 INJECTION, SOLUTION INTRAVENOUS at 11:07

## 2018-03-22 RX ADMIN — FENTANYL CITRATE 75 MCG: 50 INJECTION, SOLUTION INTRAMUSCULAR; INTRAVENOUS at 20:56

## 2018-03-22 RX ADMIN — Medication 10 ML: at 12:56

## 2018-03-22 RX ADMIN — CEFTRIAXONE 1 G: 1 INJECTION, POWDER, FOR SOLUTION INTRAMUSCULAR; INTRAVENOUS at 12:34

## 2018-03-22 RX ADMIN — HYDROMORPHONE HYDROCHLORIDE 0.5 MG: 2 INJECTION, SOLUTION INTRAMUSCULAR; INTRAVENOUS; SUBCUTANEOUS at 13:11

## 2018-03-22 NOTE — IP AVS SNAPSHOT
[address] [facility] 
[phone] Patient: Diane Has MRN: [id number] :1985 A check deloris indicates which time of day the medication should be taken. My Medications START taking these medications Instructions Each Dose to Equal  
 Morning Noon Evening Bedtime L. acidoph & paracasei- S therm- Bifido 8 billion cell Cap cap Commonly known as:  BRENDAN-Q/RISAQUAD Start taking on:  3/26/2018 Your last dose was: Your next dose is: Take 1 Cap by mouth daily for 14 days. 1 Cap  
    
   
   
   
  
 levoFLOXacin 500 mg tablet Commonly known as:  Flolinette Silvius Your last dose was: Your next dose is: Take 1 Tab by mouth every twenty-four (24) hours for 6 days. 500 mg  
    
   
   
   
  
 oxyCODONE-acetaminophen 5-325 mg per tablet Commonly known as:  PERCOCET Your last dose was: Your next dose is: Take 2 Tabs by mouth every six (6) hours as needed for up to 12 doses. Max Daily Amount: 8 Tabs. 2 Tab CONTINUE taking these medications Instructions Each Dose to Equal  
 Morning Noon Evening Bedtime TYLENOL EXTRA STRENGTH 500 mg tablet Generic drug:  acetaminophen Your last dose was: Your next dose is: Take 1,000 mg by mouth every six (6) hours as needed for Pain. 1000 mg  
    
   
   
   
  
 venlafaxine-SR 75 mg capsule Commonly known as:  EFFEXOR-XR Your last dose was: Your next dose is: Take 1 Cap by mouth daily. 75 mg Where to Get Your Medications Information on where to get these meds will be given to you by the nurse or doctor. ! Ask your nurse or doctor about these medications L. acidoph & paracasei- S therm- Bifido 8 billion cell Cap cap  
 levoFLOXacin 500 mg tablet oxyCODONE-acetaminophen 5-325 mg per tablet

## 2018-03-22 NOTE — ED NOTES
Called to give report to patient. Nurse stated it's change of shift and will call back to extension [phone] for report.

## 2018-03-22 NOTE — TELEPHONE ENCOUNTER
Spoke with patient regarding lmp 3/10/18. Patient at ER with negative urine pregnancy test.  Pcp asked that I let patient know we are sending her prescription to pharmacy on file.

## 2018-03-22 NOTE — PROGRESS NOTES
Pharmacy Clarification of Prior to Admission Medication Regimen     The patient was interviewed regarding clarification of the prior to admission medication regimen. Patient's  was present in room and obtained permission from patient to discuss drug regimen with visitor(s) present. Patient was questioned regarding use of any other inhalers, topical products, over the counter medications, herbal medications, vitamin products or ophthalmic/nasal/otic medication use. Information Obtained From: Rx Query and patient    Pertinent Pharmacy Findings: None    PTA medication list was corrected to the following:     Prior to Admission Medications   Prescriptions Last Dose Informant Patient Reported? Taking?   acetaminophen (TYLENOL EXTRA STRENGTH) 500 mg tablet 3/21/2018 at Unknown time Self Yes Yes   Sig: Take 1,000 mg by mouth every six (6) hours as needed for Pain. venlafaxine-SR (EFFEXOR-XR) 75 mg capsule 3/21/2018 at Unknown time Self No Yes   Sig: Take 1 Cap by mouth daily.       Facility-Administered Medications: None          Thank you,  Farrell Given, CPhT  Medication History Pharmacy Technician

## 2018-03-22 NOTE — PROGRESS NOTES
TRANSFER - IN REPORT:    Verbal report received from (name) on Carson Llamas  being received from ED(unit) for routine progression of care      Report consisted of patients Situation, Background, Assessment and   Recommendations(SBAR). Information from the following report(s) SBAR, Kardex, Intake/Output, MAR and Recent Results was reviewed with the receiving nurse. Opportunity for questions and clarification was provided. Assessment completed upon patients arrival to unit and care assumed.

## 2018-03-22 NOTE — ED NOTES
Dr Elizondo Artist at bedside talking with pt, pt states she is feeling worse and pain has increased to 6/10

## 2018-03-22 NOTE — ED NOTES
Received pt to exam room, resting on stretcher in position of comfort, call bell given to pt, accompanied by spouse, pt reports right flank pain worsening over last 4 days, dysuria onset this am, [facility] and [facility] here

## 2018-03-22 NOTE — ED NOTES
Pt resting in bed, states she is feeling better and no longer has chills, awaiting inpatient room assignment

## 2018-03-22 NOTE — ED NOTES
Pt resting in bed with eyes closed, refusing dinner tray at this time, bread ordered from dietary per pt request, pt awaiting inpatient bed

## 2018-03-22 NOTE — ED NOTES
TRANSFER - OUT REPORT:    Verbal report given to Janella Leventhal RN(name) on Carson Llamas  being transferred to Renal(unit) for routine progression of care       Report consisted of patients Situation, Background, Assessment and   Recommendations(SBAR). Information from the following report(s) SBAR, Kardex, ED Summary, [facility] and Recent Results was reviewed with the receiving nurse. Lines:   Peripheral IV 03/22/18 Right Antecubital (Active)   Site Assessment Clean, dry, & intact 3/22/2018 11:08 AM        Opportunity for questions and clarification was provided.

## 2018-03-22 NOTE — H&P
Hospitalist Admission Note    NAME: Coleman Livingston   :  1985   MRN:  [id number]     Date/Time:  3/22/2018 3:34 PM    Patient PCP: Saleem Hawley NP  _____________________________________________________________________  Given the patient's current clinical presentation, I have a high level of concern for decompensation if discharged from the emergency department. Complex decision making was performed, which includes reviewing the patient's available past medical records, laboratory results, and x-ray films. My assessment of this patient's clinical condition and my plan of care is as follows. Assessment / Plan:    Sepsis due to acute pyelonephritis   Non obstructing renal stones  -CT abdomen Findings in the right kidney upper pole consistent with pyelonephritis. No  abscess is identified. The possibility of renal vascular etiology (infarct) is thought to be less likely. Bilateral renal calculi which are nonobstructing  -continue levaquin started in ER. Follow up on blood and urine cultures  -IVF hydration  -probiotics    Hot tub folliculitis  -exposure last weekend. Typically should cover for pseudomonas so switched Rocephin to levaquin. This should cover MSSA and strep also. Chronic thrombocytopenia  -follows with Dr Doreen Mary. No specific diagnosis. -follow counts while on lovenox    Depression  -continue effexor      Code Status:  Full  Surrogate Decision Maker:      DVT Prophylaxis:  lovenox   GI Prophylaxis: not indicated    Baseline:   . Independent         Subjective:   CHIEF COMPLAINT:   Flank pain    HISTORY OF PRESENT ILLNESS:     Jimbo Herron is a 29 y.o.  female with a prior history of pyelonephritis who was referred to the ER from [facility].   Patient initially presented for right flank pain, urinary pressure, nausea and chills x 4 days.    ER evaluation remarkable for UA with >100wbc, leukocytosis (15K), thrombocytopenia and CT abdomen showing findings consistent with acute pyelonephritis. Patient also noted a follicular type rash over her chest wall. She and her  came back from vacation and used a hot tube last weekend. She read up on hot tub folliculitis and was concerned that she now has it.       We were asked to admit for work up and evaluation of the above problems. Past Medical History:   Diagnosis Date    Abnormal Papanicolaou smear of cervix     Acute pyelonephritis 3/22/2018    Anemia     Coagulation disorder ([address]     thrombocytopenia with pregnancy's    Disease of blood and blood forming organ     thrombocytopenia    Fetal heart disease 2017        Past Surgical History:   Procedure Laterality Date     DELIVERY ONLY      HX  SECTION  2015    HX GYN  10/2015        HX HEENT      T&A       Social History   Substance Use Topics    Smoking status: Never Smoker    Smokeless tobacco: Never Used    Alcohol use Yes      Comment: occasion        Family History   Problem Relation Age of Onset    No Known Problems Mother     No Known Problems Father      Allergies   Allergen Reactions    Morphine Hives        Prior to Admission medications    Medication Sig Start Date End Date Taking? Authorizing Provider   acetaminophen (TYLENOL EXTRA STRENGTH) 500 mg tablet Take 1,000 mg by mouth every six (6) hours as needed for Pain. Yes Historical Provider   venlafaxine-SR [facility] 75 mg capsule Take 1 Cap by mouth daily.  3/13/18  Yes Kaleigh Elise NP       REVIEW OF SYSTEMS:       Total of 12 systems reviewed as follows:       POSITIVE= underlined text  Negative = text not underlined  General:  fever, chills, sweats, generalized weakness, weight loss/gain,      loss of appetite   Eyes:    blurred vision, eye pain, loss of vision, double vision  ENT:    rhinorrhea, pharyngitis   Respiratory:   cough, sputum production, SOB, WEINER, wheezing, pleuritic pain   Cardiology: chest pain, palpitations, orthopnea, PND, edema, syncope   Gastrointestinal:  abdominal pain / flank pain, nausea, diarrhea, dysphagia, constipation, bleeding   Genitourinary:  frequency, urgency, dysuria, hematuria, incontinence   Muskuloskeletal :  arthralgia, myalgia, back pain  Hematology:  easy bruising, nose or gum bleeding, lymphadenopathy   Dermatological: rash, ulceration, pruritis, color change / jaundice  Endocrine:   hot flashes or polydipsia   Neurological:  headache, dizziness, confusion, focal weakness, paresthesia,     Speech difficulties, memory loss, gait difficulty  Psychological: Feelings of anxiety, depression, agitation    Objective:   VITALS:    Visit Vitals    /75    Pulse (!) 110    Temp 100.1 °F (37.8 °C)    Resp 20    Ht 5' 7\" (1.702 m)    Wt 78.7 kg (173 lb 8 oz)    SpO2 100%    Breastfeeding No    BMI 27.17 kg/m2       PHYSICAL EXAM:    General:    Alert, cooperative, no distress, appears stated age. HEENT: Atraumatic, anicteric sclerae, pink conjunctivae     No oral ulcers, mucosa moist, throat clear, dentition fair  Neck:  Supple, symmetrical,  thyroid: non tender  Lungs:   Clear to auscultation bilaterally. No Wheezing or Rhonchi. No rales. Chest wall:  No tenderness  No Accessory muscle use. Heart:   Regular tachy rhythm,  No  murmur   No edema  Abdomen:   Soft, non-tender. Not distended. Bowel sounds normal  + right CVAT  Extremities: No cyanosis. No clubbing, Skin turgor normal, Capillary refill normal, Radial dial pulse 2+  Skin:     Not pale. Not Jaundiced  (+) scattered follicular rash along both sides of her chest   Psych:  Good insight. Not depressed. Not anxious or agitated. Neurologic: EOMs intact. No facial asymmetry. No aphasia or slurred speech. Symmetrical strength, Sensation grossly intact.  Alert and oriented X 4.     _______________________________________________________________________  Care Plan discussed with:    Comments   Patient x Family  x  spouse   RN     Care Manager                    Consultant:      _______________________________________________________________________  Expected  Disposition:   Home with Family x   HH/PT/OT/RN    SNF/LTC    KORTNEY    ________________________________________________________________________  TOTAL TIME:   48   Minutes    Critical Care Provided     Minutes non procedure based      Comments    x Reviewed previous records   >50% of visit spent in counseling and coordination of care  Discussion with patient and/or family and questions answered       Given the patient's current clinical presentation, I have a high level of concern for decompensation if discharged from the ED. Complex decision making was performed which includes reviewing the patient's available past medical records, laboratory results, and Xray films. I have also directly communicated my plan and discussed this case with the involved ED physician.     ____________________________________________________________________  Kurt Ellison MD    Procedures: see electronic medical records for all procedures/Xrays and details which were not copied into this note but were reviewed prior to creation of Plan. LAB DATA REVIEWED:    Recent Results (from the past 24 hour(s))   CBC WITH AUTOMATED DIFF    Collection Time: 03/22/18 11:00 AM   Result Value Ref Range    WBC 15.4 (H) 3.6 - 11.0 K/uL    RBC 4.00 3.80 - 5.20 M/uL    HGB 12.5 11.5 - 16.0 g/dL    HCT 37.4 35.0 - 47.0 %    MCV 93.5 80.0 - 99.0 FL    MCH 31.3 26.0 - 34.0 PG    MCHC 33.4 30.0 - 36.5 g/dL    RDW 13.0 11.5 - 14.5 %    PLATELET 95 (L) 610 - 400 K/uL    MPV 10.9 8.9 - 12.9 FL    NRBC 0.0 0  WBC    ABSOLUTE NRBC 0.00 0.00 - 0.01 K/uL    NEUTROPHILS 83 (H) 32 - 75 %    LYMPHOCYTES 9 (L) 12 - 49 %    MONOCYTES 7 5 - 13 %    EOSINOPHILS 1 0 - 7 %    BASOPHILS 0 0 - 1 %    IMMATURE GRANULOCYTES 0 0.0 - 0.5 %    ABS. NEUTROPHILS 12.8 (H) 1.8 - 8.0 K/UL    ABS.  LYMPHOCYTES 1.4 0.8 - 3.5 K/UL    ABS. MONOCYTES 1.1 (H) 0.0 - 1.0 K/UL    ABS. EOSINOPHILS 0.1 0.0 - 0.4 K/UL    ABS. BASOPHILS 0.0 0.0 - 0.1 K/UL    ABS. IMM. GRANS. 0.1 (H) 0.00 - 0.04 K/UL    DF AUTOMATED     URINALYSIS W/ REFLEX CULTURE    Collection Time: 03/22/18 11:00 AM   Result Value Ref Range    Color YELLOW/STRAW      Appearance CLOUDY (A) CLEAR      Specific gravity 1.016 1.003 - 1.030      pH (UA) 8.0 5.0 - 8.0      Protein 100 (A) NEG mg/dL    Glucose NEGATIVE  NEG mg/dL    Ketone 15 (A) NEG mg/dL    Bilirubin NEGATIVE  NEG      Blood SMALL (A) NEG      Urobilinogen 1.0 0.2 - 1.0 EU/dL    Nitrites NEGATIVE  NEG      Leukocyte Esterase MODERATE (A) NEG      WBC >100 (H) 0 - 4 /hpf    RBC 20-50 0 - 5 /hpf    Epithelial cells FEW FEW /lpf    Bacteria 1+ (A) NEG /hpf    UA:UC IF INDICATED URINE CULTURE ORDERED (A) CNI      Hyaline cast 0-2 0 - 5 /lpf   BETA HCG, QT    Collection Time: 03/22/18 11:00 AM   Result Value Ref Range    Beta HCG, QT <1 0 - 6 MIU/ML   METABOLIC PANEL, COMPREHENSIVE    Collection Time: 03/22/18 11:00 AM   Result Value Ref Range    Sodium 138 136 - 145 mmol/L    Potassium 3.9 3.5 - 5.1 mmol/L    Chloride 106 97 - 108 mmol/L    CO2 27 21 - 32 mmol/L    Anion gap 5 5 - 15 mmol/L    Glucose 79 65 - 100 mg/dL    BUN 9 6 - 20 MG/DL    Creatinine 0.68 0.55 - 1.02 MG/DL    BUN/Creatinine ratio 13 12 - 20      GFR est AA >60 >60 ml/min/1.73m2    GFR est non-AA >60 >60 ml/min/1.73m2    Calcium 8.5 8.5 - 10.1 MG/DL    Bilirubin, total 1.6 (H) 0.2 - 1.0 MG/DL    ALT (SGPT) 15 12 - 78 U/L    AST (SGOT) 13 (L) 15 - 37 U/L    Alk.  phosphatase 68 45 - 117 U/L    Protein, total 7.3 6.4 - 8.2 g/dL    Albumin 3.6 3.5 - 5.0 g/dL    Globulin 3.7 2.0 - 4.0 g/dL    A-G Ratio 1.0 (L) 1.1 - 2.2

## 2018-03-22 NOTE — IP AVS SNAPSHOT
[address] [facility] [address] [facility] 
[phone] Patient: Héctor Patino MRN: [id number] :1985 About your hospitalization You were admitted on:  2018 You last received care in the:  [facility] [place] [facility] You were discharged on:  2018 Why you were hospitalized Your primary diagnosis was:  Not on File Your diagnoses also included:  Acute Pyelonephritis Follow-up Information Follow up With Details Comments Contact Info Nandini Young NP   [address] [facility] [address] [address] 
[phone] Hillard Oppenheim, MD In 1 week  [address] [facility] 
[phone] Nandini Young NP In 3 days  [address] [facility] [address] [address] 
[phone] Your Scheduled Appointments 2018  3:30 PM EDT Any with Nandini Young NP [facility] [address] [facility]) [address] [facility] [address] [id number] [phone] Discharge Orders None A check deloris indicates which time of day the medication should be taken. My Medications START taking these medications Instructions Each Dose to Equal  
 Morning Noon Evening Bedtime L. acidoph & paracasei- S therm- Bifido 8 billion cell Cap cap Commonly known as:  BRENDAN-Q/RISAQUAD Start taking on:  3/26/2018 Your last dose was: Your next dose is: Take 1 Cap by mouth daily for 14 days. 1 Cap  
    
   
   
   
  
 levoFLOXacin 500 mg tablet Commonly known as:  Art Gann Your last dose was: Your next dose is: Take 1 Tab by mouth every twenty-four (24) hours for 6 days. 500 mg  
    
   
   
   
  
 oxyCODONE-acetaminophen 5-325 mg per tablet Commonly known as:  PERCOCET Your last dose was: Your next dose is: Take 2 Tabs by mouth every six (6) hours as needed for up to 12 doses. Max Daily Amount: 8 Tabs. 2 Tab CONTINUE taking these medications Instructions Each Dose to Equal  
 Morning Noon Evening Bedtime TYLENOL EXTRA STRENGTH 500 mg tablet Generic drug:  acetaminophen Your last dose was: Your next dose is: Take 1,000 mg by mouth every six (6) hours as needed for Pain. 1000 mg  
    
   
   
   
  
 venlafaxine-SR 75 mg capsule Commonly known as:  EFFEXOR-XR Your last dose was: Your next dose is: Take 1 Cap by mouth daily. 75 mg Where to Get Your Medications Information on where to get these meds will be given to you by the nurse or doctor. ! Ask your nurse or doctor about these medications L. acidoph & paracasei- S therm- Bifido 8 billion cell Cap cap  
 levoFLOXacin 500 mg tablet  
 oxyCODONE-acetaminophen 5-325 mg per tablet Discharge Instructions HOSPITALIST DISCHARGE INSTRUCTIONS 
 
NAME: Maciej Nguyen :  1985 MRN:  [id number] Date/Time:  3/25/2018 11:58 AM 
 
ADMIT DATE: 3/22/2018 DISCHARGE DATE: 3/25/2018 · It is important that you take the medication exactly as they are prescribed. · Keep your medication in the bottles provided by the pharmacist and keep a list of the medication names, dosages, and times to be taken in your wallet. · Do not take other medications without consulting your doctor. What to do at [facility] Recommended diet:  Regular Diet Recommended activity: Activity as tolerated If you have questions regarding the hospital related prescriptions or hospital related issues please call [facility] at [phone].  You can always direct your questions to your primary care doctor if you are unable to reach your hospital physician; your PCP works as an extension of your hospital doctor just like your hospital doctor is an extension of your PCP for your time at the hospital [facility], [facility]) If you experience any of the following symptoms then please call your primary care physician or return to the emergency room if you cannot get hold of your doctor: 
 
Fever, chills, nausea, vomiting, or persistent diarrhea Worsening weakness or new problems with your speech or balance Dark stools or visible blood in your stools New Leg swelling or shortness of breath as these could be signs of a clot Additional Instructions: 
 
Bring these papers with you to your follow up appointments. The papers will help your doctors be sure to continue the care plan from the hospital. 
 
 
F/u with PCP next week and get CBC to reveal platelets. F/u with hematology in 1-2 weeks Information obtained by : 
I understand that if any problems occur once I am at home I am to contact my physician. I understand and acknowledge receipt of the instructions indicated above. Physician's or R.N.'s Signature                                                                  Date/Time Patient or Representative Signature [email or web address] Announcement We are excited to announce that we are making your provider's discharge notes available to you in [email or web address]. You will see these notes when they are completed and signed by the physician that discharged you from your recent hospital stay.   If you have any questions or concerns about any information you see in [email or web address], please call the Health Information Department where you were seen or reach out to your Primary Care Provider for more information about your plan of care. Introducing [facility]! [employer] introduces [employer] patient portal. Now you can access parts of your medical record, email your doctor's office, and request medication refills online. 1. In your internet browser, go to [email or web address] 2. Click on the First Time User? Click Here link in the Sign In box. You will see the New Member Sign Up page. 3. Enter your [facility] [facility] Access Code exactly as it appears below. You will not need to use this code after youve completed the sign-up process. If you do not sign up before the expiration date, you must request a new code. · [employer] [facility] Access Code: [email or web address] Expires: 5/17/2018 12:18 PM 
 
4. Enter the last four digits of your Social Security Number (xxxx) and Date of Birth (mm/dd/yyyy) as indicated and click Submit. You will be taken to the next sign-up page. 5. Create a [facility] ID. This will be your [facility] [facility] login ID and cannot be changed, so think of one that is secure and easy to remember. 6. Create a [facility] [facility] password. You can change your password at any time. 7. Enter your Password Reset Question and Answer. This can be used at a later time if you forget your password. 8. Enter your e-mail address. You will receive e-mail notification when new information is available in [address] 9. Click Sign Up. You can now view and download portions of your medical record. 10. Click the Download Summary menu link to download a portable copy of your medical information. If you have questions, please visit the Frequently Asked Questions section of the [employer] website. Remember, [facility] [facility] is NOT to be used for urgent needs. For medical emergencies, dial 911. Now available from your iPhone and Android! Unresulted Labs-Please follow up with your PCP about these lab tests Order Current Status CULTURE, BLOOD, PAIRED Preliminary result Providers Seen During Your Hospitalization Provider Specialty Primary office phone Diallo Rubalcava MD Emergency Medicine [phone] Sridhar Garber MD Internal Medicine [phone] Angela Rabago MD Emergency Medicine [phone] Your Primary Care Physician (PCP) Primary Care Physician Office Phone Office Fax Tung NEELY [phone] You are allergic to the following Allergen Reactions Morphine Hives Recent Documentation Height Weight Breastfeeding? BMI OB Status Smoking Status 1.702 m 78.7 kg No 27.17 kg/m2 Having regular periods Never Smoker Emergency Contacts Name Discharge Info Relation Home Work Mobile [facility] DISCHARGE CAREGIVER [3] Spouse [3] [phone] Nataliya Lares DISCHARGE CAREGIVER [3] Parent [1] [phone] Ángel Haro DISCHARGE CAREGIVER [3] Spouse [3] [phone] Patient Belongings The following personal items are in your possession at time of discharge: 
  Dental Appliances: None  Visual Aid: Glasses, With patient      Home Medications: None   Jewelry: Earrings, Ring  Clothing: Pants, Shirt, Footwear, Socks, Undergarments    Other Valuables: [employer] Please provide this summary of care documentation to your next provider. Signatures-by signing, you are acknowledging that this After Visit Summary has been reviewed with you and you have received a copy. Patient Signature:  ____________________________________________________________ Date:  ____________________________________________________________  
  
Alanis Tejada Provider Signature:  ____________________________________________________________ Date:  ____________________________________________________________

## 2018-03-23 LAB
ANION GAP SERPL CALC-SCNC: 6 MMOL/L (ref 5–15)
BASOPHILS # BLD: 0 K/UL (ref 0–0.1)
BASOPHILS NFR BLD: 0 % (ref 0–1)
BUN SERPL-MCNC: 8 MG/DL (ref 6–20)
BUN/CREAT SERPL: 11 (ref 12–20)
CALCIUM SERPL-MCNC: 7.6 MG/DL (ref 8.5–10.1)
CHLORIDE SERPL-SCNC: 111 MMOL/L (ref 97–108)
CO2 SERPL-SCNC: 23 MMOL/L (ref 21–32)
CREAT SERPL-MCNC: 0.7 MG/DL (ref 0.55–1.02)
DIFFERENTIAL METHOD BLD: ABNORMAL
EOSINOPHIL # BLD: 0.1 K/UL (ref 0–0.4)
EOSINOPHIL NFR BLD: 0 % (ref 0–7)
ERYTHROCYTE [DISTWIDTH] IN BLOOD BY AUTOMATED COUNT: 13.2 % (ref 11.5–14.5)
GLUCOSE SERPL-MCNC: 81 MG/DL (ref 65–100)
HCT VFR BLD AUTO: 31.6 % (ref 35–47)
HGB BLD-MCNC: 10.4 G/DL (ref 11.5–16)
IMM GRANULOCYTES # BLD: 0.1 K/UL (ref 0–0.04)
IMM GRANULOCYTES NFR BLD AUTO: 1 % (ref 0–0.5)
LYMPHOCYTES # BLD: 0.9 K/UL (ref 0.8–3.5)
LYMPHOCYTES NFR BLD: 7 % (ref 12–49)
MAGNESIUM SERPL-MCNC: 1.7 MG/DL (ref 1.6–2.4)
MCH RBC QN AUTO: 31.7 PG (ref 26–34)
MCHC RBC AUTO-ENTMCNC: 32.9 G/DL (ref 30–36.5)
MCV RBC AUTO: 96.3 FL (ref 80–99)
MONOCYTES # BLD: 1.3 K/UL (ref 0–1)
MONOCYTES NFR BLD: 10 % (ref 5–13)
NEUTS SEG # BLD: 10.5 K/UL (ref 1.8–8)
NEUTS SEG NFR BLD: 82 % (ref 32–75)
NRBC # BLD: 0 K/UL (ref 0–0.01)
NRBC BLD-RTO: 0 PER 100 WBC
PLATELET # BLD AUTO: 74 K/UL (ref 150–400)
PMV BLD AUTO: 10.9 FL (ref 8.9–12.9)
POTASSIUM SERPL-SCNC: 4 MMOL/L (ref 3.5–5.1)
RBC # BLD AUTO: 3.28 M/UL (ref 3.8–5.2)
SODIUM SERPL-SCNC: 140 MMOL/L (ref 136–145)
WBC # BLD AUTO: 12.7 K/UL (ref 3.6–11)

## 2018-03-23 PROCEDURE — 36415 COLL VENOUS BLD VENIPUNCTURE: CPT | Performed by: INTERNAL MEDICINE

## 2018-03-23 PROCEDURE — 83735 ASSAY OF MAGNESIUM: CPT | Performed by: INTERNAL MEDICINE

## 2018-03-23 PROCEDURE — 80048 BASIC METABOLIC PNL TOTAL CA: CPT | Performed by: INTERNAL MEDICINE

## 2018-03-23 PROCEDURE — 74011250637 HC RX REV CODE- 250/637: Performed by: INTERNAL MEDICINE

## 2018-03-23 PROCEDURE — 74011250636 HC RX REV CODE- 250/636: Performed by: INTERNAL MEDICINE

## 2018-03-23 PROCEDURE — 65270000029 HC RM PRIVATE

## 2018-03-23 PROCEDURE — 85025 COMPLETE CBC W/AUTO DIFF WBC: CPT | Performed by: INTERNAL MEDICINE

## 2018-03-23 RX ORDER — VENLAFAXINE HYDROCHLORIDE 37.5 MG/1
75 CAPSULE, EXTENDED RELEASE ORAL DAILY
Status: DISCONTINUED | OUTPATIENT
Start: 2018-03-23 | End: 2018-03-24

## 2018-03-23 RX ADMIN — ONDANSETRON 4 MG: 2 INJECTION INTRAMUSCULAR; INTRAVENOUS at 20:34

## 2018-03-23 RX ADMIN — KETOROLAC TROMETHAMINE 15 MG: 30 INJECTION, SOLUTION INTRAMUSCULAR at 17:31

## 2018-03-23 RX ADMIN — ACETAMINOPHEN 650 MG: 325 TABLET ORAL at 10:32

## 2018-03-23 RX ADMIN — ACETAMINOPHEN 650 MG: 325 TABLET ORAL at 17:30

## 2018-03-23 RX ADMIN — SODIUM CHLORIDE 125 ML/HR: 900 INJECTION, SOLUTION INTRAVENOUS at 20:47

## 2018-03-23 RX ADMIN — Medication 10 ML: at 20:34

## 2018-03-23 RX ADMIN — FENTANYL CITRATE 75 MCG: 50 INJECTION, SOLUTION INTRAMUSCULAR; INTRAVENOUS at 20:34

## 2018-03-23 RX ADMIN — KETOROLAC TROMETHAMINE 15 MG: 30 INJECTION, SOLUTION INTRAMUSCULAR at 05:53

## 2018-03-23 RX ADMIN — OXYCODONE HYDROCHLORIDE AND ACETAMINOPHEN 1 TABLET: 5; 325 TABLET ORAL at 09:32

## 2018-03-23 RX ADMIN — SODIUM CHLORIDE 125 ML/HR: 900 INJECTION, SOLUTION INTRAVENOUS at 00:58

## 2018-03-23 RX ADMIN — KETOROLAC TROMETHAMINE 15 MG: 30 INJECTION, SOLUTION INTRAMUSCULAR at 00:54

## 2018-03-23 RX ADMIN — LEVOFLOXACIN 750 MG: 5 INJECTION, SOLUTION INTRAVENOUS at 15:11

## 2018-03-23 RX ADMIN — SODIUM CHLORIDE 125 ML/HR: 900 INJECTION, SOLUTION INTRAVENOUS at 10:28

## 2018-03-23 RX ADMIN — ONDANSETRON 4 MG: 2 INJECTION INTRAMUSCULAR; INTRAVENOUS at 05:53

## 2018-03-23 RX ADMIN — Medication 1 CAPSULE: at 08:08

## 2018-03-23 RX ADMIN — ONDANSETRON 4 MG: 2 INJECTION INTRAMUSCULAR; INTRAVENOUS at 16:56

## 2018-03-23 RX ADMIN — KETOROLAC TROMETHAMINE 15 MG: 30 INJECTION, SOLUTION INTRAMUSCULAR at 12:22

## 2018-03-23 NOTE — PROGRESS NOTES
Bedside shift change report given to Sagar Garcia, RN (oncoming nurse) by Rony/Tim RN (offgoing nurse). Report included the following information SBAR, ED Summary, Intake/Output, MAR, Recent Results and Cardiac Rhythm NSR.     Zone Phone for oncoming shift:   [phone]    Shift Summary: pt. resting    LDAs               Peripheral IV 03/22/18 Right Antecubital (Active)   Site Assessment Clean, dry, & intact 3/23/2018  3:55 AM   Phlebitis Assessment 0 3/23/2018  3:55 AM   Infiltration Assessment 0 3/23/2018  3:55 AM   Dressing Status Clean, dry, & intact 3/23/2018  3:55 AM   Dressing Type Transparent;Tape 3/23/2018  3:55 AM   Hub Color/Line Status Pink;Flushed;Capped 3/23/2018  3:55 AM   Alcohol Cap Used Yes 3/23/2018  3:55 AM                        Intake & Output   Date 03/22/18 0700 - 03/23/18 0659 03/23/18 0700 - 03/24/18 0659   Shift 4126-1011 3351-3497 24 Hour Total 6583-4461 0792-2849 24 Hour Total   I  N  T  A  K  E   I.V.  (mL/kg/hr)  1183.3 1183.3         Volume (0.9% sodium chloride infusion)  1183.3 1183.3       Shift Total  (mL/kg)  1183.3  (15) 1183.3  (15)      O  U  T  P  U  T   Urine  (mL/kg/hr)            Urine Occurrence(s)  2 x 2 x       Shift Total  (mL/kg)         NET  1183.3 1183.3      Weight (kg) 78.7 78.7 78.7 78.7 78.7 78.7      Last Bowel Movement Last Bowel Movement Date: 03/21/18   Glucose Checks [x] N/A  [] AC/HS  [] Q6  Concerns:   Nutrition Active Orders   Diet    DIET REGULAR       Consults []PT  []OT  []Speech  []Case Management   Cardiac Monitoring [x]N/A []Yes Expires:

## 2018-03-23 NOTE — PROGRESS NOTES
Hospitalist Progress Note    NAME: Cassidy Wheeler   :  1985   MRN:  [id number]       Assessment / Plan:  Sepsis due to acute pyelonephritis   Gram negative UTI  Non obstructing renal stones  -CT abdomen Findings in the right kidney upper pole consistent with pyelonephritis. No  abscess is identified. The possibility of renal vascular etiology (infarct) is thought to be less likely. Bilateral renal calculi which are nonobstructing  -continue Levaquin. Urine cx prelim showing GNR. Blood cx NGTD  -cont IVF hydration  -cont probiotics  -due to migration of pain to rlq will check cmp and lipase in am tomorrow     Hot tub folliculitis  -exposure last weekend.   -cont Levaquin for now and reassess rash in am for improvement     Chronic thrombocytopenia  -follows with Dr Heidy William and per recent evaluation felt she might have ITP and recommended follow up in 3 months  -follow counts while on lovenox     Depression  -continue effexor        Code Status:  Full  Surrogate Decision Maker:       DVT Prophylaxis:  lovenox   GI Prophylaxis: not indicated     Baseline:   . Independent       Body mass index is 27.17 kg/(m^2). Recommended Disposition: Home w/Family     Subjective:     Chief Complaint / Reason for Physician Visit  Reports pain in rlq today along with rt flank pain. No nausea or vomiting  Rash on the chest is the same and is painful    Review of Systems:  Symptom Y/N Comments  Symptom Y/N Comments   Fever/Chills n   Chest Pain     Poor Appetite    Edema     Cough n   Abdominal Pain y    Sputum    Joint Pain     SOB/WEINER n   Pruritis/Rash     Nausea/vomit    Tolerating PT/OT     Diarrhea n   Tolerating Diet     Constipation    Other       Could NOT obtain due to:      Objective:     VITALS:   Last 24hrs VS reviewed since prior progress note.  Most recent are:  Patient Vitals for the past 24 hrs:   Temp Pulse Resp BP SpO2   18 1455 98.5 °F (36.9 °C) 83 20 94/62 100 %   18 0819 98.7 °F (37.1 °C) 100 20 106/62 99 %   03/23/18 0102 98.9 °F (37.2 °C) (!) 109 18 104/54 98 %   03/22/18 2042 98.5 °F (36.9 °C) (!) 102 20 103/63 95 %   03/22/18 2033 - - - 122/74 -   03/22/18 2012 99 °F (37.2 °C) (!) 102 16 (!) 88/54 99 %   03/22/18 1604 (!) 100.7 °F (38.2 °C) - - - -       Intake/Output Summary (Last 24 hours) at 03/23/18 1501  Last data filed at 03/23/18 1224   Gross per 24 hour   Intake          1183.33 ml   Output              200 ml   Net           983.33 ml        PHYSICAL EXAM:  General: cooperative, no acute distress    EENT:  EOMI. Anicteric sclerae. MMM  Resp:  CTA bilaterally, no wheezing or rales.   No accessory muscle use  CV:  Regular  rhythm,  No edema  GI:  Soft, rt flank and rlq tenderness  Neurologic:  Alert and oriented X 3, normal speech,   Psych:   Not anxious nor agitated  Skin:  Discrete erythematous rash on chest and back    Reviewed most current lab test results and cultures  YES  Reviewed most current radiology test results   YES  Review and summation of old records today    NO  Reviewed patient's current orders and MAR    YES  PMH/SH reviewed - no change compared to H&P          Current Facility-Administered Medications:     venlafaxine-SR (EFFEXOR-XR) capsule 75 mg, 75 mg, Oral, DAILY, Laz Shannon MD    sodium chloride (NS) flush 5-10 mL, 5-10 mL, IntraVENous, PRN, Mary Kay Vick MD    0.9% sodium chloride infusion, 125 mL/hr, IntraVENous, CONTINUOUS, Laz Shannon MD, Last Rate: 125 mL/hr at 03/23/18 1028, 125 mL/hr at 03/23/18 1028    acetaminophen (TYLENOL) tablet 650 mg, 650 mg, Oral, Q4H PRN, Laz Shannon MD, 650 mg at 03/23/18 1032    oxyCODONE-acetaminophen (PERCOCET) 5-325 mg per tablet 1 Tab, 1 Tab, Oral, Q4H PRN, Laz Shannon MD, 1 Tab at 03/23/18 0932    naloxone [facility]) injection 0.4 mg, 0.4 mg, IntraVENous, PRN, Laz Shannon MD    ondansetron [facility]) injection 4 mg, 4 mg, IntraVENous, Q4H PRN, Laz Shannon MD, 4 mg at 03/23/18 0553    enoxaparin (LOVENOX) injection 40 mg, 40 mg, SubCUTAneous, QHS, Darwyn Leventhal, MD, Stopped at 03/22/18 2100    lactobac ac& pc-s.rhoda-b.anim (BRENDAN Q/RISAQUAD), 1 Cap, Oral, DAILY, Darwyn Leventhal, MD, 1 Cap at 03/23/18 6650    ketorolac (TORADOL) injection 15 mg, 15 mg, IntraVENous, Q6H, Darwyn Leventhal, MD, 15 mg at 03/23/18 1222    fentaNYL citrate (PF) injection 75 mcg, 75 mcg, IntraVENous, Q4H PRN, Darwyn Leventhal, MD, 75 mcg at 03/22/18 2056    levoFLOXacin (LEVAQUIN) 750 mg in D5W IVPB, 750 mg, IntraVENous, Q24H, Darwyn Leventhal, MD  ________________________________________________________________________  Care Plan discussed with:    Comments   Patient y    Family      RN y    Care Manager     Consultant                        Multidiciplinary team rounds were held today with , nursing, pharmacist and clinical coordinator. Patient's plan of care was discussed; medications were reviewed and discharge planning was addressed. ________________________________________________________________________  Total NON critical care TIME:  35    Minutes    Total CRITICAL CARE TIME Spent:   Minutes non procedure based      Comments   >50% of visit spent in counseling and coordination of care     ________________________________________________________________________  Claire Neal MD     Procedures: see electronic medical records for all procedures/Xrays and details which were not copied into this note but were reviewed prior to creation of Plan. LABS:  I reviewed today's most current labs and imaging studies.   Pertinent labs include:  Recent Labs      03/23/18   0116  03/22/18   1100   WBC  12.7*  15.4*   HGB  10.4*  12.5   HCT  31.6*  37.4   PLT  74*  95*     Recent Labs      03/23/18   0116  03/22/18   1100   NA  140  138   K  4.0  3.9   CL  111*  106   CO2  23  27   GLU  81  79   BUN  8  9   CREA  0.70  0.68   CA  7.6*  8.5   MG  1.7   --    ALB   --   3.6   TBILI   --   1.6*   SGOT   --   13*   ALT   --   15 Signed: Hector Callaway MD

## 2018-03-23 NOTE — PROGRESS NOTES
Spiritual Care Partner Volunteer visited patient in [address] on 3/23/18. Documented by:  Samara Cantu M.Div.    Paging Service [phone])

## 2018-03-23 NOTE — PROGRESS NOTES
Primary Nurse Jennifer Araya RN and Feliciano Speaker performed a dual skin assessment on this patient Impairment noted- see wound doc flow sheet - small rash noted on chest, believed to be hot tub folliculitis per MD note in ED. Gerber score is 22.

## 2018-03-24 ENCOUNTER — APPOINTMENT (OUTPATIENT)
Dept: ULTRASOUND IMAGING | Age: 33
DRG: 872 | End: 2018-03-24
Attending: EMERGENCY MEDICINE
Payer: COMMERCIAL

## 2018-03-24 LAB
ALBUMIN SERPL-MCNC: 2.6 G/DL (ref 3.5–5)
ALBUMIN/GLOB SERPL: 0.8 {RATIO} (ref 1.1–2.2)
ALP SERPL-CCNC: 58 U/L (ref 45–117)
ALT SERPL-CCNC: 24 U/L (ref 12–78)
ANION GAP SERPL CALC-SCNC: 6 MMOL/L (ref 5–15)
AST SERPL-CCNC: 23 U/L (ref 15–37)
BACTERIA SPEC CULT: ABNORMAL
BILIRUB SERPL-MCNC: 0.7 MG/DL (ref 0.2–1)
BUN SERPL-MCNC: 6 MG/DL (ref 6–20)
BUN/CREAT SERPL: 11 (ref 12–20)
CALCIUM SERPL-MCNC: 7.9 MG/DL (ref 8.5–10.1)
CC UR VC: ABNORMAL
CHLORIDE SERPL-SCNC: 111 MMOL/L (ref 97–108)
CO2 SERPL-SCNC: 23 MMOL/L (ref 21–32)
CREAT SERPL-MCNC: 0.56 MG/DL (ref 0.55–1.02)
ERYTHROCYTE [DISTWIDTH] IN BLOOD BY AUTOMATED COUNT: 13 % (ref 11.5–14.5)
GLOBULIN SER CALC-MCNC: 3.4 G/DL (ref 2–4)
GLUCOSE SERPL-MCNC: 87 MG/DL (ref 65–100)
HCT VFR BLD AUTO: 30.1 % (ref 35–47)
HGB BLD-MCNC: 9.8 G/DL (ref 11.5–16)
LIPASE SERPL-CCNC: 81 U/L (ref 73–393)
MCH RBC QN AUTO: 31.2 PG (ref 26–34)
MCHC RBC AUTO-ENTMCNC: 32.6 G/DL (ref 30–36.5)
MCV RBC AUTO: 95.9 FL (ref 80–99)
NRBC # BLD: 0 K/UL (ref 0–0.01)
NRBC BLD-RTO: 0 PER 100 WBC
PLATELET # BLD AUTO: 63 K/UL (ref 150–400)
PMV BLD AUTO: 11.1 FL (ref 8.9–12.9)
POTASSIUM SERPL-SCNC: 3.8 MMOL/L (ref 3.5–5.1)
PROT SERPL-MCNC: 6 G/DL (ref 6.4–8.2)
RBC # BLD AUTO: 3.14 M/UL (ref 3.8–5.2)
SERVICE CMNT-IMP: ABNORMAL
SODIUM SERPL-SCNC: 140 MMOL/L (ref 136–145)
WBC # BLD AUTO: 7.8 K/UL (ref 3.6–11)

## 2018-03-24 PROCEDURE — 76700 US EXAM ABDOM COMPLETE: CPT

## 2018-03-24 PROCEDURE — 74011250637 HC RX REV CODE- 250/637: Performed by: INTERNAL MEDICINE

## 2018-03-24 PROCEDURE — 74011250636 HC RX REV CODE- 250/636: Performed by: INTERNAL MEDICINE

## 2018-03-24 PROCEDURE — 74011250637 HC RX REV CODE- 250/637: Performed by: EMERGENCY MEDICINE

## 2018-03-24 PROCEDURE — 85027 COMPLETE CBC AUTOMATED: CPT | Performed by: INTERNAL MEDICINE

## 2018-03-24 PROCEDURE — 83690 ASSAY OF LIPASE: CPT | Performed by: INTERNAL MEDICINE

## 2018-03-24 PROCEDURE — 65270000029 HC RM PRIVATE

## 2018-03-24 PROCEDURE — 80053 COMPREHEN METABOLIC PANEL: CPT | Performed by: INTERNAL MEDICINE

## 2018-03-24 PROCEDURE — 36415 COLL VENOUS BLD VENIPUNCTURE: CPT | Performed by: INTERNAL MEDICINE

## 2018-03-24 RX ORDER — VENLAFAXINE HYDROCHLORIDE 37.5 MG/1
75 CAPSULE, EXTENDED RELEASE ORAL EVERY EVENING
Status: DISCONTINUED | OUTPATIENT
Start: 2018-03-24 | End: 2018-03-25 | Stop reason: HOSPADM

## 2018-03-24 RX ORDER — OXYCODONE AND ACETAMINOPHEN 5; 325 MG/1; MG/1
2 TABLET ORAL
Status: DISCONTINUED | OUTPATIENT
Start: 2018-03-24 | End: 2018-03-25 | Stop reason: HOSPADM

## 2018-03-24 RX ADMIN — KETOROLAC TROMETHAMINE 15 MG: 30 INJECTION, SOLUTION INTRAMUSCULAR at 11:48

## 2018-03-24 RX ADMIN — KETOROLAC TROMETHAMINE 15 MG: 30 INJECTION, SOLUTION INTRAMUSCULAR at 00:00

## 2018-03-24 RX ADMIN — Medication 10 ML: at 00:00

## 2018-03-24 RX ADMIN — Medication 1 CAPSULE: at 08:03

## 2018-03-24 RX ADMIN — Medication 10 ML: at 04:39

## 2018-03-24 RX ADMIN — Medication 10 ML: at 05:56

## 2018-03-24 RX ADMIN — LEVOFLOXACIN 750 MG: 5 INJECTION, SOLUTION INTRAVENOUS at 15:47

## 2018-03-24 RX ADMIN — FENTANYL CITRATE 75 MCG: 50 INJECTION, SOLUTION INTRAMUSCULAR; INTRAVENOUS at 04:38

## 2018-03-24 RX ADMIN — ONDANSETRON 4 MG: 2 INJECTION INTRAMUSCULAR; INTRAVENOUS at 04:38

## 2018-03-24 RX ADMIN — SODIUM CHLORIDE 125 ML/HR: 900 INJECTION, SOLUTION INTRAVENOUS at 12:36

## 2018-03-24 RX ADMIN — FENTANYL CITRATE 75 MCG: 50 INJECTION, SOLUTION INTRAMUSCULAR; INTRAVENOUS at 11:03

## 2018-03-24 RX ADMIN — KETOROLAC TROMETHAMINE 15 MG: 30 INJECTION, SOLUTION INTRAMUSCULAR at 23:40

## 2018-03-24 RX ADMIN — OXYCODONE HYDROCHLORIDE AND ACETAMINOPHEN 2 TABLET: 5; 325 TABLET ORAL at 21:13

## 2018-03-24 RX ADMIN — SODIUM CHLORIDE 125 ML/HR: 900 INJECTION, SOLUTION INTRAVENOUS at 04:41

## 2018-03-24 RX ADMIN — KETOROLAC TROMETHAMINE 15 MG: 30 INJECTION, SOLUTION INTRAMUSCULAR at 17:32

## 2018-03-24 RX ADMIN — KETOROLAC TROMETHAMINE 15 MG: 30 INJECTION, SOLUTION INTRAMUSCULAR at 05:54

## 2018-03-24 NOTE — PROGRESS NOTES
Bedside and Verbal shift change report given to Ubaldo Clark (oncoming nurse) by Marly Mart (offgoing nurse). Report included the following information SBAR, Kardex, Intake/Output, MAR, Recent Results and Med Rec Status.

## 2018-03-24 NOTE — PROGRESS NOTES
Pt is up walking in halls. Up to shower. She does report that her pain seems worse today than yesterday. Pain meds given as needed.

## 2018-03-24 NOTE — PROGRESS NOTES
Hospitalist Progress Note    NAME: Butch Wan   :  1985   MRN:  [id number]       Assessment / Plan:    Sepsis due to acute pyelonephritis   E Coli UTI  Non obstructing renal stones  -CT abdomen Findings in the right kidney upper pole consistent with pyelonephritis. No  abscess is identified. The possibility of renal vascular etiology (infarct) is thought to be less likely. Bilateral renal calculi which are nonobstructing  -continue Levaquin. Urine cx e. Coli pansensitive. Blood cx NGTD  -decrease IVF hydration  -cont probiotics  -due to migration of pain to RUQ, cmp and lipase ok this am  -WBC normalized today, no further fever  -still with localized pain, will get US to eval further, may need CT repeated if pain persistent to reeval for abscess or infarct.  -encouraged to use po pain meds and dc fentanyl, cont IV toradol  -reepat labs in am       Hot tub folliculitis  -exposure last weekend.   -cont Levaquin for now and reassess rash in am for improvement      Chronic thrombocytopenia  -follows with Dr Simone Islas and per recent evaluation felt she might have ITP and recommended follow up in 3 months  -plts 60s, only slightly down from yesterday, likely worsened by sepsis, follow on lovenox for now      Depression  -continue effexor          Code Status:  Full  Surrogate Decision Maker: Page Gerber      DVT Prophylaxis:  lovenox   GI Prophylaxis: not indicated      Baseline:   .   Independent         Body mass index is 27.17 kg/(m^2).       Recommended Disposition: Home w/Family         Subjective:     Chief Complaint / Reason for Physician Visit  R flank pain    Patient reports that he has pain in the right upper quadrant last night required some doses of fentanyl. It is better today. She denies any nausea vomiting but poor appetite. She denies any dysuria or frequency. Her pain when she came in was in the right back region and that is much improved.  The pain in her right upper quadrant seems to be getting worse if she moves in any way she only gets comfortable if she is sleeping flat on her stomach. She denies any chest pain shortness of breath or cough. Patient evaluated at 1:45 PM    Discussed with RN events overnight. Review of Systems:  Symptom Y/N Comments  Symptom Y/N Comments   Fever/Chills    Chest Pain n    Poor Appetite y   Edema     Cough n   Abdominal Pain n    Sputum n   Joint Pain     SOB/WEINER n   Pruritis/Rash     Nausea/vomit n   Tolerating PT/OT     Diarrhea    Tolerating Diet     Constipation    Other       Could NOT obtain due to:      Objective:     VITALS:   Last 24hrs VS reviewed since prior progress note. Most recent are:  Patient Vitals for the past 24 hrs:   Temp Pulse Resp BP SpO2   03/24/18 0806 98.1 °F (36.7 °C) 80 20 105/71 99 %   03/23/18 2355 98.9 °F (37.2 °C) 76 18 111/68 99 %   03/23/18 1726 99.8 °F (37.7 °C) 99 16 100/63 100 %   03/23/18 1455 98.5 °F (36.9 °C) 83 20 94/62 100 %       Intake/Output Summary (Last 24 hours) at 03/24/18 1437  Last data filed at 03/24/18 0602   Gross per 24 hour   Intake             2950 ml   Output              950 ml   Net             2000 ml        PHYSICAL EXAM:  Patient is awake and alert she is oriented ×3 well-appearing no distress on room air extraocular movements are intact sclera nonicteric and pink oropharynx mucous membranes moist and pink neck is supple cardiovascular regular rate no murmurs rubs or gallops lungs are clear without wheezes rhonchi or crackles abdomen bowel sounds are present soft she does have tenderness in the right upper quadrant to palpation but no rebound or guarding no definitive Villar's sign.  She has minimal right sided CVA tenderness extremities no clubbing cyanosis or edema    y  Reviewed most current lab test results and cultures  YES  Reviewed most current radiology test results   YES  Review and summation of old records today    NO  Reviewed patient's current orders and MAR    YES  PMH/SH reviewed - no change compared to H&P  ________________________________________________________________________  Care Plan discussed with:    Comments   Patient y    [facility]                        Multidiciplinary team rounds were held today with , nursing, pharmacist and clinical coordinator. Patient's plan of care was discussed; medications were reviewed and discharge planning was addressed. ________________________________________________________________________  Total NON critical care TIME:  Minutes    Total CRITICAL CARE TIME Spent:   Minutes non procedure based      Comments   >50% of visit spent in counseling and coordination of care     ________________________________________________________________________  Faby Mcclelland MD     Procedures: see electronic medical records for all procedures/Xrays and details which were not copied into this note but were reviewed prior to creation of Plan. LABS:  I reviewed today's most current labs and imaging studies.   Pertinent labs include:  Recent Labs      03/24/18 0452 03/23/18   0116  03/22/18   1100   WBC  7.8  12.7*  15.4*   HGB  9.8*  10.4*  12.5   HCT  30.1*  31.6*  37.4   PLT  63*  74*  95*     Recent Labs      03/24/18 0452 03/23/18   0116  03/22/18   1100   NA  140  140  138   K  3.8  4.0  3.9   CL  111*  111*  106   CO2  23  23  27   GLU  87  81  79   BUN  6  8  9   CREA  0.56  0.70  0.68   CA  7.9*  7.6*  8.5   MG   --   1.7   --    ALB  2.6*   --   3.6   TBILI  0.7   --   1.6*   SGOT  23   --   13*   ALT  24   --   15       Signed: Faby Mcclelland MD

## 2018-03-25 VITALS
OXYGEN SATURATION: 100 % | HEIGHT: 67 IN | SYSTOLIC BLOOD PRESSURE: 118 MMHG | BODY MASS INDEX: 27.23 KG/M2 | WEIGHT: 173.5 LBS | DIASTOLIC BLOOD PRESSURE: 78 MMHG | RESPIRATION RATE: 20 BRPM | TEMPERATURE: 97.9 F | HEART RATE: 66 BPM

## 2018-03-25 LAB
AMYLASE SERPL-CCNC: 22 U/L (ref 25–115)
ANION GAP SERPL CALC-SCNC: 5 MMOL/L (ref 5–15)
APTT PPP: 24.3 SEC (ref 22.1–32)
BASOPHILS # BLD: 0 K/UL (ref 0–0.1)
BASOPHILS NFR BLD: 1 % (ref 0–1)
BUN SERPL-MCNC: 6 MG/DL (ref 6–20)
BUN/CREAT SERPL: 11 (ref 12–20)
CALCIUM SERPL-MCNC: 8.2 MG/DL (ref 8.5–10.1)
CHLORIDE SERPL-SCNC: 111 MMOL/L (ref 97–108)
CO2 SERPL-SCNC: 28 MMOL/L (ref 21–32)
CREAT SERPL-MCNC: 0.54 MG/DL (ref 0.55–1.02)
DIFFERENTIAL METHOD BLD: ABNORMAL
EOSINOPHIL # BLD: 0.3 K/UL (ref 0–0.4)
EOSINOPHIL NFR BLD: 5 % (ref 0–7)
ERYTHROCYTE [DISTWIDTH] IN BLOOD BY AUTOMATED COUNT: 13 % (ref 11.5–14.5)
ERYTHROCYTE [SEDIMENTATION RATE] IN BLOOD: 78 MM/HR (ref 0–20)
GLUCOSE SERPL-MCNC: 86 MG/DL (ref 65–100)
HCT VFR BLD AUTO: 29.6 % (ref 35–47)
HGB BLD-MCNC: 9.6 G/DL (ref 11.5–16)
IMM GRANULOCYTES # BLD: 0 K/UL (ref 0–0.04)
IMM GRANULOCYTES NFR BLD AUTO: 0 % (ref 0–0.5)
INR PPP: 1 (ref 0.9–1.1)
LIPASE SERPL-CCNC: 67 U/L (ref 73–393)
LYMPHOCYTES # BLD: 1.5 K/UL (ref 0.8–3.5)
LYMPHOCYTES NFR BLD: 24 % (ref 12–49)
MAGNESIUM SERPL-MCNC: 1.6 MG/DL (ref 1.6–2.4)
MCH RBC QN AUTO: 31.2 PG (ref 26–34)
MCHC RBC AUTO-ENTMCNC: 32.4 G/DL (ref 30–36.5)
MCV RBC AUTO: 96.1 FL (ref 80–99)
MONOCYTES # BLD: 0.8 K/UL (ref 0–1)
MONOCYTES NFR BLD: 14 % (ref 5–13)
NEUTS SEG # BLD: 3.4 K/UL (ref 1.8–8)
NEUTS SEG NFR BLD: 56 % (ref 32–75)
NRBC # BLD: 0 K/UL (ref 0–0.01)
NRBC BLD-RTO: 0 PER 100 WBC
PHOSPHATE SERPL-MCNC: 2.6 MG/DL (ref 2.6–4.7)
PLATELET # BLD AUTO: 56 K/UL (ref 150–400)
PMV BLD AUTO: 11.3 FL (ref 8.9–12.9)
POTASSIUM SERPL-SCNC: 4.2 MMOL/L (ref 3.5–5.1)
PROTHROMBIN TIME: 10.5 SEC (ref 9–11.1)
RBC # BLD AUTO: 3.08 M/UL (ref 3.8–5.2)
SODIUM SERPL-SCNC: 144 MMOL/L (ref 136–145)
THERAPEUTIC RANGE,PTTT: NORMAL SECS (ref 58–77)
WBC # BLD AUTO: 6 K/UL (ref 3.6–11)

## 2018-03-25 PROCEDURE — 84100 ASSAY OF PHOSPHORUS: CPT

## 2018-03-25 PROCEDURE — 85610 PROTHROMBIN TIME: CPT

## 2018-03-25 PROCEDURE — 74011250636 HC RX REV CODE- 250/636: Performed by: INTERNAL MEDICINE

## 2018-03-25 PROCEDURE — 85025 COMPLETE CBC W/AUTO DIFF WBC: CPT

## 2018-03-25 PROCEDURE — 36415 COLL VENOUS BLD VENIPUNCTURE: CPT

## 2018-03-25 PROCEDURE — 74011250636 HC RX REV CODE- 250/636: Performed by: EMERGENCY MEDICINE

## 2018-03-25 PROCEDURE — 85652 RBC SED RATE AUTOMATED: CPT

## 2018-03-25 PROCEDURE — 80048 BASIC METABOLIC PNL TOTAL CA: CPT

## 2018-03-25 PROCEDURE — 82150 ASSAY OF AMYLASE: CPT

## 2018-03-25 PROCEDURE — 83690 ASSAY OF LIPASE: CPT

## 2018-03-25 PROCEDURE — 83735 ASSAY OF MAGNESIUM: CPT

## 2018-03-25 PROCEDURE — 85730 THROMBOPLASTIN TIME PARTIAL: CPT

## 2018-03-25 PROCEDURE — 74011250637 HC RX REV CODE- 250/637: Performed by: EMERGENCY MEDICINE

## 2018-03-25 PROCEDURE — 74011250637 HC RX REV CODE- 250/637: Performed by: INTERNAL MEDICINE

## 2018-03-25 RX ORDER — LEVOFLOXACIN 500 MG/1
500 TABLET, FILM COATED ORAL EVERY 24 HOURS
Status: DISCONTINUED | OUTPATIENT
Start: 2018-03-25 | End: 2018-03-25 | Stop reason: HOSPADM

## 2018-03-25 RX ORDER — LEVOFLOXACIN 500 MG/1
500 TABLET, FILM COATED ORAL EVERY 24 HOURS
Qty: 6 TAB | Refills: 0 | Status: SHIPPED | OUTPATIENT
Start: 2018-03-25 | End: 2018-03-31

## 2018-03-25 RX ORDER — OXYCODONE AND ACETAMINOPHEN 5; 325 MG/1; MG/1
2 TABLET ORAL
Qty: 12 TAB | Refills: 0 | Status: SHIPPED | OUTPATIENT
Start: 2018-03-25 | End: 2018-05-10

## 2018-03-25 RX ADMIN — SODIUM CHLORIDE 50 ML/HR: 900 INJECTION, SOLUTION INTRAVENOUS at 06:20

## 2018-03-25 RX ADMIN — Medication 1 CAPSULE: at 09:03

## 2018-03-25 RX ADMIN — LEVOFLOXACIN 500 MG: 500 TABLET, FILM COATED ORAL at 12:42

## 2018-03-25 RX ADMIN — OXYCODONE HYDROCHLORIDE AND ACETAMINOPHEN 2 TABLET: 5; 325 TABLET ORAL at 12:22

## 2018-03-25 RX ADMIN — KETOROLAC TROMETHAMINE 15 MG: 30 INJECTION, SOLUTION INTRAMUSCULAR at 06:13

## 2018-03-25 NOTE — DISCHARGE INSTRUCTIONS
HOSPITALIST DISCHARGE INSTRUCTIONS    NAME: Chucho Riggins   :  1985   MRN:  [id number]     Date/Time:  3/25/2018 11:58 AM    ADMIT DATE: 3/22/2018   DISCHARGE DATE: 3/25/2018         · It is important that you take the medication exactly as they are prescribed. · Keep your medication in the bottles provided by the pharmacist and keep a list of the medication names, dosages, and times to be taken in your wallet. · Do not take other medications without consulting your doctor. What to do at Home    Recommended diet:  Regular Diet    Recommended activity: Activity as tolerated      If you have questions regarding the hospital related prescriptions or hospital related issues please call [facility] at [phone]. You can always direct your questions to your primary care doctor if you are unable to reach your hospital physician; your PCP works as an extension of your hospital doctor just like your hospital doctor is an extension of your PCP for your time at the hospital [facility], [facility])    If you experience any of the following symptoms then please call your primary care physician or return to the emergency room if you cannot get hold of your doctor:    Fever, chills, nausea, vomiting, or persistent diarrhea  Worsening weakness or new problems with your speech or balance  Dark stools or visible blood in your stools  New Leg swelling or shortness of breath as these could be signs of a clot    Additional Instructions:    Bring these papers with you to your follow up appointments. The papers will help your doctors be sure to continue the care plan from the hospital.      F/u with PCP next week and get CBC to reveal platelets. F/u with hematology in 1-2 weeks        Information obtained by :  I understand that if any problems occur once I am at home I am to contact my physician. I understand and acknowledge receipt of the instructions indicated above. Physician's or R.N.'s Signature                                                                  Date/Time                                                                                                                                              Patient or Representative Signature

## 2018-03-25 NOTE — ROUTINE PROCESS
Bedside shift change report given to TYREE HERNANDEZ (oncoming nurse) by Lisa Delaney RN (offgoing nurse). Report included the following information SBAR, Kardex, Intake/Output, MAR, Accordion, Recent Results and Med Rec Status.

## 2018-03-25 NOTE — PROGRESS NOTES
Reason for Admission:      Sepsis d/t acute pyelonephritis. Non obstructing renal stones. Hot tub folliculitis. Chronic Thrombocytopenia. RRAT Score:      9  Plan for utilizing home health:       n/a  Likelihood of Readmission:       Low    Care Management Interventions  PCP Verified by CM: Yes  Palliative Care Criteria Met (RRAT>21 & CHF Dx)?: No  Mode of Transport at Discharge: Other (see comment)  Transition of Care Consult (CM Consult): Discharge Planning  MyChart Signup: No  Discharge Durable Medical Equipment: No  Physical Therapy Consult: No  Occupational Therapy Consult: No  Speech Therapy Consult: No  Current Support Network: Lives with Spouse, Own Home  Confirm Follow Up Transport: Self  Plan discussed with Pt/Family/Caregiver: Yes  Freedom of Choice Offered: Yes  Discharge Location  Discharge Placement: Home    No needs.      Prince, Penelope CM   Ext [phone]

## 2018-03-25 NOTE — DISCHARGE SUMMARY
Hospitalist Discharge Summary     Patient ID:  Jess Gaines  [id number]  29 y.o.  1985    PCP on record: Jean Carlos Madison NP    Admit date: 3/22/2018  Discharge date and time: 3/25/2018      DISCHARGE DIAGNOSIS:    Sepsis 2nd to E coli UTI POA  non obstructing renal stones  Acute on chronic thrombocytopenia- needs further f/u by heme  Acute folliculitis  depression    CONSULTATIONS:  IP CONSULT TO HOSPITALIST    Excerpted HPI from H&P of Aparna Tirado MD:  CHIEF COMPLAINT:   Flank pain     HISTORY OF PRESENT ILLNESS:     Ayesha Sanford is a 29 y.o.  female with a prior history of pyelonephritis who was referred to the ER from [facility].   Patient initially presented for right flank pain, urinary pressure, nausea and chills x 4 days. ER evaluation remarkable for UA with >100wbc, leukocytosis (15K), thrombocytopenia and CT abdomen showing findings consistent with acute pyelonephritis. Patient also noted a follicular type rash over her chest wall. She and her  came back from vacation and used a hot tube last weekend. She read up on hot tub folliculitis and was concerned that she now has it.       We were asked to admit for work up and evaluation of the above problems.        ______________________________________________________________________  DISCHARGE SUMMARY/HOSPITAL COURSE:  for full details see H&P, daily progress notes, labs, consult notes. Sepsis due to acute pyelonephritis   E Coli UTI  Non obstructing renal stones  -CT abdomen Findings in the right kidney upper pole consistent with pyelonephritis. No  abscess is identified. The possibility of renal vascular etiology (infarct) is thought to be less likely. Bilateral renal calculi which are nonobstructing  -continue Levaquin. Urine cx e. Coli pansensitive.  Blood cx NGTD  tolerating po, stop IVFs  -cont probiotics for 2 weeks while on abx  -due to migration of pain to RUQ, cmp and lipase remaion normal, US neg  -WBC normalized, no further fever        Hot tub folliculitis  -exposure last weekend.   -cont Levaquin for now, rash better      Chronic thrombocytopenia  -follows with Dr Zack Hoyos and per recent evaluation felt she might have ITP and recommended follow up in 3 months  -plts 50s, only slightly down from yesterday, likely worsened by sepsis, stop lovenox, asked to repeat labs next week with pcp and f/u with heme for further w/u    Depression  -continue effexor        Recommended Disposition: Home w/Family        Patient will be discharged home today. I have asked her to follow-up with her primary care physician next week and get follow-up CBC at that time to make sure her platelets remained stable. I have also asked her to follow-up with the hematologist that she saw last week prior to this illness since her platelets have been dropping during this day which is likely related to her acute infection but she has a history of thrombocytopenia in the past. I have given her some Percocet for pain as needed and encouraged her to wean herself down from the doses and not to take it if she does not need it for pain. I would expect that her pain would gradually improve but if it worsens or she develops fever, she will need to return for reevaluation and possible repeat CT scan.      _______________________________________________________________________  Patient seen and examined by me on discharge day. Pertinent Findings:    Patient feels much better. Her pain has improved and her appetite is improving. Still a little bit of right sided pain but the Percocet worked fine for her she has not required any more IV pain medicine. She is eager to go home today. Patient is awake and alert oriented ×3 well-appearing playing with her children in the bed.  Sclera nonicteric on September pink oropharynx mucous members moist and pink cardiovascular regular rate lungs are clear abdomen some mild right upper quadrant tenderness no rebound or guarding no CVA tenderness extremities no clubbing cyanosis or edema_______________________________________________________________________  DISCHARGE MEDICATIONS:   Current Discharge Medication List      START taking these medications    Details   L. acidoph & paracasei- S therm- Bifido (BRENDAN-Q/RISAQUAD) 8 billion cell cap cap Take 1 Cap by mouth daily for 14 days. Qty: 14 Cap, Refills: 0      levoFLOXacin (LEVAQUIN) 500 mg tablet Take 1 Tab by mouth every twenty-four (24) hours for 6 days. Qty: 6 Tab, Refills: 0      oxyCODONE-acetaminophen (PERCOCET) 5-325 mg per tablet Take 2 Tabs by mouth every six (6) hours as needed for up to 12 doses. Max Daily Amount: 8 Tabs. Qty: 12 Tab, Refills: 0    Associated Diagnoses: Acute right flank pain         CONTINUE these medications which have NOT CHANGED    Details   acetaminophen (TYLENOL EXTRA STRENGTH) 500 mg tablet Take 1,000 mg by mouth every six (6) hours as needed for Pain. venlafaxine-SR (EFFEXOR-XR) 75 mg capsule Take 1 Cap by mouth daily. Qty: 30 Cap, Refills: 1    Associated Diagnoses: Moderate episode of recurrent major depressive disorder (HCC)             My Recommended Diet, Activity, Wound Care, and follow-up labs are listed in the patient's Discharge Insturctions which I have personally completed and reviewed.     ______________________________________________________________________    Risk of deterioration: Low    Condition at Discharge:  Stable  ______________________________________________________________________    Disposition  Home with family, no needs  ______________________________________________________________________    Care Plan discussed with:   Patient, Family, RN    ______________________________________________________________________    Code Status: Full Code  ______________________________________________________________________      Follow up with:   PCP : Jakub Kwok NP  Follow-up Information     Follow up With Details Comments Contact Info    Stevie Nieto NP   [phone]  [phone]      Miguel Merino MD In 1 week  [address] [facility]  [address] [facility], NP In 3 days  [address] [facility]  [phone]                Total time in minutes spent coordinating this discharge (includes going over instructions, follow-up, prescriptions, and preparing report for sign off to her PCP) :  35 minutes    Signed:  Celso Crews MD

## 2018-03-25 NOTE — PROGRESS NOTES
Reason for Admission:      Sepsis d/t acute pyelonephritis. Non obstructing renal stones. Hot tub folliculitis. Chronic Thrombocytopenia. RRAT Score:      9  Plan for utilizing home health:       n/a  Likelihood of Readmission:       Low    Care Management Interventions  PCP Verified by CM: Yes  Palliative Care Criteria Met (RRAT>21 & CHF Dx)?: No  Mode of Transport at Discharge: Other (see comment)  Transition of Care Consult (CM Consult): Discharge Planning  MyChart Signup: No  Discharge Durable Medical Equipment: No  Physical Therapy Consult: No  Occupational Therapy Consult: No  Speech Therapy Consult: No  Current Support Network: Lives with Spouse, Own Home  Confirm Follow Up Transport: Self  Plan discussed with Pt/Family/Caregiver: Yes  Freedom of Choice Offered: Yes  Discharge Location  Discharge Placement: Home    No needs.      Prince, Penelope CM   Ext [phone]

## 2018-03-26 ENCOUNTER — TELEPHONE (OUTPATIENT)
Dept: FAMILY MEDICINE CLINIC | Age: 33
End: 2018-03-26

## 2018-03-26 NOTE — TELEPHONE ENCOUNTER
----- Message from Aramis Pacheco NP sent at 3/26/2018  7:33 AM EDT -----  Regarding: hospital follow-up  Patient needs hospital f/u appt this week. Please schedule. OK to use SDS slot if that's all that's available.   Thanks -  CAB

## 2018-03-26 NOTE — TELEPHONE ENCOUNTER
Message left on patient voice mail requesting a return call for patient to contact office for follow up on UTI at ER visit. If no availability psr is to page nurse for work in [facility].

## 2018-03-27 ENCOUNTER — OFFICE VISIT (OUTPATIENT)
Dept: FAMILY MEDICINE CLINIC | Age: 33
End: 2018-03-27

## 2018-03-27 VITALS
OXYGEN SATURATION: 98 % | WEIGHT: 177.2 LBS | HEIGHT: 67 IN | HEART RATE: 62 BPM | TEMPERATURE: 97.5 F | SYSTOLIC BLOOD PRESSURE: 123 MMHG | RESPIRATION RATE: 16 BRPM | BODY MASS INDEX: 27.81 KG/M2 | DIASTOLIC BLOOD PRESSURE: 83 MMHG

## 2018-03-27 DIAGNOSIS — D69.6 THROMBOCYTOPENIA (HCC): ICD-10-CM

## 2018-03-27 DIAGNOSIS — N10 ACUTE PYELONEPHRITIS: Primary | ICD-10-CM

## 2018-03-27 LAB
BACTERIA SPEC CULT: NORMAL
BILIRUB UR QL STRIP: NEGATIVE
GLUCOSE UR-MCNC: NEGATIVE MG/DL
KETONES P FAST UR STRIP-MCNC: NEGATIVE MG/DL
PH UR STRIP: 7.5 [PH] (ref 4.6–8)
PROT UR QL STRIP: NEGATIVE
SERVICE CMNT-IMP: NORMAL
SP GR UR STRIP: 1.02 (ref 1–1.03)
UA UROBILINOGEN AMB POC: NORMAL (ref 0.2–1)
URINALYSIS CLARITY POC: CLEAR
URINALYSIS COLOR POC: YELLOW
URINE BLOOD POC: NEGATIVE
URINE LEUKOCYTES POC: NEGATIVE
URINE NITRITES POC: NEGATIVE

## 2018-03-27 RX ORDER — ONDANSETRON 4 MG/1
4 TABLET, ORALLY DISINTEGRATING ORAL
Qty: 12 TAB | Refills: 0 | Status: SHIPPED | OUTPATIENT
Start: 2018-03-27 | End: 2018-05-10

## 2018-03-27 RX ORDER — NORETHINDRONE ACETATE AND ETHINYL ESTRADIOL AND FERROUS FUMARATE 1MG-20(24)
KIT ORAL
Refills: 1 | COMMUNITY
Start: 2018-03-22 | End: 2018-05-10 | Stop reason: DRUGHIGH

## 2018-03-27 RX ORDER — ALPRAZOLAM 0.5 MG/1
TABLET ORAL
Refills: 0 | COMMUNITY
Start: 2018-02-16 | End: 2018-03-27

## 2018-03-27 NOTE — PROGRESS NOTES
HISTORY OF PRESENT ILLNESS  Ephraim Patton is a 29 y.o. female. HPI    Pt here for [facility] appt after being hospitalized for pyelonephritis (and hot tub folliculitis) from 5/65 - 3/25. Hospital course records reviewed today. Still having some nausea. Having some headaches, which started while she was hospitalized. Taking levaquin; has 3 days left. Has not been needing percocet; took 1 two days ago, but has not needed since. Still feeling somewhat weakened, but overall doing well. No further fevers, dysuria, back pain. While hospitalized, PLT dropped to 50, and repeat was recommended within 3 days. Visit Vitals    /83 (BP 1 Location: Right arm, BP Patient Position: Sitting)    Pulse 62    Temp 97.5 °F (36.4 °C) (Oral)    Resp 16    Ht 5' 7\" (1.702 m)    Wt 177 lb 3.2 oz (80.4 kg)    LMP 03/10/2018 (Exact Date)    SpO2 98%    BMI 27.75 kg/m2     Current Outpatient Prescriptions on File Prior to Visit   Medication Sig Dispense Refill    L. acidoph & paracasei- S therm- Bifido (BRENDAN-Q/RISAQUAD) 8 billion cell cap cap Take 1 Cap by mouth daily for 14 days. 14 Cap 0    levoFLOXacin (LEVAQUIN) 500 mg tablet Take 1 Tab by mouth every twenty-four (24) hours for 6 days. 6 Tab 0    oxyCODONE-acetaminophen (PERCOCET) 5-325 mg per tablet Take 2 Tabs by mouth every six (6) hours as needed for up to 12 doses. Max Daily Amount: 8 Tabs. 12 Tab 0    acetaminophen (TYLENOL EXTRA STRENGTH) 500 mg tablet Take 1,000 mg by mouth every six (6) hours as needed for Pain.  venlafaxine-SR (EFFEXOR-XR) 75 mg capsule Take 1 Cap by mouth daily. 30 Cap 1     No current facility-administered medications on file prior to visit. Review of Systems   Constitutional: Negative for chills, fever and malaise/fatigue. Eyes: Negative for blurred vision and double vision. Respiratory: Negative for cough and shortness of breath. Cardiovascular: Negative for chest pain, palpitations, orthopnea and leg swelling. Gastrointestinal: Positive for nausea. Negative for abdominal pain, blood in stool and vomiting. Genitourinary: Negative for dysuria, flank pain, frequency, hematuria and urgency. Neurological: Positive for headaches. Negative for dizziness, tingling, sensory change and focal weakness. Physical Exam   Constitutional: She is oriented to person, place, and time. She appears well-developed and well-nourished. No distress. HENT:   Head: Normocephalic and atraumatic. Cardiovascular: Normal rate, regular rhythm and normal heart sounds. Pulmonary/Chest: Effort normal and breath sounds normal. No respiratory distress. She has no wheezes. Abdominal: There is no CVA tenderness. Neurological: She is alert and oriented to person, place, and time. Skin: Skin is warm and dry. She is not diaphoretic. Psychiatric: She has a normal mood and affect. Her behavior is normal. Judgment normal.   Nursing note and vitals reviewed. ASSESSMENT and PLAN    ICD-10-CM ICD-9-CM    1. Acute pyelonephritis N10 590.10 AMB POC URINALYSIS DIP STICK AUTO W/O MICRO      ondansetron (ZOFRAN ODT) 4 mg disintegrating tablet   2. Thrombocytopenia (HCC) D69.6 287.5 CBC WITH AUTOMATED DIFF     Normal UA today; symptoms have largely resolved. I suspect that the headaches are a side effect from the levaquin; if they persist longer than a few days after completing the course of levaquin, I have asked the patient to contact me. Otherwise, continue current plan of care. Patient plans to return to work tomorrow, which I think will be fine. Will repeat CBC today to ensure that PLT count is stable and hopefully improving. Saw heme-onc last week, with recommendation to return in 3 months. Return in ~ 1 month for medication follow-up, sooner if needed or if indicated based on lab results. Follow-up Disposition:  Return in about 6 weeks (around 5/8/2018) for medication follow-up.    Abdulaziz Tucker NP

## 2018-03-27 NOTE — MR AVS SNAPSHOT
[facility] 
 
 
 [address] [facility] [address] [id number] 
[phone] Patient: Fernanda Simeon MRN: [id number] :1985 Visit Information Date & Time Provider Department Dept. Phone Encounter #  
 3/27/2018  9:30 AM Kadeem Marlow NP [facility] [phone] [id number] Follow-up Instructions Return in about 6 weeks (around 2018) for medication follow-up. Your Appointments 2018  3:30 PM  
Any with [facility]em [facility]low [facility] Appt Note: follow up on [facility]  
 [address] [address] [id number] [phone] [address] [facility] [address] Upcoming Health Maintenance Date Due  
 PAP AKA CERVICAL CYTOLOGY 2008 DTaP/Tdap/Td series (2 - Td) 2027 Allergies as of 3/27/2018  Review Complete On: 3/27/2018 By: Kadeem Marlow NP Severity Noted Reaction Type Reactions Morphine  10/09/2015    Hives Current Immunizations  Never Reviewed No immunizations on file. Not reviewed this visit You Were Diagnosed With   
  
 Codes Comments Acute pyelonephritis    -  Primary ICD-10-CM: N10 
ICD-9-CM: 590.10 Thrombocytopenia ([facility] [email or web address]]ca 75.)     ICD-10-CM: D69.6 ICD-9-CM: 287.5 Vitals BP Pulse Temp Resp Height(growth percentile) Weight(growth percentile) 123/83 (BP 1 Location: Right arm, BP Patient Position: Sitting) 62 97.5 °F (36.4 °C) (Oral) 16 5' 7\" (1.702 m) 177 lb 3.2 oz (80.4 kg) LMP SpO2 BMI OB Status Smoking Status 03/10/2018 (Exact Date) 98% 27.75 kg/m2 Having regular periods Never Smoker Vitals History BMI and BSA Data Body Mass Index Body Surface Area  
 27.75 kg/m 2 1.95 m 2 Preferred Pharmacy Pharmacy Name Phone [id number] [address]  Lana Wray [phone] Your Updated Medication List  
  
 This list is accurate as of 3/27/18 10:06 AM.  Always use your most recent med list.  
  
  
  
  
 JUNEL FE 24 1 mg-20 mcg (24)/75 mg (4) Tab Generic drug:  norethindrone-e estradiol-iron  
take 1 tablet by mouth once daily L. acidoph & paracasei- S therm- Bifido 8 billion cell Cap cap Commonly known as:  BRENDAN-Q/RISAQUAD Take 1 Cap by mouth daily for 14 days. levoFLOXacin 500 mg tablet Commonly known as:  Naaman Delude Take 1 Tab by mouth every twenty-four (24) hours for 6 days. ondansetron 4 mg disintegrating tablet Commonly known as:  ZOFRAN ODT Take 1 Tab by mouth every eight (8) hours as needed for Nausea. oxyCODONE-acetaminophen 5-325 mg per tablet Commonly known as:  PERCOCET Take 2 Tabs by mouth every six (6) hours as needed for up to 12 doses. Max Daily Amount: 8 Tabs. TYLENOL EXTRA STRENGTH 500 mg tablet Generic drug:  acetaminophen Take 1,000 mg by mouth every six (6) hours as needed for Pain. venlafaxine-SR 75 mg capsule Commonly known as:  EFFEXOR-XR Take 1 Cap by mouth daily. Prescriptions Sent to Pharmacy Refills  
 ondansetron (ZOFRAN ODT) 4 mg disintegrating tablet 0 Sig: Take 1 Tab by mouth every eight (8) hours as needed for Nausea. Class: Normal  
 [facility] [address] #: [phone] Route: Oral  
  
We Performed the Following AMB POC URINALYSIS DIP STICK AUTO W/O MICRO [80485 CPT(R)] CBC WITH AUTOMATED DIFF [97220 CPT(R)] Follow-up Instructions Return in about 6 weeks (around 5/8/2018) for medication follow-up. Patient Instructions Kidney Infection: Care Instructions Your Care Instructions A kidney infection (pyelonephritis) is a type of urinary tract infection, or UTI. Most UTIs are bladder infections. Kidney infections tend to make people much sicker than bladder infections do.  A kidney infection is also more serious because it can cause lasting damage if it is not treated quickly. Follow-up care is a key part of your treatment and safety. Be sure to make and go to all appointments, and call your doctor if you are having problems. It's also a good idea to know your test results and keep a list of the medicines you take. How can you care for yourself at home? · Take your antibiotics as directed. Do not stop taking them just because you feel better. You need to take the full course of antibiotics. · Drink plenty of water, enough so that your urine is light yellow or clear like water. This may help wash out bacteria that are causing the infection. If you have kidney, heart, or liver disease and have to limit fluids, talk with your doctor before you increase the amount of fluids you drink. · Urinate often. Try to empty your bladder each time. · To relieve pain, take a hot shower or lay a heating pad (set on low) over your lower belly. Never go to sleep with a heating pad in place. Put a thin cloth between the heating pad and your skin. To help prevent kidney infections · Drink plenty of water each day. This helps you urinate often, which clears bacteria from your system. If you have kidney, heart, or liver disease and have to limit fluids, talk with your doctor before you increase the amount of fluids you drink. · Urinate when you have the urge. Do not hold your urine for a long time. Urinate before you go to sleep. · If you have symptoms of a bladder infection, such as burning when you urinate or having to urinate often, call your doctor so you can treat the problem before it gets worse. If you do not treat a bladder infection quickly, it can spread to the kidney. · Men should keep the tip of the penis clean. If you are a woman, keep these ideas in mind: · Urinate right after you have sex. · Change sanitary pads often.  Avoid douches, feminine hygiene sprays, and other feminine hygiene products that have deodorants. · After going to the bathroom, wipe from front to back. When should you call for help? Call your doctor now or seek immediate medical care if: 
? · You have symptoms that a kidney infection is getting worse. These may include: 
¨ Pain or burning when you urinate. ¨ A frequent need to urinate without being able to pass much urine. ¨ Pain in the flank, which is just below the rib cage and above the waist on either side of the back. ¨ Blood in the urine. ¨ A fever. ? · You are vomiting or nauseated. ? Watch closely for changes in your health, and be sure to contact your doctor if: 
? · You do not get better as expected. Where can you learn more? Go to http://danna-rupesh.info/. Enter N469 in the search box to learn more about \"Kidney Infection: Care Instructions. \" Current as of: May 12, 2017 Content Version: 11.4 © 2100-5148 [employer]. Care instructions adapted under license by [employer] (which disclaims liability or warranty for this information). If you have questions about a medical condition or this instruction, always ask your healthcare professional. [place] [address] any warranty or liability for your use of this information. Introducing hospitals & HEALTH SERVICES! Bunny Cannon introduces [facility] patient portal. Now you can access parts of your medical record, email your doctor's office, and request medication refills online. 1. In your internet browser, go to [email or web address] 2. Click on the First Time User? Click Here link in the Sign In box. You will see the New Member Sign Up page. 3. Enter your [facility] Access Code exactly as it appears below. You will not need to use this code after youve completed the sign-up process. If you do not sign up before the expiration date, you must request a new code.  
 
· [facility] Access Code: [email or web address] 
 Expires: 5/17/2018 12:18 PM 
 
4. Enter the last four digits of your Social Security Number (xxxx) and Date of Birth (mm/dd/yyyy) as indicated and click Submit. You will be taken to the next sign-up page. 5. Create a [employer] ID. This will be your [employer] login ID and cannot be changed, so think of one that is secure and easy to remember. 6. Create a [employer] password. You can change your password at any time. 7. Enter your Password Reset Question and Answer. This can be used at a later time if you forget your password. 8. Enter your e-mail address. You will receive e-mail notification when new information is available in [address] 9. Click Sign Up. You can now view and download portions of your medical record. 10. Click the Download Summary menu link to download a portable copy of your medical information. If you have questions, please visit the Frequently Asked Questions section of the [employer] website. Remember, [employer] is NOT to be used for urgent needs. For medical emergencies, dial 911. Now available from your iPhone and Android! Please provide this summary of care documentation to your next provider. Your primary care clinician is listed as Suma Gaffney. If you have any questions after today's visit, please call [phone].

## 2018-03-27 NOTE — PATIENT INSTRUCTIONS
Kidney Infection: Care Instructions  Your Care Instructions    A kidney infection (pyelonephritis) is a type of urinary tract infection, or UTI. Most UTIs are bladder infections. Kidney infections tend to make people much sicker than bladder infections do. A kidney infection is also more serious because it can cause lasting damage if it is not treated quickly. Follow-up care is a key part of your treatment and safety. Be sure to make and go to all appointments, and call your doctor if you are having problems. It's also a good idea to know your test results and keep a list of the medicines you take. How can you care for yourself at home? · Take your antibiotics as directed. Do not stop taking them just because you feel better. You need to take the full course of antibiotics. · Drink plenty of water, enough so that your urine is light yellow or clear like water. This may help wash out bacteria that are causing the infection. If you have kidney, heart, or liver disease and have to limit fluids, talk with your doctor before you increase the amount of fluids you drink. · Urinate often. Try to empty your bladder each time. · To relieve pain, take a hot shower or lay a heating pad (set on low) over your lower belly. Never go to sleep with a heating pad in place. Put a thin cloth between the heating pad and your skin. To help prevent kidney infections  · Drink plenty of water each day. This helps you urinate often, which clears bacteria from your system. If you have kidney, heart, or liver disease and have to limit fluids, talk with your doctor before you increase the amount of fluids you drink. · Urinate when you have the urge. Do not hold your urine for a long time. Urinate before you go to sleep. · If you have symptoms of a bladder infection, such as burning when you urinate or having to urinate often, call your doctor so you can treat the problem before it gets worse.  If you do not treat a bladder infection quickly, it can spread to the kidney. · Men should keep the tip of the penis clean. If you are a woman, keep these ideas in mind:  · Urinate right after you have sex. · Change sanitary pads often. Avoid douches, feminine hygiene sprays, and other feminine hygiene products that have deodorants. · After going to the bathroom, wipe from front to back. When should you call for help? Call your doctor now or seek immediate medical care if:  ? · You have symptoms that a kidney infection is getting worse. These may include:  ¨ Pain or burning when you urinate. ¨ A frequent need to urinate without being able to pass much urine. ¨ Pain in the flank, which is just below the rib cage and above the waist on either side of the back. ¨ Blood in the urine. ¨ A fever. ? · You are vomiting or nauseated. ? Watch closely for changes in your health, and be sure to contact your doctor if:  ? · You do not get better as expected. Where can you learn more? Go to http://danna-rupesh.info/. Enter M453 in the search box to learn more about \"Kidney Infection: Care Instructions. \"  Current as of: May 12, 2017  Content Version: 11.4  © 6473-2114 [employer]. Care instructions adapted under license by [employer] (which disclaims liability or warranty for this information). If you have questions about a medical condition or this instruction, always ask your healthcare professional. [place] [address] any warranty or liability for your use of this information.

## 2018-03-27 NOTE — PROGRESS NOTES
Chief Complaint   Patient presents with   [facility] Follow Up     ED [facility] 3/22/2018- 3/25/2018 for pylenephritis    Headache     pt c/o headaches that started when she was in the hospital     Patient states she has a headache at the present time and rates 3/10. 1. Have you been to the ER, urgent care clinic since your last visit? Hospitalized since your last visit? Yes, see note above    2. Have you seen or consulted any other health care providers outside of the [address] [place] [address] since your last visit? Include any pap smears or colon screening.  No

## 2018-03-28 LAB
BASOPHILS # BLD AUTO: 0 X10E3/UL (ref 0–0.2)
BASOPHILS NFR BLD AUTO: 1 %
EOSINOPHIL # BLD AUTO: 0.3 X10E3/UL (ref 0–0.4)
EOSINOPHIL NFR BLD AUTO: 5 %
ERYTHROCYTE [DISTWIDTH] IN BLOOD BY AUTOMATED COUNT: 13.5 % (ref 12.3–15.4)
HCT VFR BLD AUTO: 37.4 % (ref 34–46.6)
HGB BLD-MCNC: 12.3 G/DL (ref 11.1–15.9)
IMM GRANULOCYTES # BLD: 0 X10E3/UL (ref 0–0.1)
IMM GRANULOCYTES NFR BLD: 0 %
LYMPHOCYTES # BLD AUTO: 1.3 X10E3/UL (ref 0.7–3.1)
LYMPHOCYTES NFR BLD AUTO: 21 %
MCH RBC QN AUTO: 31 PG (ref 26.6–33)
MCHC RBC AUTO-ENTMCNC: 32.9 G/DL (ref 31.5–35.7)
MCV RBC AUTO: 94 FL (ref 79–97)
MONOCYTES # BLD AUTO: 0.5 X10E3/UL (ref 0.1–0.9)
MONOCYTES NFR BLD AUTO: 8 %
NEUTROPHILS # BLD AUTO: 4 X10E3/UL (ref 1.4–7)
NEUTROPHILS NFR BLD AUTO: 65 %
PLATELET # BLD AUTO: 123 X10E3/UL (ref 150–379)
RBC # BLD AUTO: 3.97 X10E6/UL (ref 3.77–5.28)
WBC # BLD AUTO: 6.1 X10E3/UL (ref 3.4–10.8)

## 2018-03-29 ENCOUNTER — TELEPHONE (OUTPATIENT)
Dept: FAMILY MEDICINE CLINIC | Age: 33
End: 2018-03-29

## 2018-03-29 NOTE — TELEPHONE ENCOUNTER
----- Message from Claribel Shirley NP sent at 3/28/2018  2:07 PM EDT -----  Please let Winsome know that her platelets are rebounding nicely; they are up to 123! Thanks!   CAB

## 2018-05-10 ENCOUNTER — OFFICE VISIT (OUTPATIENT)
Dept: FAMILY MEDICINE CLINIC | Age: 33
End: 2018-05-10

## 2018-05-10 VITALS
DIASTOLIC BLOOD PRESSURE: 80 MMHG | BODY MASS INDEX: 26.53 KG/M2 | RESPIRATION RATE: 16 BRPM | SYSTOLIC BLOOD PRESSURE: 110 MMHG | HEART RATE: 100 BPM | HEIGHT: 67 IN | WEIGHT: 169 LBS | TEMPERATURE: 98.9 F | OXYGEN SATURATION: 97 %

## 2018-05-10 DIAGNOSIS — F33.1 MODERATE EPISODE OF RECURRENT MAJOR DEPRESSIVE DISORDER (HCC): ICD-10-CM

## 2018-05-10 DIAGNOSIS — N94.6 DYSMENORRHEA: Primary | ICD-10-CM

## 2018-05-10 LAB
HCG URINE, QL. (POC): NEGATIVE
VALID INTERNAL CONTROL?: YES

## 2018-05-10 RX ORDER — ACETAMINOPHEN 500 MG
TABLET ORAL
COMMUNITY
End: 2018-05-10

## 2018-05-10 RX ORDER — LANOLIN ALCOHOL/MO/W.PET/CERES
CREAM (GRAM) TOPICAL
COMMUNITY
End: 2018-05-10

## 2018-05-10 RX ORDER — DICYCLOMINE HYDROCHLORIDE 10 MG/1
CAPSULE ORAL
COMMUNITY
End: 2018-05-10

## 2018-05-10 RX ORDER — DOXYLAMINE SUCCINATE AND PYRIDOXINE HYDROCHLORIDE, DELAYED RELEASE TABLETS 10 MG/10 MG 10; 10 MG/1; MG/1
TABLET, DELAYED RELEASE ORAL
COMMUNITY
End: 2018-05-10

## 2018-05-10 RX ORDER — CEPHALEXIN 250 MG/1
CAPSULE ORAL
COMMUNITY
End: 2018-05-10 | Stop reason: ALTCHOICE

## 2018-05-10 RX ORDER — TERCONAZOLE 4 MG/G
CREAM VAGINAL
COMMUNITY
End: 2018-05-10

## 2018-05-10 RX ORDER — BUPROPION HYDROCHLORIDE 150 MG/1
150 TABLET ORAL
Qty: 30 TAB | Refills: 2 | Status: SHIPPED | OUTPATIENT
Start: 2018-05-10 | End: 2018-06-21 | Stop reason: SDUPTHER

## 2018-05-10 RX ORDER — FLUCONAZOLE 150 MG/1
TABLET ORAL
COMMUNITY
End: 2018-05-10 | Stop reason: ALTCHOICE

## 2018-05-10 RX ORDER — HYDROCODONE BITARTRATE AND ACETAMINOPHEN 5; 325 MG/1; MG/1
TABLET ORAL
COMMUNITY
End: 2018-05-10

## 2018-05-10 RX ORDER — ACETAMINOPHEN AND CODEINE PHOSPHATE 120; 12 MG/5ML; MG/5ML
SOLUTION ORAL
COMMUNITY
End: 2018-05-10

## 2018-05-10 RX ORDER — NORETHINDRONE ACETATE AND ETHINYL ESTRADIOL 1.5-30(21)
1 KIT ORAL DAILY
Qty: 1 PACKAGE | Refills: 3 | Status: SHIPPED | OUTPATIENT
Start: 2018-05-10 | End: 2018-08-21 | Stop reason: SDUPTHER

## 2018-05-10 RX ORDER — ONDANSETRON 4 MG/1
TABLET, ORALLY DISINTEGRATING ORAL
COMMUNITY
End: 2018-05-10

## 2018-05-10 RX ORDER — ALPRAZOLAM 0.5 MG/1
0.5 TABLET ORAL
Qty: 10 TAB | Refills: 0 | Status: SHIPPED | OUTPATIENT
Start: 2018-05-10 | End: 2018-10-23

## 2018-05-10 RX ORDER — VITAMIN E 1000 UNIT
CAPSULE ORAL
COMMUNITY
End: 2018-05-10

## 2018-05-10 RX ORDER — CIPROFLOXACIN 250 MG/1
TABLET, FILM COATED ORAL
COMMUNITY
End: 2018-05-10 | Stop reason: ALTCHOICE

## 2018-05-10 RX ORDER — METRONIDAZOLE 500 MG/1
TABLET ORAL
COMMUNITY
End: 2018-05-10 | Stop reason: ALTCHOICE

## 2018-05-10 RX ORDER — METRONIDAZOLE 7.5 MG/G
GEL VAGINAL
COMMUNITY
End: 2018-05-10

## 2018-05-10 RX ORDER — FLUCONAZOLE 100 MG/1
TABLET ORAL
COMMUNITY
End: 2018-05-10 | Stop reason: ALTCHOICE

## 2018-05-10 RX ORDER — TRAZODONE HYDROCHLORIDE 50 MG/1
50 TABLET ORAL
Qty: 30 TAB | Refills: 2 | Status: SHIPPED | OUTPATIENT
Start: 2018-05-10 | End: 2018-06-21 | Stop reason: SDUPTHER

## 2018-05-10 RX ORDER — CYCLOBENZAPRINE HCL 5 MG
TABLET ORAL
COMMUNITY
End: 2018-05-10

## 2018-05-10 RX ORDER — VALACYCLOVIR HYDROCHLORIDE 1 G/1
TABLET, FILM COATED ORAL
COMMUNITY
End: 2018-05-10

## 2018-05-10 RX ORDER — ALPRAZOLAM 0.5 MG/1
0.5 TABLET ORAL
COMMUNITY
End: 2018-05-10 | Stop reason: SDUPTHER

## 2018-05-10 NOTE — PROGRESS NOTES
HISTORY OF PRESENT ILLNESS  Layla Shore is a 29 y.o. female. HPI  Here for depression follow-up. Works as a nurse at [employer].   CristobalBere Tadeorupertkayli Valdes 39  2/16/18: Karly Goodman like she is having severe post-partum depression/anxiety. Feels like she has no reason to be upset as she has a great life, but it has gotten worse over the past few weeks. Has dealt with it over her whole life, but has never been treated. Last night was especially rough; she has 2 young kids and her  works overnights, and she just laid on the floor crying. She went to the [place] years ago and got vistaril, which didn't help at all, but was on celexa and stopped it when she got pregnant. She thinks the celexa helped, though it wasn't that long that she was on it. Did counseling for a little while, but found excuses not to go. Wants to be 100% for her kids. Update 3/13/18: Was started on effexor 37.5mg once daily at her last visit one month ago, and given #12 xanax for as-needed anxiety. Has taken the xanax only 3x since her last visit about a month ago; took it when she was feeling overwhelmed and head was spinning. Does not think it has been that effective when she's taken the xanax; wondering about the dose. Thinks that the effexor is helping. Taking at night. Sleeping well. Feeling like she is less emotionally labile. Interested in increasing the dose. PHQ-9 score of 6 today; FERNANDO score of 5. Reports that her  thinks that she has become less emotional since starting the medication. Update 5/10/18: Stopped the effexor on her own (cold-turkey) about 2 weeks ago, as she felt like she developed no sex drive while on it. The sex drive has come back since stopping the medication (this was not an issue on the lower dose, per patient). However, now she feels like her anxiety/depression is much worse, and she is not sleeping well. PHQ-9 score of 6 today, FERNANDO score of 14.  Her  has found her to be more irritable. Thinks she needs to be on something, but isn't sure what. Has been taking a few xanax as needed, still from her first prescription in February 2018.      Thrombocytopenia - Hx of this, got worse during pregnancy. Saw heme-onc at [facility], but never got any follow-up set up there and isn't sure what the plan was. Saw Dr. Shi Madera, and plan is to monitor at this point.      Hx HSV  Has been told that her bloodwork was positive for herpes, but has never had an outbreak.      Current Specialists:  1. [address] - OB/GYN; last visit summer 2017 (not planning to return)     Preventative Care  TDaP: May 2017  Pap smear: July 2017, per patient report; was normal after history of abnormal pap smear  Mammogram: has never had  Colonoscopy: has never had  Denies early family hx of breast or colon CA.      Healthy Habits  Patient is exercising 3 - 4x per week, running, and active with her kids. Has lost about 8 lbs since her last visit by exercising more and watching her diet. Patient endorses healthy diet; avoids fatty/greasy foods, sugar-sweetened beverages. Denies tobacco use. Social alcohol use. Denies illicit drug use. Sexually active with 1 male partner in last 12 months.  has a vasectomy; denies concerns for STIs today. Was put on birth control about 2 months ago for dysmenorrhea. However, stopped it after about 4 - 6 weeks because she was having irregular bleeding. Denies missing any pills. Visit Vitals    /80 (BP 1 Location: Left arm, BP Patient Position: Sitting)    Pulse 100    Temp 98.9 °F (37.2 °C) (Oral)    Resp 16    Ht 5' 7\" (1.702 m)    Wt 169 lb (76.7 kg)    LMP 04/16/2018 (Exact Date)    SpO2 97%    BMI 26.47 kg/m2     Current Outpatient Prescriptions on File Prior to Visit   Medication Sig Dispense Refill    acetaminophen (TYLENOL EXTRA STRENGTH) 500 mg tablet Take 1,000 mg by mouth every six (6) hours as needed for Pain.        No current facility-administered medications on file prior to visit. Review of Systems   Constitutional: Negative for chills, fever and malaise/fatigue. Eyes: Negative for blurred vision and double vision. Respiratory: Negative for cough and shortness of breath. Cardiovascular: Negative for chest pain, palpitations, orthopnea and leg swelling. Genitourinary: Negative for dysuria, flank pain, frequency, hematuria and urgency. Neurological: Negative for dizziness and headaches. Psychiatric/Behavioral: Positive for depression. Negative for substance abuse and suicidal ideas. The patient is nervous/anxious and has insomnia. Physical Exam   Constitutional: She is oriented to person, place, and time. She appears well-developed and well-nourished. No distress. HENT:   Head: Normocephalic and atraumatic. Cardiovascular: Normal rate, regular rhythm and normal heart sounds. Pulmonary/Chest: Effort normal and breath sounds normal. No respiratory distress. She has no wheezes. Neurological: She is alert and oriented to person, place, and time. Skin: Skin is warm and dry. She is not diaphoretic. Psychiatric: She has a normal mood and affect. Her behavior is normal. Judgment normal.   Nursing note and vitals reviewed. ASSESSMENT and PLAN    ICD-10-CM ICD-9-CM    1. Dysmenorrhea N94.6 625.3 AMB POC URINE PREGNANCY TEST, VISUAL COLOR COMPARISON      norethindrone-ethinyl estradiol-iron (JUNEL FE 1.5/30, 28,) 1.5 mg-30 mcg (21)/75 mg (7) tab   2. Moderate episode of recurrent major depressive disorder (HCC) F33.1 296.32 buPROPion XL (WELLBUTRIN XL) 150 mg tablet      traZODone (DESYREL) 50 mg tablet      ALPRAZolam (XANAX) 0.5 mg tablet     1. Depression/Anxiety - Start wellbutrin 150mg once daily, along with trazodone 50mg nightly for sleep. Advised patient to contact us if untoward side effects occur, rather than simply stopping the medication on her own.  Could consider Paxil or effexor at lower dose (was effective, per patient, without sexual side effects), though she would rather try wellbutrin today. #10 x 0 0.5mg xanax given today; not planning to continue long-term, and patient is aware of this. 2. Dysmenorrhea - UPT negative today. Discussed with patient that it can take several months for cycles to normalize after starting COCs, so she likely did not give it enough time. However, will increase dose of both progesterone and estrogen in [address], and advised patient to continue medication for AT LEAST 3 months prior to stopping due to BTB. She verbalizes understanding. Return in 6 weeks for CPE with pap smear and medication follow up, sooner PRN. Follow-up Disposition:  Return in about 6 weeks (around 6/21/2018) for CPE with pap smear, med f/u.

## 2018-05-10 NOTE — MR AVS SNAPSHOT
[facility] 
 
 
 [address] [facility] [place], [place] [address] [id number] [phone] Patient: Alex Manning MRN: [id number] :1985 Visit Information Date & Time Provider Department Dept. Phone Encounter #  
 5/10/2018 11:00 AM Adolfo Chavez NP [facility] [phone] [id number] Follow-up Instructions Return in about 6 weeks (around 2018) for CPE with pap smear, med f/u. Upcoming Health Maintenance Date Due  
 PAP AKA CERVICAL CYTOLOGY 2008 Influenza Age 5 to Adult 2018 DTaP/Tdap/Td series (2 - Td) 2027 Allergies as of 5/10/2018  Review Complete On: 5/10/2018 By: Wolf Gerber LPN Severity Noted Reaction Type Reactions Morphine  10/09/2015    Hives Current Immunizations  Never Reviewed No immunizations on file. Not reviewed this visit You Were Diagnosed With   
  
 Codes Comments Dysmenorrhea    -  Primary ICD-10-CM: N94.6 ICD-9-CM: 625.3 Moderate episode of recurrent major depressive disorder (HCC)     ICD-10-CM: F33.1 ICD-9-CM: 296.32 Vitals BP Pulse Temp Resp Height(growth percentile) Weight(growth percentile) 110/80 (BP 1 Location: Left arm, BP Patient Position: Sitting) 100 98.9 °F (37.2 °C) (Oral) 16 5' 7\" (1.702 m) 169 lb (76.7 kg) LMP SpO2 BMI OB Status Smoking Status 2018 (Exact Date) 97% 26.47 kg/m2 Unknown Never Smoker BMI and BSA Data Body Mass Index Body Surface Area  
 26.47 kg/m 2 1.9 m 2 Preferred Pharmacy Pharmacy Name Phone [id number] Gabriella Brar [address] [place] [address] [phone] Your Updated Medication List  
  
   
This list is accurate as of 5/10/18 12:12 PM.  Always use your most recent med list.  
  
  
  
  
 ALPRAZolam 0.5 mg tablet Commonly known as:  John Mcnamara Take 1 Tab by mouth three (3) times daily as needed for Anxiety.  Max Daily Amount: 1.5 mg.  
  
 buPROPion  mg tablet Commonly known as:  Artice January Take 1 Tab by mouth every morning. hydrocortisone acetate 2.5 % rectal cream  
INSERT INTO RECTUM 4 TIMES DAILY PRN  
  
 norethindrone-ethinyl estradiol-iron 1.5 mg-30 mcg (21)/75 mg (7) Tab Commonly known as:  JUNEL FE 1.5/30 (28) Take 1 Tab by mouth daily. traZODone 50 mg tablet Commonly known as:  Mau Ort Take 1 Tab by mouth nightly. TYLENOL EXTRA STRENGTH 500 mg tablet Generic drug:  acetaminophen Take 1,000 mg by mouth every six (6) hours as needed for Pain. Prescriptions Printed Refills ALPRAZolam (XANAX) 0.5 mg tablet 0 Sig: Take 1 Tab by mouth three (3) times daily as needed for Anxiety. Max Daily Amount: 1.5 mg.  
 Class: Print Route: Oral  
  
Prescriptions Sent to Pharmacy Refills buPROPion XL (WELLBUTRIN XL) 150 mg tablet 2 Sig: Take 1 Tab by mouth every morning. Class: Normal  
 Pharmacy: [id number] [address] Ph #: [phone] Route: Oral  
 traZODone (DESYREL) 50 mg tablet 2 Sig: Take 1 Tab by mouth nightly. Class: Normal  
 Pharmacy: [id number] [address] Ph #: [phone] Route: Oral  
 norethindrone-ethinyl estradiol-iron (JUNEL FE 1.5/30, 28,) 1.5 mg-30 mcg (21)/75 mg (7) tab 3 Sig: Take 1 Tab by mouth daily. Class: Normal  
 Pharmacy: [id number] [address] Ph #: [phone] Route: Oral  
 hydrocortisone acetate 2.5 % rectal cream 3 Sig: INSERT INTO RECTUM 4 TIMES DAILY PRN Class: Normal  
 Pharmacy: [id number] [address] Ph #: [phone] We Performed the Following AMB POC URINE PREGNANCY TEST, VISUAL COLOR COMPARISON [95580 CPT(R)] Follow-up Instructions Return in about 6 weeks (around 6/21/2018) for CPE with pap smear, med f/u. Introducing [facility] & HEALTH SERVICES! Demi Yi introduces Spreecast patient portal. Now you can access parts of your medical record, email your doctor's office, and request medication refills online. 1. In your internet browser, go to [email or web address] 2. Click on the First Time User? Click Here link in the Sign In box. You will see the New Member Sign Up page. 3. Enter your Spreecast Access Code exactly as it appears below. You will not need to use this code after youve completed the sign-up process. If you do not sign up before the expiration date, you must request a new code. · Spreecast Access Code: [email or web address] Expires: 5/17/2018 12:18 PM 
 
4. Enter the last four digits of your Social Security Number (xxxx) and Date of Birth (mm/dd/yyyy) as indicated and click Submit. You will be taken to the next sign-up page. 5. Create a Spreecast ID. This will be your Spreecast login ID and cannot be changed, so think of one that is secure and easy to remember. 6. Create a Spreecast password. You can change your password at any time. 7. Enter your Password Reset Question and Answer. This can be used at a later time if you forget your password. 8. Enter your e-mail address. You will receive e-mail notification when new information is available in [address]. 9. Click Sign Up. You can now view and download portions of your medical record. 10. Click the Download Summary menu link to download a portable copy of your medical information. If you have questions, please visit the Frequently Asked Questions section of the Spreecast website. Remember, Spreecast is NOT to be used for urgent needs. For medical emergencies, dial 911. Now available from your iPhone and Android! Please provide this summary of care documentation to your next provider. Your primary care clinician is listed as Diya Dupont. If you have any questions after today's visit, please call [phone].

## 2018-06-21 ENCOUNTER — OFFICE VISIT (OUTPATIENT)
Dept: FAMILY MEDICINE CLINIC | Age: 33
End: 2018-06-21

## 2018-06-21 VITALS
OXYGEN SATURATION: 100 % | BODY MASS INDEX: 26.74 KG/M2 | DIASTOLIC BLOOD PRESSURE: 73 MMHG | WEIGHT: 170.4 LBS | TEMPERATURE: 97.6 F | HEIGHT: 67 IN | SYSTOLIC BLOOD PRESSURE: 111 MMHG | RESPIRATION RATE: 16 BRPM | HEART RATE: 82 BPM

## 2018-06-21 DIAGNOSIS — N94.6 DYSMENORRHEA: ICD-10-CM

## 2018-06-21 DIAGNOSIS — Z00.00 PHYSICAL EXAM, ANNUAL: Primary | ICD-10-CM

## 2018-06-21 DIAGNOSIS — F33.1 MODERATE EPISODE OF RECURRENT MAJOR DEPRESSIVE DISORDER (HCC): ICD-10-CM

## 2018-06-21 DIAGNOSIS — Z12.4 SCREENING FOR CERVICAL CANCER: ICD-10-CM

## 2018-06-21 PROBLEM — N10 ACUTE PYELONEPHRITIS: Status: RESOLVED | Noted: 2018-03-22 | Resolved: 2018-06-21

## 2018-06-21 RX ORDER — BUPROPION HYDROCHLORIDE 150 MG/1
150 TABLET ORAL
Qty: 30 TAB | Refills: 6 | Status: SHIPPED | OUTPATIENT
Start: 2018-06-21 | End: 2018-10-23 | Stop reason: SDUPTHER

## 2018-06-21 RX ORDER — TRAZODONE HYDROCHLORIDE 50 MG/1
100 TABLET ORAL
Qty: 30 TAB | Refills: 5 | Status: SHIPPED | OUTPATIENT
Start: 2018-06-21 | End: 2018-06-21

## 2018-06-21 RX ORDER — VENLAFAXINE HYDROCHLORIDE 75 MG/1
CAPSULE, EXTENDED RELEASE ORAL
Refills: 0 | COMMUNITY
Start: 2018-04-15 | End: 2018-06-21

## 2018-06-21 RX ORDER — OXYCODONE AND ACETAMINOPHEN 5; 325 MG/1; MG/1
TABLET ORAL
Refills: 0 | COMMUNITY
Start: 2018-03-25 | End: 2018-06-21

## 2018-06-21 RX ORDER — ONDANSETRON 4 MG/1
TABLET, ORALLY DISINTEGRATING ORAL
Refills: 0 | COMMUNITY
Start: 2018-03-27 | End: 2018-06-21

## 2018-06-21 RX ORDER — HYDROCORTISONE 25 MG/G
CREAM TOPICAL
Refills: 0 | COMMUNITY
Start: 2018-05-10 | End: 2018-06-21

## 2018-06-21 RX ORDER — TRAZODONE HYDROCHLORIDE 100 MG/1
100 TABLET ORAL
Qty: 30 TAB | Refills: 6 | Status: SHIPPED | OUTPATIENT
Start: 2018-06-21 | End: 2018-10-23 | Stop reason: SDUPTHER

## 2018-06-21 NOTE — PATIENT INSTRUCTIONS

## 2018-06-21 NOTE — PROGRESS NOTES
Chief Complaint   Patient presents with    Complete Physical     Patient here for cpe w/pap. Patient concerned about \"knot on back\" trapezia area.

## 2018-06-21 NOTE — MR AVS SNAPSHOT
[facility] 
 
 
 [address] [facility] [id number] [phone] Patient: Will Dahl MRN: [id number] :1985 Visit Information Date & Time Provider Department Dept. Phone Encounter #  
 2018  1:30 PM Lorin Mcmanus NP [facility] [phone] [id number] Follow-up Instructions Return in about 6 months (around 2018) for med f/u. Upcoming Health Maintenance Date Due  
 PAP AKA CERVICAL CYTOLOGY 2008 Influenza Age 5 to Adult 2018 DTaP/Tdap/Td series (2 - Td) 2027 Allergies as of 2018  Review Complete On: 2018 By: Lorin Mcmanus NP Severity Noted Reaction Type Reactions Morphine  10/09/2015    Hives Current Immunizations  Never Reviewed No immunizations on file. Not reviewed this visit You Were Diagnosed With   
  
 Codes Comments Physical exam, annual    -  Primary ICD-10-CM: Z00.00 ICD-9-CM: V70.0 Moderate episode of recurrent major depressive disorder (HCC)     ICD-10-CM: F33.1 ICD-9-CM: 296.32 Dysmenorrhea     ICD-10-CM: N94.6 ICD-9-CM: 625.3 Screening for cervical cancer     ICD-10-CM: Z12.4 ICD-9-CM: V76.2 Vitals BP Pulse Temp Resp Height(growth percentile) Weight(growth percentile) 111/73 (BP 1 Location: Right arm, BP Patient Position: Sitting) 82 97.6 °F (36.4 °C) (Oral) 16 5' 7\" (1.702 m) 170 lb 6.4 oz (77.3 kg) LMP SpO2 Breastfeeding? BMI OB Status Smoking Status 2018 (Approximate) 100% No 26.69 kg/m2 Unknown Never Smoker Vitals History BMI and BSA Data Body Mass Index Body Surface Area  
 26.69 kg/m 2 1.91 m 2 Preferred Pharmacy Pharmacy Name Phone [id number] [address] Crist[address] Sadiq [phone] Your Updated Medication List  
  
   
This list is accurate as of 18  2:10 PM.  Always use your most recent med list.  
  
  
 ALPRAZolam 0.5 mg tablet Commonly known as:  Symone Washington Take 1 Tab by mouth three (3) times daily as needed for Anxiety. Max Daily Amount: 1.5 mg.  
  
 buPROPion  mg tablet Commonly known as:  Sharma Budjenae Take 1 Tab by mouth every morning. hydrocortisone acetate 2.5 % rectal cream  
INSERT INTO RECTUM 4 TIMES DAILY PRN  
  
 norethindrone-ethinyl estradiol-iron 1.5 mg-30 mcg (21)/75 mg (7) Tab Commonly known as:  JUNEL FE 1.5/30 (28) Take 1 Tab by mouth daily. traZODone 50 mg tablet Commonly known as:  Karli Hand Take 2 Tabs by mouth nightly. TYLENOL EXTRA STRENGTH 500 mg tablet Generic drug:  acetaminophen Take 1,000 mg by mouth every six (6) hours as needed for Pain. Prescriptions Sent to Pharmacy Refills  
 traZODone (DESYREL) 50 mg tablet 5 Sig: Take 2 Tabs by mouth nightly. Class: Normal  
 Pharmacy: [id number] Nirmal Ahumada, [address] [place] [address] #: [phone] Route: Oral  
  
We Performed the Following CBC W/O DIFF [11731 CPT(R)] LIPID PANEL AND CHOL/HDL RATIO [41490 CPT(R)] METABOLIC PANEL, COMPREHENSIVE [26179 CPT(R)] PAP LB, HPV-H+LR(347714) [[id number] Custom] TSH REFLEX TO T4 [37370 CPT(R)] URINALYSIS W/ RFLX MICROSCOPIC [01842 CPT(R)] Follow-up Instructions Return in about 6 months (around 12/21/2018) for med f/u. Patient Instructions Well Visit, Ages 25 to 48: Care Instructions Your Care Instructions Physical exams can help you stay healthy. Your doctor has checked your overall health and may have suggested ways to take good care of yourself. He or she also may have recommended tests. At home, you can help prevent illness with healthy eating, regular exercise, and other steps. Follow-up care is a key part of your treatment and safety.  Be sure to make and go to all appointments, and call your doctor if you are having problems. It's also a good idea to know your test results and keep a list of the medicines you take. How can you care for yourself at home? · Reach and stay at a healthy weight. This will lower your risk for many problems, such as obesity, diabetes, heart disease, and high blood pressure. · Get at least 30 minutes of physical activity on most days of the week. Walking is a good choice. You also may want to do other activities, such as running, swimming, cycling, or playing tennis or team sports. Discuss any changes in your exercise program with your doctor. · Do not smoke or allow others to smoke around you. If you need help quitting, talk to your doctor about stop-smoking programs and medicines. These can increase your chances of quitting for good. · Talk to your doctor about whether you have any risk factors for sexually transmitted infections (STIs). Having one sex partner (who does not have STIs and does not have sex with anyone else) is a good way to avoid these infections. · Use birth control if you do not want to have children at this time. Talk with your doctor about the choices available and what might be best for you. · Protect your skin from too much sun. When you're outdoors from 10 a.m. to 4 p.m., stay in the shade or cover up with clothing and a hat with a wide brim. Wear sunglasses that block UV rays. Even when it's cloudy, put broad-spectrum sunscreen (SPF 30 or higher) on any exposed skin. · See a dentist one or two times a year for checkups and to have your teeth cleaned. · Wear a seat belt in the car. · Drink alcohol in moderation, if at all. That means no more than 2 drinks a day for men and 1 drink a day for women. Follow your doctor's advice about when to have certain tests. These tests can spot problems early. For everyone · Cholesterol. Have the fat (cholesterol) in your blood tested after age 21.  Your doctor will tell you how often to have this done based on your age, family history, or other things that can increase your risk for heart disease. · Blood pressure. Have your blood pressure checked during a routine doctor visit. Your doctor will tell you how often to check your blood pressure based on your age, your blood pressure results, and other factors. · Vision. Talk with your doctor about how often to have a glaucoma test. 
· Diabetes. Ask your doctor whether you should have tests for diabetes. · Colon cancer. Have a test for colon cancer at age 48. You may have one of several tests. If you are younger than 48, you may need a test earlier if you have any risk factors. Risk factors include whether you already had a precancerous polyp removed from your colon or whether your parent, brother, sister, or child has had colon cancer. For women · Breast exam and mammogram. Talk to your doctor about when you should have a clinical breast exam and a mammogram. Medical experts differ on whether and how often women under 50 should have these tests. Your doctor can help you decide what is right for you. · Pap test and pelvic exam. Begin Pap tests at age 24. A Pap test is the best way to find cervical cancer. The test often is part of a pelvic exam. Ask how often to have this test. 
· Tests for sexually transmitted infections (STIs). Ask whether you should have tests for STIs. You may be at risk if you have sex with more than one person, especially if your partners do not wear condoms. For men · Tests for sexually transmitted infections (STIs). Ask whether you should have tests for STIs. You may be at risk if you have sex with more than one person, especially if you do not wear a condom. · Testicular cancer exam. Ask your doctor whether you should check your testicles regularly. · Prostate exam. Talk to your doctor about whether you should have a blood test (called a PSA test) for prostate cancer.  Experts differ on whether and when men should have this test. Some experts suggest it if you are older than 39 and are -American or have a father or brother who got prostate cancer when he was younger than 72. When should you call for help? Watch closely for changes in your health, and be sure to contact your doctor if you have any problems or symptoms that concern you. Where can you learn more? Go to http://danna-rupesh.info/. Enter P072 in the search box to learn more about \"Well Visit, Ages 25 to 48: Care Instructions. \" Current as of: May 12, 2017 Content Version: 11.4 © 6735-5702 "[employer]". Care instructions adapted under license by [employer] (which disclaims liability or warranty for this information). If you have questions about a medical condition or this instruction, always ask your healthcare professional. [address] any warranty or liability for your use of this information. Introducing \[facility]\"" & HEALTH SERVICES! Dear Amor Richard: Thank you for requesting a Onehub account. Our records indicate that you already have an active Onehub account. You can access your account anytime at [email or web address] Did you know that you can access your hospital and ER discharge instructions at any time in Onehub? You can also review all of your test results from your hospital stay or ER visit. Additional Information If you have questions, please visit the Frequently Asked Questions section of the Onehub website at [email or web address] Remember, Onehub is NOT to be used for urgent needs. For medical emergencies, dial 911. Now available from your iPhone and Android! Please provide this summary of care documentation to your next provider. Your primary care clinician is listed as Diane John. If you have any questions after today's visit, please call [phone].

## 2018-06-21 NOTE — PROGRESS NOTES
HISTORY OF PRESENT ILLNESS  Anand Morales is a 29 y.o. female. HPI    Here for CPE with pap smear and depression follow-up. Works as a nurse at [employer].      Depression/Anxiety  Was started on wellbutrin 150mg ER and trazodone 50mg for sleep at last office visit 6 weeks ago. Was previously on effexor, but it caused low libido so she stopped it. She reports that she is doing well on the wellbutrin and trazodone; PHQ-9 score of 2 today. Still not sleeping great, which is chronic per patient; she reports that she will often wake up in the middle of the night, her mind racing, and be unable to go back to sleep for awhile. Has taken only 2 xanax since her last visit here 6 weeks ago. Thrombocytopenia - Hx of this, got worse during pregnancy. Saw heme-onc at [facility], but never got any follow-up set up there and isn't sure what the plan was. Saw Dr. Nicole Lozada, and plan is to monitor at this point.      Hx HSV  Has been told that her bloodwork was positive for herpes, but has never had an outbreak.      Current Specialists:  1. [address] - OB/GYN; last visit summer 2017 (not planning to return)     Preventative Care  TDaP: May 2017  Pap smear: July 2017, per patient report; was normal after history of abnormal pap smear  Mammogram: has never had  Colonoscopy: has never had  Denies early family hx of breast or colon CA.      Healthy Habits  Patient is exercising 3 - 4x per week, running, and active with her kids. Has lost about 8 lbs since her last visit by exercising more and watching her diet. Patient endorses healthy diet; avoids fatty/greasy foods, sugar-sweetened beverages. Denies tobacco use. Social alcohol use. Denies illicit drug use. Sexually active with 1 male partner in last 12 months.  has a vasectomy; denies concerns for STIs today. On OCPs for dysmenorrhea; increased dose May 2018 due to BTB.  Still having some, but only 1 month into new dose.    Past Medical History:   Diagnosis Date    Abnormal Papanicolaou smear of cervix     Acute pyelonephritis 3/22/2018    Anemia     Coagulation disorder ([facility]     thrombocytopenia with pregnancy's    Disease of blood and blood forming organ     thrombocytopenia    Fetal heart disease 2017     Past Surgical History:   Procedure Laterality Date     DELIVERY ONLY      HX  SECTION  2017    HX GYN  10/2015        HX HEENT      T&A     Family History   Problem Relation Age of Onset    No Known Problems Mother     No Known Problems Father      Social History     Social History    Marital status:      Spouse name: Darwin Damian Number of children: N/A    Years of education: N/A     Social History Main Topics    Smoking status: Never Smoker    Smokeless tobacco: Never Used    Alcohol use Yes      Comment: occasion    Drug use: No    Sexual activity: Yes     Partners: Male     Birth control/ protection: Pill     Other Topics Concern    None     Social History Narrative     Patient Active Problem List   Diagnosis Code    Thrombocytopenia ([facility] D69.6    Previous  section complicating pregnancy J52.066    Dysmenorrhea N94.6    Moderate episode of recurrent major depressive disorder ([facility] F33.1     Outpatient Encounter Prescriptions as of 2018   Medication Sig Dispense Refill    traZODone (DESYREL) 100 mg tablet Take 1 Tab by mouth nightly. 30 Tab 6    buPROPion XL (WELLBUTRIN XL) 150 mg tablet Take 1 Tab by mouth every morning. 30 Tab 6    norethindrone-ethinyl estradiol-iron (JUNEL FE 1.5/30, 28,) 1.5 mg-30 mcg (21)/75 mg (7) tab Take 1 Tab by mouth daily.  1 Package 3    [DISCONTINUED] PROCTO-MED HC 2.5 % rectal cream insert rectally as directed four times a day if needed  0    [DISCONTINUED] ondansetron (ZOFRAN ODT) 4 mg disintegrating tablet dissolve 1 tablet ON TONGUE every 8 hours if needed for nausea  0    [DISCONTINUED] oxyCODONE-acetaminophen (PERCOCET) 5-325 mg per tablet take 2 tablets by mouth every 6 hours if needed UP TO 12 DOSES (MAX DAILY AMOUNT: 8 TABLETS)  0    [DISCONTINUED] venlafaxine-SR (EFFEXOR-XR) 75 mg capsule   0    [DISCONTINUED] traZODone (DESYREL) 50 mg tablet Take 2 Tabs by mouth nightly. 30 Tab 5    hydrocortisone acetate 2.5 % rectal cream INSERT INTO RECTUM 4 TIMES DAILY PRN 30 g 3    ALPRAZolam (XANAX) 0.5 mg tablet Take 1 Tab by mouth three (3) times daily as needed for Anxiety. Max Daily Amount: 1.5 mg. 10 Tab 0    [DISCONTINUED] buPROPion XL (WELLBUTRIN XL) 150 mg tablet Take 1 Tab by mouth every morning. 30 Tab 2    [DISCONTINUED] traZODone (DESYREL) 50 mg tablet Take 1 Tab by mouth nightly. 30 Tab 2    acetaminophen (TYLENOL EXTRA STRENGTH) 500 mg tablet Take 1,000 mg by mouth every six (6) hours as needed for Pain. No facility-administered encounter medications on file as of 6/21/2018. Visit Vitals    /73 (BP 1 Location: Right arm, BP Patient Position: Sitting)    Pulse 82    Temp 97.6 °F (36.4 °C) (Oral)    Resp 16    Ht 5' 7\" (1.702 m)    Wt 170 lb 6.4 oz (77.3 kg)    LMP 05/28/2018 (Approximate)    SpO2 100%    Breastfeeding No    BMI 26.69 kg/m2         Review of Systems   Constitutional: Negative for chills, diaphoresis, fever, malaise/fatigue and weight loss. HENT: Negative for congestion, ear pain, hearing loss, sinus pain and sore throat. Eyes: Negative for blurred vision, double vision and photophobia. Respiratory: Negative for cough, sputum production, shortness of breath and wheezing. Cardiovascular: Negative for chest pain, palpitations, orthopnea and leg swelling. Gastrointestinal: Negative for abdominal pain, blood in stool, constipation, diarrhea, heartburn, melena, nausea and vomiting. Genitourinary: Negative for hematuria. Musculoskeletal: Negative for falls and myalgias. Skin: Negative for itching and rash.    Neurological: Negative for dizziness, tingling, sensory change, speech change, seizures, loss of consciousness, weakness and headaches. Endo/Heme/Allergies: Does not bruise/bleed easily. Psychiatric/Behavioral: Negative for depression, substance abuse and suicidal ideas. The patient has insomnia. The patient is not nervous/anxious. Physical Exam   Constitutional: She is oriented to person, place, and time. She appears well-developed and well-nourished. No distress. HENT:   Head: Normocephalic and atraumatic. Right Ear: External ear normal.   Left Ear: External ear normal.   Nose: Nose normal.   Mouth/Throat: Oropharynx is clear and moist. No oropharyngeal exudate. Eyes: Conjunctivae and EOM are normal. Pupils are equal, round, and reactive to light. Right eye exhibits no discharge. Left eye exhibits no discharge. Neck: Normal range of motion. Neck supple. No thyromegaly present. Cardiovascular: Normal rate, regular rhythm, normal heart sounds and intact distal pulses. No murmur heard. Pulmonary/Chest: Effort normal and breath sounds normal. No respiratory distress. She has no wheezes. Right breast exhibits no inverted nipple, no mass, no nipple discharge, no skin change and no tenderness. Left breast exhibits no inverted nipple, no mass, no nipple discharge, no skin change and no tenderness. Breasts are symmetrical.   Abdominal: Soft. Bowel sounds are normal. She exhibits no distension and no mass. There is no tenderness. There is no rebound and no guarding. Hernia confirmed negative in the right inguinal area and confirmed negative in the left inguinal area. Genitourinary: Vagina normal and uterus normal. Pelvic exam was performed with patient supine. No labial fusion. There is no rash, tenderness, lesion or injury on the right labia. There is no rash, tenderness, lesion or injury on the left labia. Cervix exhibits no motion tenderness, no discharge and no friability.  Right adnexum displays no mass, no tenderness and no fullness. Left adnexum displays no mass, no tenderness and no fullness. Musculoskeletal: Normal range of motion. Non-tender, non-visible linear palpable lump along left posterior upper back extending from shoulder to paraspinal muscles   Lymphadenopathy:     She has no cervical adenopathy. Right: No inguinal adenopathy present. Left: No inguinal adenopathy present. Neurological: She is alert and oriented to person, place, and time. No cranial nerve deficit. Skin: Skin is warm. She is not diaphoretic. Psychiatric: She has a normal mood and affect. Her behavior is normal.   Nursing note and vitals reviewed. ASSESSMENT and PLAN    ICD-10-CM ICD-9-CM    1. Physical exam, annual Z00.00 V70.0 CBC W/O DIFF      URINALYSIS W/ RFLX MICROSCOPIC      METABOLIC PANEL, COMPREHENSIVE      TSH REFLEX TO T4      LIPID PANEL AND CHOL/HDL RATIO   2. Moderate episode of recurrent major depressive disorder (HCC) F33.1 296.32 traZODone (DESYREL) 100 mg tablet      buPROPion XL (WELLBUTRIN XL) 150 mg tablet      DISCONTINUED: traZODone (DESYREL) 50 mg tablet   3. Dysmenorrhea N94.6 625.3    4. Screening for cervical cancer Z12.4 V76.2 PAP LB, HPV-H+LR([id number])     1. CPE - Normal CPE today. Pap smear sent. Remainder of preventative care needs UTD. Check basic labs and call patient next week with results. 2. Depression - Well-controlled, with PHQ-9 score of 2 today. Will increase dose of trazodone to 100mg for sleep, and also advised writing down concerns prior to going to bed to prevent mind racing. 3. Dysmenorrhea - Continue OCPs; call if BTB continues longer than 3 months. Return in 6 months for medication follow-up, sooner PRN or TBD by lab results. Follow-up Disposition:  Return in about 6 months (around 12/21/2018) for med f/u.

## 2018-06-22 ENCOUNTER — TELEPHONE (OUTPATIENT)
Dept: FAMILY MEDICINE CLINIC | Age: 33
End: 2018-06-22

## 2018-06-22 LAB
ALBUMIN SERPL-MCNC: 4.3 G/DL (ref 3.5–5.5)
ALBUMIN/GLOB SERPL: 1.5 {RATIO} (ref 1.2–2.2)
ALP SERPL-CCNC: 56 IU/L (ref 39–117)
ALT SERPL-CCNC: 16 IU/L (ref 0–32)
APPEARANCE UR: CLEAR
AST SERPL-CCNC: 16 IU/L (ref 0–40)
BILIRUB SERPL-MCNC: 0.4 MG/DL (ref 0–1.2)
BILIRUB UR QL STRIP: NEGATIVE
BUN SERPL-MCNC: 9 MG/DL (ref 6–20)
BUN/CREAT SERPL: 12 (ref 9–23)
CALCIUM SERPL-MCNC: 9 MG/DL (ref 8.7–10.2)
CHLORIDE SERPL-SCNC: 101 MMOL/L (ref 96–106)
CHOLEST SERPL-MCNC: 143 MG/DL (ref 100–199)
CHOLEST/HDLC SERPL: 2.9 RATIO (ref 0–4.4)
CO2 SERPL-SCNC: 23 MMOL/L (ref 20–29)
COLOR UR: YELLOW
CREAT SERPL-MCNC: 0.77 MG/DL (ref 0.57–1)
ERYTHROCYTE [DISTWIDTH] IN BLOOD BY AUTOMATED COUNT: 13.3 % (ref 12.3–15.4)
GFR SERPLBLD CREATININE-BSD FMLA CKD-EPI: 104 ML/MIN/1.73
GFR SERPLBLD CREATININE-BSD FMLA CKD-EPI: 120 ML/MIN/1.73
GLOBULIN SER CALC-MCNC: 2.9 G/DL (ref 1.5–4.5)
GLUCOSE SERPL-MCNC: 84 MG/DL (ref 65–99)
GLUCOSE UR QL: NEGATIVE
HCT VFR BLD AUTO: 42.3 % (ref 34–46.6)
HDLC SERPL-MCNC: 50 MG/DL
HGB BLD-MCNC: 13.8 G/DL (ref 11.1–15.9)
HGB UR QL STRIP: NEGATIVE
INTERPRETATION, 910389: NORMAL
KETONES UR QL STRIP: NEGATIVE
LDLC SERPL CALC-MCNC: 66 MG/DL (ref 0–99)
LEUKOCYTE ESTERASE UR QL STRIP: NEGATIVE
MCH RBC QN AUTO: 30.7 PG (ref 26.6–33)
MCHC RBC AUTO-ENTMCNC: 32.6 G/DL (ref 31.5–35.7)
MCV RBC AUTO: 94 FL (ref 79–97)
MICRO URNS: ABNORMAL
MORPHOLOGY BLD-IMP: ABNORMAL
NITRITE UR QL STRIP: NEGATIVE
PH UR STRIP: 8 [PH] (ref 5–7.5)
PLATELET # BLD AUTO: 98 X10E3/UL (ref 150–379)
POTASSIUM SERPL-SCNC: 4 MMOL/L (ref 3.5–5.2)
PROT SERPL-MCNC: 7.2 G/DL (ref 6–8.5)
PROT UR QL STRIP: ABNORMAL
RBC # BLD AUTO: 4.5 X10E6/UL (ref 3.77–5.28)
SODIUM SERPL-SCNC: 139 MMOL/L (ref 134–144)
SP GR UR: 1.02 (ref 1–1.03)
TRIGL SERPL-MCNC: 134 MG/DL (ref 0–149)
TSH SERPL DL<=0.005 MIU/L-ACNC: 2.03 UIU/ML (ref 0.45–4.5)
UROBILINOGEN UR STRIP-MCNC: 0.2 MG/DL (ref 0.2–1)
VLDLC SERPL CALC-MCNC: 27 MG/DL (ref 5–40)
WBC # BLD AUTO: 9.2 X10E3/UL (ref 3.4–10.8)

## 2018-06-22 NOTE — TELEPHONE ENCOUNTER
----- Message from Dank Narvaez NP sent at 6/22/2018 10:20 AM EDT -----  Please let patient know that her labs look great with the exception of her platelets, which are 98. She should follow up with Dr. Johan Chapman as he recommended. Otherwise, kidney/liver/thyroid function and electrolytes all look great. Thanks!   CAB

## 2018-06-22 NOTE — TELEPHONE ENCOUNTER
Patient notified of blood work results and to follow up with Dr. Chris Fajardo as directed. Will call with pap results once they result. Patient verbalized understanding.

## 2018-06-22 NOTE — PROGRESS NOTES
Please let patient know that her labs look great with the exception of her platelets, which are 98. She should follow up with Dr. Myra Salgado as he recommended. Otherwise, kidney/liver/thyroid function and electrolytes all look great. Thanks!   CAB

## 2018-06-27 LAB
CYTOLOGIST CVX/VAG CYTO: NORMAL
DX ICD CODE: NORMAL
DX ICD CODE: NORMAL
HPV I/H RISK 1 DNA CVX QL PROBE+SIG AMP: NEGATIVE
HPV LOW RISK DNA CVX QL PROBE+SIG AMP: NEGATIVE
Lab: NORMAL
Lab: NORMAL
OTHER STN SPEC: NORMAL
PATH REPORT.FINAL DX SPEC: NORMAL
STAT OF ADQ CVX/VAG CYTO-IMP: NORMAL

## 2018-06-28 ENCOUNTER — TELEPHONE (OUTPATIENT)
Dept: FAMILY MEDICINE CLINIC | Age: 33
End: 2018-06-28

## 2018-06-28 RX ORDER — AMOXICILLIN AND CLAVULANATE POTASSIUM 875; 125 MG/1; MG/1
1 TABLET, FILM COATED ORAL 2 TIMES DAILY
Qty: 20 TAB | Refills: 0 | Status: SHIPPED | OUTPATIENT
Start: 2018-06-28 | End: 2018-07-08

## 2018-06-28 NOTE — TELEPHONE ENCOUNTER
Patient notified of pap results. Patient to contact office for repeat pap smear. Patient having nasal congestion and ear fullness. Message for new medication sent to pcp for review.

## 2018-06-28 NOTE — PROGRESS NOTES
Please let Winsome know that her pap smear returned unsatisfactory for evaluation, which happens between 5 - 10% of the time. It was negative for HPV, but we need to repeat it at her convenience, especially given her history of abnormal pap smear. Please schedule her for an appt within the next month to repeat pap. Thanks!   CAB

## 2018-06-28 NOTE — TELEPHONE ENCOUNTER
Patient came in last week and thought she was in the beginning stages of a sinus infection and now it is full blown she would like to know if Alayna Taylor will call something in for her. Patient can be reached at [phone].

## 2018-06-28 NOTE — TELEPHONE ENCOUNTER
----- Message from Lorin Mcmanus NP sent at 6/28/2018  8:10 AM EDT -----  Please let Winsome know that her pap smear returned unsatisfactory for evaluation, which happens between 5 - 10% of the time. It was negative for HPV, but we need to repeat it at her convenience, especially given her history of abnormal pap smear. Please schedule her for an appt within the next month to repeat pap. Thanks!   CAB

## 2018-08-21 DIAGNOSIS — N94.6 DYSMENORRHEA: ICD-10-CM

## 2018-08-21 RX ORDER — NORETHINDRONE ACETATE AND ETHINYL ESTRADIOL 1.5-30(21)
KIT ORAL
Qty: 28 TAB | Refills: 3 | Status: SHIPPED | OUTPATIENT
Start: 2018-08-21 | End: 2018-10-23 | Stop reason: SDUPTHER

## 2018-09-26 ENCOUNTER — TELEPHONE (OUTPATIENT)
Dept: FAMILY MEDICINE CLINIC | Age: 33
End: 2018-09-26

## 2018-09-26 NOTE — TELEPHONE ENCOUNTER
Pt reports red nodules popping up all over, especially on her  back, they are not hot or itchy       She didn't want to come in till Oct. 1, 2018     Pls call to advise [phone]

## 2018-10-01 ENCOUNTER — TELEPHONE (OUTPATIENT)
Dept: FAMILY MEDICINE CLINIC | Age: 33
End: 2018-10-01

## 2018-10-01 ENCOUNTER — OFFICE VISIT (OUTPATIENT)
Dept: FAMILY MEDICINE CLINIC | Age: 33
End: 2018-10-01

## 2018-10-01 ENCOUNTER — HOSPITAL ENCOUNTER (OUTPATIENT)
Dept: GENERAL RADIOLOGY | Age: 33
Discharge: HOME | End: 2018-10-01
Payer: COMMERCIAL

## 2018-10-01 VITALS
SYSTOLIC BLOOD PRESSURE: 108 MMHG | WEIGHT: 165 LBS | HEART RATE: 76 BPM | DIASTOLIC BLOOD PRESSURE: 81 MMHG | HEIGHT: 67 IN | BODY MASS INDEX: 25.9 KG/M2 | TEMPERATURE: 98.1 F | RESPIRATION RATE: 16 BRPM | OXYGEN SATURATION: 98 %

## 2018-10-01 DIAGNOSIS — R22.9 MASS OF SUBCUTANEOUS TISSUE: Primary | ICD-10-CM

## 2018-10-01 DIAGNOSIS — Z12.4 SCREENING FOR CERVICAL CANCER: ICD-10-CM

## 2018-10-01 DIAGNOSIS — R22.9 MASS OF SUBCUTANEOUS TISSUE: ICD-10-CM

## 2018-10-01 DIAGNOSIS — N91.2 AMENORRHEA: ICD-10-CM

## 2018-10-01 LAB
HCG URINE, QL. (POC): NEGATIVE
VALID INTERNAL CONTROL?: YES

## 2018-10-01 PROCEDURE — 71046 X-RAY EXAM CHEST 2 VIEWS: CPT

## 2018-10-01 RX ORDER — DOXYCYCLINE 100 MG/1
100 CAPSULE ORAL 2 TIMES DAILY
Qty: 20 CAP | Refills: 0 | Status: SHIPPED | OUTPATIENT
Start: 2018-10-01 | End: 2018-10-11

## 2018-10-01 RX ORDER — PREDNISONE 5 MG/1
TABLET ORAL
Qty: 21 TAB | Refills: 0 | Status: SHIPPED | OUTPATIENT
Start: 2018-10-01 | End: 2018-10-23 | Stop reason: ALTCHOICE

## 2018-10-01 RX ORDER — NITROFURANTOIN 25; 75 MG/1; MG/1
CAPSULE ORAL
Refills: 0 | COMMUNITY
Start: 2018-07-21 | End: 2018-10-01 | Stop reason: ALTCHOICE

## 2018-10-01 NOTE — PATIENT INSTRUCTIONS
1. Please go to outpatient registration at one of the [facility] and have your chest X-ray done. 2. We will call you once we get the results of your lab work and chest X-ray. 3. Start the prednisone and doxycycline. 4. We will follow up in 3 weeks. If you still have not had a period at that time, we will address what to do.

## 2018-10-01 NOTE — TELEPHONE ENCOUNTER
----- Message from Bebeto Talavera NP sent at 10/1/2018 10:06 AM EDT -----  Please thank patient for getting her CXR done so quickly. It is normal, so we'll wait for lab results. Thanks!   CAB

## 2018-10-01 NOTE — MR AVS SNAPSHOT
[address] [facility] 
 
 
 [address] [facility] [address] [address] 
[phone] Patient: Snehal Hill MRN: [id number] :1985 Visit Information Date & Time Provider Department Dept. Phone Encounter #  
 10/1/2018  8:00 AM Toni Freeman NP [facility] [phone] [id number] Follow-up Instructions Return in about 3 weeks (around 10/22/2018) for f/u. Upcoming Health Maintenance Date Due Influenza Age 5 to Adult 3/31/2019* PAP AKA CERVICAL CYTOLOGY 2021 DTaP/Tdap/Td series (2 - Td) 2027 *Topic was postponed. The date shown is not the original due date. Allergies as of 10/1/2018  Review Complete On: 10/1/2018 By: Toni Freeman NP Severity Noted Reaction Type Reactions Morphine  10/09/2015    Hives Current Immunizations  Never Reviewed No immunizations on file. Not reviewed this visit You Were Diagnosed With   
  
 Codes Comments Mass of subcutaneous tissue    -  Primary ICD-10-CM: R22.9 ICD-9-CM: 452. 2 Amenorrhea     ICD-10-CM: N91.2 ICD-9-CM: 626.0 Screening for cervical cancer     ICD-10-CM: Z12.4 ICD-9-CM: V76.2 Vitals BP Pulse Temp Resp Height(growth percentile) Weight(growth percentile) 108/81 (BP 1 Location: Left arm, BP Patient Position: Sitting) 76 98.1 °F (36.7 °C) (Oral) 16 5' 7\" (1.702 m) 165 lb (74.8 kg) LMP SpO2 BMI OB Status Smoking Status 2018 98% 25.84 kg/m2 Unknown Never Smoker BMI and BSA Data Body Mass Index Body Surface Area  
 25.84 kg/m 2 1.88 m 2 Preferred Pharmacy Pharmacy Name Phone [facility] [phone] Your Updated Medication List  
  
   
This list is accurate as of 10/1/18  8:32 AM.  Always use your most recent med list.  
  
  
  
  
 ALPRAZolam 0.5 mg tablet Commonly known as:  Wade Ebbing Take 1 Tab by mouth three (3) times daily as needed for Anxiety. Max Daily Amount: 1.5 mg. BLISOVI FE 1.5/30 (28) 1.5 mg-30 mcg (21)/75 mg (7) Tab Generic drug:  norethindrone-ethinyl estradiol-iron  
take 1 tablet by mouth once daily buPROPion  mg tablet Commonly known as:  Leonce Bale Take 1 Tab by mouth every morning. doxycycline 100 mg capsule Commonly known as:  VIBRAMYCIN Take 1 Cap by mouth two (2) times a day for 10 days. predniSONE 5 mg dose pack Commonly known as:  STERAPRED See administration instruction per 5mg dose pack  
  
 traZODone 100 mg tablet Commonly known as:  Patrisha Glatter Take 1 Tab by mouth nightly. TYLENOL EXTRA STRENGTH 500 mg tablet Generic drug:  acetaminophen Take 1,000 mg by mouth every six (6) hours as needed for Pain. Prescriptions Sent to Pharmacy Refills  
 predniSONE (STERAPRED) 5 mg dose pack 0 Sig: See administration instruction per 5mg dose pack Class: Normal  
 [facility]acy: [facility] [address], [address] Ph #: [phone]  
 doxycycline (VIBRAMYCIN) 100 mg capsule 0 Sig: Take 1 Cap by mouth two (2) times a day for 10 days. Class: Normal  
 [facility], [address] [place] [address] Ph #: [phone] Route: Oral  
  
We Performed the Following AMB POC URINE PREGNANCY TEST, VISUAL COLOR COMPARISON [83031 CPT(R)] C REACTIVE PROTEIN, QT [41395 CPT(R)] CBC WITH AUTOMATED DIFF [04065 CPT(R)] METABOLIC PANEL, COMPREHENSIVE [26997 CPT(R)] PAP LB, HPV-H+LR([id number]) [[id number] Custom] SED RATE (ESR) [id number] CPT(R)] Follow-up Instructions Return in about 3 weeks (around 10/22/2018) for f/u. To-Do List   
 10/01/2018 Imaging:  XR CHEST PA LAT Patient Instructions 1. Please go to outpatient registration at one of the [facility] and have your chest X-ray done. 2. We will call you once we get the results of your lab work and chest X-ray. 3. Start the prednisone and doxycycline. 4. We will follow up in 3 weeks. If you still have not had a period at that time, we will address what to do. Introducing [facility] SERVICES! Dear Mona Paris: Thank you for requesting a [email or web address] account. Our records indicate that you already have an active [email or web address] account. You can access your account anytime at [email or web address] Did you know that you can access your hospital and ER discharge instructions at any time in [email or web address]? You can also review all of your test results from your hospital stay or ER visit. Additional Information If you have questions, please visit the Frequently Asked Questions section of the [email or web address] website at [email or web address] Remember, [email or web address] is NOT to be used for urgent needs. For medical emergencies, dial 911. Now available from your iPhone and Android! Please provide this summary of care documentation to your next provider. Your primary care clinician is listed as Denzel Barth. If you have any questions after today's visit, please call [phone].

## 2018-10-01 NOTE — PROGRESS NOTES
Please thank patient for getting her CXR done so quickly. It is normal, so we'll wait for lab results. Thanks!   CAB

## 2018-10-01 NOTE — PROGRESS NOTES
Chief Complaint   Patient presents with    Skin Problem     nodules - mainly torso area     Patient here for \"nodules\" on torso area mainly. More prominent in last month or two. No ER or specialists.

## 2018-10-01 NOTE — PROGRESS NOTES
HISTORY OF PRESENT ILLNESS  Amado Abraham is a 29 y.o. female. HPI  Patient with PMHx significant for thrombocytopenia, depression presents today for an acute visit with CC of \"bumps/nodules on skin. \" At her physical in 2018, she mentioned that her  had noticed a lump on her left scapula. It was non-tender and did not bother her at all, but we decided to keep an eye on it. In the past couple of months, she has noticed more of these non-tender lumps - she has one on her right breast, two on her torso, one on her left ankle, and one on her right calf, in addition to her scapula, which she thinks is enlarging. She has lost about 5 lbs since her last visit in , but she is still losing her pregnancy weight, so she does not find this alarming. She denies fevers, chills, tenderness, or history of the same. Does not know anything about her father, and mother is healthy, per patient. Additionally, though her  has had a vasectomy and she is on OCPs and endorses good adherence to them, she has not had a period since early 2018. Denies missing any pills. Also needs to repeat pap smear as it returned unsatisfactory in 2018.      Past Medical History:   Diagnosis Date    Abnormal Papanicolaou smear of cervix     Acute pyelonephritis 3/22/2018    Anemia     Coagulation disorder ([address]     thrombocytopenia with pregnancy's    Disease of blood and blood forming organ     thrombocytopenia    Fetal heart disease 2017     Past Surgical History:   Procedure Laterality Date     DELIVERY ONLY      HX  SECTION  2017    HX GYN  10/2015        HX HEENT      T&A     Family History   Problem Relation Age of Onset    No Known Problems Mother     No Known Problems Father      Social History     Social History    Marital status:      Spouse name: Ramiro Woods Number of children: N/A    Years of education: N/A     Social History Main Topics    Smoking status: Never Smoker    Smokeless tobacco: Never Used    Alcohol use Yes      Comment: occasion    Drug use: No    Sexual activity: Yes     Partners: Male     Birth control/ protection: Pill     Other Topics Concern    None     Social History Narrative     Patient Active Problem List   Diagnosis Code    Thrombocytopenia ([facility] D69.6    Previous  section complicating pregnancy D80.597    Dysmenorrhea N94.6    Moderate episode of recurrent major depressive disorder ([facility] F33.1     Outpatient Encounter Prescriptions as of 10/1/2018   Medication Sig Dispense Refill    predniSONE (STERAPRED) 5 mg dose pack See administration instruction per 5mg dose pack 21 Tab 0    doxycycline (VIBRAMYCIN) 100 mg capsule Take 1 Cap by mouth two (2) times a day for 10 days. 20 Cap 0    BLISOVI FE 1.5/30, 28, 1.5 mg-30 mcg (21)/75 mg (7) tab take 1 tablet by mouth once daily 28 Tab 3    traZODone (DESYREL) 100 mg tablet Take 1 Tab by mouth nightly. 30 Tab 6    buPROPion XL (WELLBUTRIN XL) 150 mg tablet Take 1 Tab by mouth every morning. 30 Tab 6    [DISCONTINUED] nitrofurantoin, macrocrystal-monohydrate, (MACROBID) 100 mg capsule take 1 capsule by mouth twice a day for 10 days  0    ALPRAZolam (XANAX) 0.5 mg tablet Take 1 Tab by mouth three (3) times daily as needed for Anxiety. Max Daily Amount: 1.5 mg. 10 Tab 0    [DISCONTINUED] hydrocortisone acetate 2.5 % rectal cream INSERT INTO RECTUM 4 TIMES DAILY PRN 30 g 3    acetaminophen (TYLENOL EXTRA STRENGTH) 500 mg tablet Take 1,000 mg by mouth every six (6) hours as needed for Pain. No facility-administered encounter medications on file as of 10/1/2018.       Visit Vitals    /81 (BP 1 Location: Left arm, BP Patient Position: Sitting)    Pulse 76    Temp 98.1 °F (36.7 °C) (Oral)    Resp 16    Ht 5' 7\" (1.702 m)    Wt 165 lb (74.8 kg)    LMP 2018    SpO2 98%    BMI 25.84 kg/m2         Review of Systems   Constitutional: Negative for chills, fever, malaise/fatigue and weight loss. HENT: Negative for congestion, sinus pain and sore throat. Gastrointestinal: Negative for blood in stool. Musculoskeletal: Negative for myalgias. Skin:        Numerous lumps under skin   Neurological: Negative for focal weakness. Endo/Heme/Allergies: Negative for polydipsia. Does not bruise/bleed easily. Physical Exam   Constitutional: She is oriented to person, place, and time. She appears well-developed and well-nourished. No distress. HENT:   Head: Normocephalic and atraumatic. Cardiovascular: Normal rate, regular rhythm and normal heart sounds. Pulmonary/Chest: Effort normal and breath sounds normal. No respiratory distress. She has no wheezes. Neurological: She is alert and oriented to person, place, and time. Skin: Skin is warm and dry. She is not diaphoretic. Numerous non-visible but palpable firm, mobile, non-tender nodules - left scapula, right calf, left ankle, along with 3 on right abdomen and right breast; each is < 1cm in diameter   Psychiatric: She has a normal mood and affect. Her behavior is normal. Judgment normal.   Nursing note and vitals reviewed. ASSESSMENT and PLAN    ICD-10-CM ICD-9-CM    1. Mass of subcutaneous tissue R22.9 782.2 CBC WITH AUTOMATED DIFF      METABOLIC PANEL, COMPREHENSIVE      SED RATE (ESR)      C REACTIVE PROTEIN, QT      XR CHEST PA LAT      predniSONE (STERAPRED) 5 mg dose pack      doxycycline (VIBRAMYCIN) 100 mg capsule   2. Amenorrhea N91.2 626.0 AMB POC URINE PREGNANCY TEST, VISUAL COLOR COMPARISON   3. Screening for cervical cancer Z12.4 V76.2 PAP LB, HPV-H+LR(024049)     1. Masses of subcutaneous tissue - Unclear etiology. Will check CBC, CMP, ESR, CRP today and send for CXR. Trial of prednisone and doxycycline, and return in 3 weeks for re-evaluation. If no improvement, will send for biopsy. 2. Amenorrhea - UPT negative today. Finish current pill pack.  If no menses with next placebo pills, will do further investigation. 3. Screen cervical cancer -  Repeat pap smear sent today. Return in 3 weeks for re-evaluation. Follow-up Disposition:  Return in about 3 weeks (around 10/22/2018) for f/u.

## 2018-10-02 ENCOUNTER — TELEPHONE (OUTPATIENT)
Dept: FAMILY MEDICINE CLINIC | Age: 33
End: 2018-10-02

## 2018-10-02 LAB
ALBUMIN SERPL-MCNC: 4.6 G/DL (ref 3.5–5.5)
ALBUMIN/GLOB SERPL: 1.7 {RATIO} (ref 1.2–2.2)
ALP SERPL-CCNC: 50 IU/L (ref 39–117)
ALT SERPL-CCNC: 19 IU/L (ref 0–32)
AST SERPL-CCNC: 17 IU/L (ref 0–40)
BASOPHILS # BLD AUTO: 0 X10E3/UL (ref 0–0.2)
BASOPHILS NFR BLD AUTO: 0 %
BILIRUB SERPL-MCNC: 0.8 MG/DL (ref 0–1.2)
BUN SERPL-MCNC: 10 MG/DL (ref 6–20)
BUN/CREAT SERPL: 11 (ref 9–23)
CALCIUM SERPL-MCNC: 9 MG/DL (ref 8.7–10.2)
CHLORIDE SERPL-SCNC: 103 MMOL/L (ref 96–106)
CO2 SERPL-SCNC: 25 MMOL/L (ref 20–29)
CREAT SERPL-MCNC: 0.92 MG/DL (ref 0.57–1)
CRP SERPL-MCNC: 1.5 MG/L (ref 0–4.9)
EOSINOPHIL # BLD AUTO: 0.4 X10E3/UL (ref 0–0.4)
EOSINOPHIL NFR BLD AUTO: 6 %
ERYTHROCYTE [DISTWIDTH] IN BLOOD BY AUTOMATED COUNT: 13.6 % (ref 12.3–15.4)
ERYTHROCYTE [SEDIMENTATION RATE] IN BLOOD BY WESTERGREN METHOD: 5 MM/HR (ref 0–32)
GLOBULIN SER CALC-MCNC: 2.7 G/DL (ref 1.5–4.5)
GLUCOSE SERPL-MCNC: 74 MG/DL (ref 65–99)
HCT VFR BLD AUTO: 42 % (ref 34–46.6)
HGB BLD-MCNC: 13.9 G/DL (ref 11.1–15.9)
IMM GRANULOCYTES # BLD: 0 X10E3/UL (ref 0–0.1)
IMM GRANULOCYTES NFR BLD: 0 %
LYMPHOCYTES # BLD AUTO: 1.6 X10E3/UL (ref 0.7–3.1)
LYMPHOCYTES NFR BLD AUTO: 21 %
MCH RBC QN AUTO: 31.6 PG (ref 26.6–33)
MCHC RBC AUTO-ENTMCNC: 33.1 G/DL (ref 31.5–35.7)
MCV RBC AUTO: 96 FL (ref 79–97)
MONOCYTES # BLD AUTO: 0.5 X10E3/UL (ref 0.1–0.9)
MONOCYTES NFR BLD AUTO: 6 %
NEUTROPHILS # BLD AUTO: 5 X10E3/UL (ref 1.4–7)
NEUTROPHILS NFR BLD AUTO: 67 %
PLATELET # BLD AUTO: 114 X10E3/UL (ref 150–379)
POTASSIUM SERPL-SCNC: 4.6 MMOL/L (ref 3.5–5.2)
PROT SERPL-MCNC: 7.3 G/DL (ref 6–8.5)
RBC # BLD AUTO: 4.4 X10E6/UL (ref 3.77–5.28)
SODIUM SERPL-SCNC: 140 MMOL/L (ref 134–144)
WBC # BLD AUTO: 7.5 X10E3/UL (ref 3.4–10.8)

## 2018-10-02 NOTE — TELEPHONE ENCOUNTER
----- Message from Alexey Heaton NP sent at 10/2/2018  7:37 AM EDT -----  Please let patient know t hat her lab results from yesterday are all normal with the exception of her platelets. They are, howver, back up to baseline at 114, so nothing to be concerned about at this time. Still waiting on pap smear results; will be in touch once they are available. Thanks!   CAB

## 2018-10-02 NOTE — PROGRESS NOTES
Please let patient know t hat her lab results from yesterday are all normal with the exception of her platelets. They are, howver, back up to baseline at 114, so nothing to be concerned about at this time. Still waiting on pap smear results; will be in touch once they are available. Thanks!   CAB

## 2018-10-04 LAB
CYTOLOGIST CVX/VAG CYTO: NORMAL
DX ICD CODE: NORMAL
HPV I/H RISK 1 DNA CVX QL PROBE+SIG AMP: NEGATIVE
HPV LOW RISK DNA CVX QL PROBE+SIG AMP: NEGATIVE
Lab: NORMAL
OTHER STN SPEC: NORMAL
PATH REPORT.FINAL DX SPEC: NORMAL
STAT OF ADQ CVX/VAG CYTO-IMP: NORMAL

## 2018-10-05 ENCOUNTER — TELEPHONE (OUTPATIENT)
Dept: FAMILY MEDICINE CLINIC | Age: 33
End: 2018-10-05

## 2018-10-05 NOTE — TELEPHONE ENCOUNTER
----- Message from Toni Freeman NP sent at 10/5/2018  8:20 AM EDT -----  Please let patient know that her pap smear is totally normal and does not need to be repeated for 5 years. Hooray! Thanks!   CAB

## 2018-10-05 NOTE — PROGRESS NOTES
Please let patient know that her pap smear is totally normal and does not need to be repeated for 5 years. Hooray! Thanks!   CAB

## 2018-10-23 ENCOUNTER — OFFICE VISIT (OUTPATIENT)
Dept: FAMILY MEDICINE CLINIC | Age: 33
End: 2018-10-23

## 2018-10-23 VITALS
DIASTOLIC BLOOD PRESSURE: 82 MMHG | HEART RATE: 82 BPM | SYSTOLIC BLOOD PRESSURE: 120 MMHG | OXYGEN SATURATION: 98 % | WEIGHT: 164 LBS | TEMPERATURE: 98.6 F | HEIGHT: 67 IN | RESPIRATION RATE: 16 BRPM | BODY MASS INDEX: 25.74 KG/M2

## 2018-10-23 DIAGNOSIS — N94.6 DYSMENORRHEA: ICD-10-CM

## 2018-10-23 DIAGNOSIS — R22.9 SUBCUTANEOUS MASS: Primary | ICD-10-CM

## 2018-10-23 DIAGNOSIS — F33.1 MODERATE EPISODE OF RECURRENT MAJOR DEPRESSIVE DISORDER (HCC): ICD-10-CM

## 2018-10-23 RX ORDER — BUPROPION HYDROCHLORIDE 150 MG/1
150 TABLET ORAL
Qty: 30 TAB | Refills: 6 | Status: SHIPPED | OUTPATIENT
Start: 2018-10-23 | End: 2019-05-03 | Stop reason: DRUGHIGH

## 2018-10-23 RX ORDER — NORETHINDRONE ACETATE AND ETHINYL ESTRADIOL 1.5-30(21)
KIT ORAL
Qty: 28 TAB | Refills: 11 | Status: SHIPPED | OUTPATIENT
Start: 2018-10-23 | End: 2018-11-05 | Stop reason: SINTOL

## 2018-10-23 RX ORDER — LANOLIN ALCOHOL/MO/W.PET/CERES
CREAM (GRAM) TOPICAL
COMMUNITY
End: 2018-10-23

## 2018-10-23 RX ORDER — ACETAMINOPHEN 500 MG
TABLET ORAL
COMMUNITY
End: 2018-10-23

## 2018-10-23 RX ORDER — VITAMIN E 1000 UNIT
CAPSULE ORAL
COMMUNITY
End: 2018-10-23

## 2018-10-23 RX ORDER — ACETAMINOPHEN AND CODEINE PHOSPHATE 120; 12 MG/5ML; MG/5ML
SOLUTION ORAL
COMMUNITY
End: 2018-10-23

## 2018-10-23 RX ORDER — DOXYLAMINE SUCCINATE AND PYRIDOXINE HYDROCHLORIDE, DELAYED RELEASE TABLETS 10 MG/10 MG 10; 10 MG/1; MG/1
TABLET, DELAYED RELEASE ORAL
COMMUNITY
End: 2018-10-23

## 2018-10-23 RX ORDER — VALACYCLOVIR HYDROCHLORIDE 1 G/1
TABLET, FILM COATED ORAL
COMMUNITY
End: 2018-10-23

## 2018-10-23 RX ORDER — TRAZODONE HYDROCHLORIDE 100 MG/1
100 TABLET ORAL
Qty: 30 TAB | Refills: 6 | Status: SHIPPED | OUTPATIENT
Start: 2018-10-23 | End: 2019-06-12 | Stop reason: SDUPTHER

## 2018-10-23 NOTE — PROGRESS NOTES
HISTORY OF PRESENT ILLNESS  Anne Marie Romero is a 29 y.o. female. HPI  Patient with PMHx significant for thrombocytopenia, depression here today for a 3-week follow-up. She came in in early October for evaluation of bumps/nodules on her skin. She had several non-tender lumps under her skin at that time, and lab work (CBC, CMP, ESR, CRP) were unrevealing, as was a CXR. We started her on a course of doxycycline and a round of prednisone, which she has completed, but she has not appreciated any change in the nodules. They remain non-tender. No new symptoms, including fevers, chills, weight loss. After several months without a regular period, her menses came 10/15/18 for a regular cycle. She resumed the active pills last night. Visit Vitals  /82 (BP 1 Location: Left arm, BP Patient Position: Sitting)   Pulse 82   Temp 98.6 °F (37 °C) (Oral)   Resp 16   Ht 5' 7\" (1.702 m)   Wt 164 lb (74.4 kg)   LMP 10/15/2018 (Approximate)   SpO2 98%   BMI 25.69 kg/m²     Current Outpatient Medications on File Prior to Visit   Medication Sig Dispense Refill    hydrocortisone acetate 2.5 % rectal cream hydrocortisone 2.5 % topical cream with perineal applicator   INSERT INTO RECTUM 4 TIMES DAILY      acetaminophen (TYLENOL EXTRA STRENGTH) 500 mg tablet Take 1,000 mg by mouth every six (6) hours as needed for Pain. No current facility-administered medications on file prior to visit. Review of Systems   Constitutional: Negative for chills, fever and weight loss. Respiratory: Negative for cough and shortness of breath. Cardiovascular: Negative for chest pain and palpitations. Skin:        Lumps under skin   Neurological: Negative for weakness. Physical Exam   Constitutional: She is oriented to person, place, and time. She appears well-developed and well-nourished. HENT:   Head: Normocephalic and atraumatic. Neurological: She is alert and oriented to person, place, and time.    Skin: Skin is warm and dry. She is not diaphoretic. No erythema. Numerous non-tender subcutaneous nodules, < 1cm in diameter, on left ankle, right calf, right abdomen, and right breast; no fluctuance or streaking  Linear raised growth along left scapula palpable but not visible   Psychiatric: She has a normal mood and affect. Her behavior is normal. Judgment normal.   Nursing note and vitals reviewed. ASSESSMENT and PLAN    ICD-10-CM ICD-9-CM    1. Subcutaneous mass R22.9 782.2 REFERRAL TO GENERAL SURGERY   2. Dysmenorrhea N94.6 625.3 norethindrone-ethinyl estradiol-iron (BLISOVI FE 1.5/30, 28,) 1.5 mg-30 mcg (21)/75 mg (7) tab   3. Moderate episode of recurrent major depressive disorder (HCC) F33.1 296.32 traZODone (DESYREL) 100 mg tablet      buPROPion XL (WELLBUTRIN XL) 150 mg tablet     1. Subcutaneous masses - With no improvement on doxycycline or prednisone, will send to general surgery for further evaluation. Referral placed. 2. Depression - Well-controlled on wellbutrin and trazodone; refills provided. Return in 8 months for CPE, sooner PRN. Follow-up Disposition:  Return in about 8 months (around 6/23/2019) for CPE, sooner PRN.

## 2018-10-23 NOTE — PROGRESS NOTES
Chief Complaint   Patient presents with    Follow-up     routine     Patient here for routine follow up. No ER or specialists since last visit.

## 2018-11-05 ENCOUNTER — TELEPHONE (OUTPATIENT)
Dept: FAMILY MEDICINE CLINIC | Age: 33
End: 2018-11-05

## 2018-11-05 DIAGNOSIS — N94.6 DYSMENORRHEA: Primary | ICD-10-CM

## 2018-11-05 RX ORDER — DROSPIRENONE AND ETHINYL ESTRADIOL 0.02-3(28)
1 KIT ORAL DAILY
Qty: 1 PACKAGE | Refills: 5 | Status: SHIPPED | OUTPATIENT
Start: 2018-11-05 | End: 2019-05-03 | Stop reason: ALTCHOICE

## 2018-11-05 NOTE — TELEPHONE ENCOUNTER
Patient notified of new birth control. Patient instructed if this does not regulate patient's menses then we will need to send her to gyn. Patient verbalized understanding.

## 2018-11-05 NOTE — TELEPHONE ENCOUNTER
Patient concerned with birth control. Per patient she is taking it as directed. Medication is to help regulate menses but per patient she is still having irregular menses and has been bleeding for two weeks now with clots. Patient states she is open to new method as this is not working per patient. Patient given number to surgeon Barbara Reese MD for scheduling appointment. Will forward message to pcp for review and contact patient with instructions. Patient verbalized understanding.

## 2018-11-05 NOTE — TELEPHONE ENCOUNTER
Pt p# [phone], pt requesting to speak with nurse , states she has a question and concern about the birth control shes on.

## 2018-11-05 NOTE — TELEPHONE ENCOUNTER
Rx sent for Olga. Patient should switch to this (she can start as soon as she picks it up) and give it 3 months to have irregular bleeding even out. If symptoms persist after that time, I will send her to GYN. Thanks!   CAB

## 2018-11-26 ENCOUNTER — OFFICE VISIT (OUTPATIENT)
Dept: SURGERY | Age: 33
End: 2018-11-26

## 2018-11-26 VITALS
DIASTOLIC BLOOD PRESSURE: 81 MMHG | RESPIRATION RATE: 20 BRPM | BODY MASS INDEX: 25.93 KG/M2 | HEIGHT: 67 IN | SYSTOLIC BLOOD PRESSURE: 124 MMHG | HEART RATE: 87 BPM | OXYGEN SATURATION: 99 % | WEIGHT: 165.2 LBS

## 2018-11-26 DIAGNOSIS — L98.9 SKIN LESIONS: Primary | ICD-10-CM

## 2018-11-26 NOTE — PROGRESS NOTES
Surgery Consult:  Skin lesions  Requesting physician:  Dr. Caio Brian:   Patient 34 y.o.  female presents with multiple palpable skin lesions in the back, lower extremity, and right breast.  Patient first noticed the lumps 8 months ago in the left upper back which gradually spread almost like snake like fashion. Patient noted similar lumps in other areas in the right leg, right thigh, and right breast.  These lesions are not painful or pruritis. Denies any recent trauma or history of infection. No F/C/S.          Past Medical & Surgical History:  Past Medical History:   Diagnosis Date    Abnormal Papanicolaou smear of cervix     Acute pyelonephritis 3/22/2018    Anemia     Anxiety     Coagulation disorder ([facility] [address]     thrombocytopenia with pregnancy's    Depression     Disease of blood and blood forming organ     thrombocytopenia    Fetal heart disease 2017      Past Surgical History:   Procedure Laterality Date     DELIVERY ONLY      HX  SECTION  2017    HX GYN  10/2015        HX HEENT      T&A       Social History:  Social History     Socioeconomic History    Marital status:      Spouse name: Stephen Quintanilla Number of children: Not on file    Years of education: Not on file    Highest education level: Not on file   Social Needs    Financial resource strain: Not on file    Food insecurity - worry: Not on file    Food insecurity - inability: Not on file   [employer] needs - medical: Not on file   [employer] needs - non-medical: Not on file   Occupational History    Not on file   Tobacco Use    Smoking status: Never Smoker    Smokeless tobacco: Never Used   Substance and Sexual Activity    Alcohol use: Yes     Comment: weekends    Drug use: No    Sexual activity: Yes     Partners: Male     Birth control/protection: Pill   Other Topics Concern    Not on file   Social History Narrative    Not on file        Family History:  Family History   Problem Relation Age of Onset    No Known Problems Mother     No Known Problems Father         Medications:  Current Outpatient Medications   Medication Sig    drospirenone-ethinyl estradiol (MARCELO) 3-0.02 mg tab Take 1 Tab by mouth daily.  hydrocortisone acetate 2.5 % rectal cream hydrocortisone 2.5 % topical cream with perineal applicator   INSERT INTO RECTUM 4 TIMES DAILY    traZODone (DESYREL) 100 mg tablet Take 1 Tab by mouth nightly.  buPROPion XL (WELLBUTRIN XL) 150 mg tablet Take 1 Tab by mouth every morning.  acetaminophen (TYLENOL EXTRA STRENGTH) 500 mg tablet Take 1,000 mg by mouth every six (6) hours as needed for Pain. No current facility-administered medications for this visit. Allergies: Allergies   Allergen Reactions    Morphine Hives       Review of Systems  A comprehensive review of systems was negative except for that written in the HPI. Objective:     Exam:    Visit Vitals  /81 (BP 1 Location: Left arm, BP Patient Position: Sitting)   Pulse 87   Resp 20   Ht 5' 7\" (1.702 m)   Wt 74.9 kg (165 lb 3.2 oz)   SpO2 99%   BMI 25.87 kg/m²     General appearance: alert, cooperative, no distress, appears stated age  Lungs: clear to auscultation bilaterally  Heart: regular rate and rhythm  Abdomen: soft, non-tender. Non-distended. Extremities: extremities normal, atraumatic, no cyanosis or edema. MARYANN. Skin: Skin color, texture, turgor normal.  Left upper back with curvilinear subdermal skin lesion with purplish hue. No erythema or drainage. Non-tender. Right thigh with 1 x 1 cm subdermal non-tender lesion and right lateral lower leg with 1 x 3 cm subdermal non-tender lesion without erythema. Right breast at 8 o'clock with 1 x 1 cm subdermal non-tender lesion without erythema.     Neurologic: Grossly normal    Assessment:     Multiple subdermal lesions    Plan:     Biopsy of right breast and right leg lesion  Risks, benefit, and alternative to surgery was discussed with the patient. The risks of surgery include but not limited to infection, bleeding, recurrence, and the risks of anesthetic. Patient is agreeable to surgery but will call back to schedule surgery after checking with work. All questions answered.

## 2018-11-28 ENCOUNTER — TELEPHONE (OUTPATIENT)
Dept: SURGERY | Age: 33
End: 2018-11-28

## 2018-11-28 NOTE — TELEPHONE ENCOUNTER
Patient calling again, wants to speak with nurse regarding scheduling biopses at the office. Please return call today.

## 2018-11-28 NOTE — TELEPHONE ENCOUNTER
Per Dr Symone Oropeza. Biopsy scheduled for office on 12/5/18. Patient agreeable. All questions answered .

## 2018-11-28 NOTE — TELEPHONE ENCOUNTER
Patient wants to schedule 2 biopsies as an office visit. She understood Dr Sawyer Segovia gave the option of having it done in office or the hospital. She wants it done in office.

## 2018-12-05 ENCOUNTER — OFFICE VISIT (OUTPATIENT)
Dept: SURGERY | Age: 33
End: 2018-12-05

## 2018-12-05 ENCOUNTER — HOSPITAL ENCOUNTER (OUTPATIENT)
Dept: LAB | Age: 33
Discharge: HOME | End: 2018-12-05

## 2018-12-05 VITALS
TEMPERATURE: 97.7 F | SYSTOLIC BLOOD PRESSURE: 121 MMHG | DIASTOLIC BLOOD PRESSURE: 85 MMHG | OXYGEN SATURATION: 98 % | RESPIRATION RATE: 16 BRPM | HEIGHT: 67 IN | WEIGHT: 160 LBS | HEART RATE: 94 BPM | BODY MASS INDEX: 25.11 KG/M2

## 2018-12-05 DIAGNOSIS — L98.9 SKIN LESION: Primary | ICD-10-CM

## 2018-12-05 DIAGNOSIS — N64.9 BREAST LESION: ICD-10-CM

## 2018-12-05 DIAGNOSIS — R52 PAIN: ICD-10-CM

## 2018-12-05 RX ORDER — OXYCODONE AND ACETAMINOPHEN 5; 325 MG/1; MG/1
1 TABLET ORAL
Qty: 30 TAB | Refills: 0 | Status: SHIPPED | OUTPATIENT
Start: 2018-12-05 | End: 2019-01-07 | Stop reason: ALTCHOICE

## 2018-12-05 NOTE — PROGRESS NOTES
Chief Complaint   Patient presents with    Procedure     Biopsy of right breast and leg lesion. 1. Have you been to the ER, urgent care clinic since your last visit? Hospitalized since your last visit? No    2. Have you seen or consulted any other health care providers outside of the [address] [place] [address] since your last visit? Include any pap smears or colon screening. Ortho, right hand injury.

## 2018-12-05 NOTE — PROGRESS NOTES
[facility] [address]  OFFICE PROCEDURE PROGRESS NOTE        Chart reviewed for the following:   Ghanshyam Abarca, have reviewed the History, Physical and updated the Allergic reactions for [employer]     TIME OUT performed immediately prior to start of procedure:   I, Lisbeth Apgar, have performed the following reviews on Winsome Pugh prior to the start of the procedure:            * Patient was identified by name and date of birth   * Agreement on procedure being performed was verified  * Risks and Benefits explained to the patient  * Procedure site verified and marked as necessary  * Patient was positioned for comfort  * Consent was signed and verified     Time: 11:25 AM      Date of procedure: 2018    Procedure performed by:  Devin Plaza MD    Provider assisted by:  MA    Patient assisted by: self    How tolerated by patient: tolerated the procedure well with no complications    Post Procedural Pain Scale: 2 - Hurts Little Bit    Comments: none

## 2018-12-05 NOTE — PROGRESS NOTES
Procedure Note:    1. Excisional biopsy of right breast dermal lesion  2. Punch biopsy of the left upper back skin lesion    After informed consent and time out was performed, patient's right breast was prepped and draped in standard surgical fashion. After local anesthetics, 3.5 cm elliptical incision was made around the dermal breast lesion at 9 o'clock and the lesion was excised using combination of electrocautery and sharp dissection. Specimen was sent off to the pathology. Good hemostasis was obtained. Incision was then closed in two layers. Subcutaneous layer was approximated using interrupted 3-0 Vicryl and skin was closed with 4-0 Vicryl subcuticular stitch. Steri strips followed by dry dressing were applied. Attention was then moved to the left upper back skin lesion. Patient's left upper back was prepped in standard surgical fashion. After local anesthetics, 6 mm punch biopsy of the left upper back skin lesion was performed. Specimen was sent off to the pathology. Good hemostasis was obtained. Skin was closed with interrupted 3-0 Nylon followed by dry dressing. Patient tolerated the procedure well.

## 2018-12-06 RX ORDER — ONDANSETRON 4 MG/1
4 TABLET, ORALLY DISINTEGRATING ORAL
Qty: 20 TAB | Refills: 0 | Status: SHIPPED | OUTPATIENT
Start: 2018-12-06 | End: 2019-01-07 | Stop reason: ALTCHOICE

## 2018-12-11 ENCOUNTER — TELEPHONE (OUTPATIENT)
Dept: SURGERY | Age: 33
End: 2018-12-11

## 2018-12-11 NOTE — TELEPHONE ENCOUNTER
Results of the pathology discussed with the patient. Path returned scleroderma. Instructed to f/u with PCP. All questions answered.

## 2018-12-12 ENCOUNTER — TELEPHONE (OUTPATIENT)
Dept: FAMILY MEDICINE CLINIC | Age: 33
End: 2018-12-12

## 2018-12-12 NOTE — TELEPHONE ENCOUNTER
Patient would like referral to rheumatologist who specializes in scleroderma which is a rare disease per patient's research. Per Dr. Kathy Bneedict telephone encounter note from yesterday patient was notified of results, all questions answered and to follow up with pcp. Patient notified that pcp will be consulted for instructions as we want to make sure we don't skip any steps that would not comply with patient's insurance. Patient has follow up appointment with Dr. Chelsea Diaz on 1/19/18.

## 2018-12-12 NOTE — TELEPHONE ENCOUNTER
----- Message from Minh Mcintosh sent at 12/12/2018  9:10 AM EST -----  Regarding: Arnoldo Greene/ [place]  Pt is requesting a call back in reference to the procedure she just got.  Best contact number: [phone]

## 2018-12-13 ENCOUNTER — TELEPHONE (OUTPATIENT)
Dept: FAMILY MEDICINE CLINIC | Age: 33
End: 2018-12-13

## 2018-12-13 ENCOUNTER — TELEPHONE (OUTPATIENT)
Dept: RHEUMATOLOGY | Age: 33
End: 2018-12-13

## 2018-12-13 DIAGNOSIS — M34.9 SCLERODERMA (HCC): Primary | ICD-10-CM

## 2018-12-13 NOTE — TELEPHONE ENCOUNTER
----- Message from Bravo Arechiga NP sent at 12/13/2018  4:06 PM EST -----  Regarding: FW: Scleroderma  KP -    Please make patient aware that she should be hearing from rheumatology for appt. I will place the referral to close the loop. Thanks! CAB    ----- Message -----  From: Pratibha Farfan MD  Sent: 12/13/2018  11:50 AM  To: Laz Pollack LPN, Bravo Arechiga NP, #  Subject: RE: Scleroderma                                  Yes, we can see them and so does Dr. Janeth Rosales, you can put on my schedule ramirez Mugdha's for next week.   ----- Message -----  From: Sarah López NP  Sent: 12/13/2018   9:15 AM  To: Ilana Olmstead MD, Tatum Geiger MD  Subject: Scleroderma                                      Dr. Sage Perkins Share,    Do you see patients with scleroderma (determined by biopsy)? I wasn't sure if all rheumatologists see patient with this diagnosis or if there is someone you can suggest for me to refer this patient to. Thanks!   TORIN Chin

## 2018-12-13 NOTE — TELEPHONE ENCOUNTER
Patient offered an appt with  on Dec 21 and several other appts, patient could not fit them into her scheduled. Appt is scheduled for Efrain Mathur@[employer].

## 2018-12-13 NOTE — TELEPHONE ENCOUNTER
Patient notified that we have reached out to [facility] for advisement for rheumatologist who specialized in scleroderma. Once we get an answer we will contact her back. Patient adamant that rheumatologist be well versed in this disease.   Patient notified we were

## 2018-12-13 NOTE — TELEPHONE ENCOUNTER
Patient notified that we are aware that patient that Dr. Armstrong Gain office is familiar with her diagnosis and will be seeing her. Patient very very concerned that they be very experienced with her diagnosis and will let pcp know if she is not comfortable with her visit.

## 2018-12-13 NOTE — TELEPHONE ENCOUNTER
----- Message from Yoanna Yi LPN sent at 29/53/9680  3:31 PM EST -----  Regarding: FW: Scleroderma      ----- Message -----  From: Jose Knox MD  Sent: 12/13/2018  11:50 AM  To: Yoanna Yi LPN, Sin Cervantes NP, #  Subject: RE: Scleroderma                                  Yes, we can see them and so does Dr. Jaime Colon, you can put on my schedule ramirez Ino's for next week.   ----- Message -----  From: Lisa Peters NP  Sent: 12/13/2018   9:15 AM  To: Chely Wynn MD, Ingrid Matias MD  Subject: Scleroderma                                      Dr. Moreno Puls Dr. Guevara Reveal,    Do you see patients with scleroderma (determined by biopsy)? I wasn't sure if all rheumatologists see patient with this diagnosis or if there is someone you can suggest for me to refer this patient to. Thanks!   TORIN Kingsley

## 2018-12-19 ENCOUNTER — OFFICE VISIT (OUTPATIENT)
Dept: SURGERY | Age: 33
End: 2018-12-19

## 2018-12-19 VITALS
TEMPERATURE: 98.1 F | HEART RATE: 74 BPM | DIASTOLIC BLOOD PRESSURE: 79 MMHG | SYSTOLIC BLOOD PRESSURE: 116 MMHG | WEIGHT: 162.2 LBS | BODY MASS INDEX: 25.46 KG/M2 | HEIGHT: 67 IN | OXYGEN SATURATION: 96 %

## 2018-12-19 DIAGNOSIS — M34.9 SCLERODERMA (HCC): Primary | ICD-10-CM

## 2018-12-19 NOTE — PROGRESS NOTES
Patient is here for follow-up. Patient underwent biopsy of skin lesions in her right breast and left upper back on 12/5/19. Doing well. Path reviewed with the patient. Patient is scheduled to see rheumatologist next month. PE:  Visit Vitals  /79 (BP 1 Location: Left arm, BP Patient Position: Sitting)   Pulse 74   Temp 98.1 °F (36.7 °C)   Ht 5' 7\" (1.702 m)   Wt 73.6 kg (162 lb 3.2 oz)   SpO2 96%   BMI 25.40 kg/m²     Skin:  Left upper and right breast Inc C/D/I.     Assessment:  S/p biopsy of skin lesions in her right breast and left upper back     Plan:  -f/u with rheumatologist as scheduled

## 2018-12-19 NOTE — PROGRESS NOTES
Chief Complaint   Patient presents with    Breast Problem     F/U EXCISION RT. BREAST DERMA LESION / BACK SKIN BIOPSY 12/5/18     1. Have you been to the ER, urgent care clinic since your last visit? Hospitalized since your last visit? NO    2. Have you seen or consulted any other health care providers outside of the [address] [place] [address] since your last visit? Include any pap smears or colon screening.  NO

## 2019-01-07 ENCOUNTER — OFFICE VISIT (OUTPATIENT)
Dept: RHEUMATOLOGY | Age: 34
End: 2019-01-07

## 2019-01-07 VITALS
RESPIRATION RATE: 16 BRPM | OXYGEN SATURATION: 97 % | DIASTOLIC BLOOD PRESSURE: 80 MMHG | HEART RATE: 82 BPM | TEMPERATURE: 98 F | HEIGHT: 67 IN | WEIGHT: 162 LBS | BODY MASS INDEX: 25.43 KG/M2 | SYSTOLIC BLOOD PRESSURE: 114 MMHG

## 2019-01-07 DIAGNOSIS — Z79.60 LONG-TERM USE OF IMMUNOSUPPRESSANT MEDICATION: ICD-10-CM

## 2019-01-07 DIAGNOSIS — L94.0 MORPHEA: Primary | ICD-10-CM

## 2019-01-07 DIAGNOSIS — K59.00 CONSTIPATION, UNSPECIFIED CONSTIPATION TYPE: ICD-10-CM

## 2019-01-07 NOTE — PROGRESS NOTES
Chief Complaint   Patient presents with    New Patient     establish care   1. Have you been to the ER, urgent care clinic since your last visit? Hospitalized since your last visit? No    2. Have you seen or consulted any other health care providers outside of the [address] [place] [address] since your last visit? Include any pap smears or colon screening.  No

## 2019-01-07 NOTE — PROGRESS NOTES
REASON FOR VISIT    This is the initial evaluation for Ms. Benjamin Eduardo a 34 y.o.  female for question of scleroderma. The patient is referred to the [facility] at the request of Dr. Anaid Quan. HISTORY OF PRESENT ILLNESS     This is a 34 y.o. female with hx of depression, thrombocytopenia, back skin masses along with a breast mass. Today, the patient complains of raised hard bumps on breast (s/p removal), abdomen, back. The patient notes that she has a lump in right breast, right abdomen, left upper back, right thigh, right calf. She has had these lesions over the past year and the new ones keep coming up. The ones on her abdomen and breast feel more nodular and the others feel more likely dry areas of skin. They don't bother her at all. NO itching, no pain. They are red.    + Raynauds in the extreme cold. Per the , she is more tired than is normal even with 2 kids. She endorses constipation. She sometimes has diarrhea as well. Random periods of nausea. NO heartburn. NO joint swelling but she feels joint pains in hands. She has pain upon waking up for an hour in feet and hands. With her first kids, during labor she had to get steroids which improved platelets. With the second child, steroids did not help and she had to had . REVIEW OF SYSTEMS    A 15 point review of systems was performed and summarized below. The questionnaire was reviewed with the patient and scanned into the patient's medical record.     General: endorses  Fatigue denies recent weight gain, recent weight loss,, weakness, fever, night sweats  Musculoskeletal: endorses  joint pain, morning stiffness denies joint swelling, , muscle pain  Ears:  denies ringing in ears, loss of hearing, deafness  Eyes:  denies pain, redness, loss of vision, double vision, blurred vision, dryness, foreign body sensation  Mouth: denies sore tongue, oral ulcers, bleeding gums, loss of taste, dryness, increased dental caries  Nose:denies nosebleeds, loss of smell, nasal ulcers  Throat:  denies frequent sore throats, hoarseness, difficulty in swallowing, pain in jaw while chewing  Neck:  denies swollen glands, tender glands  Cardiopulmonary:  denies pain in chest, irregular heart beat, sudden changes in heart beat, shortness of breath, difficulty breathing at night, dry cough, productive cough, coughing of blood, wheezing  Gastrointestinal:  denies nausea, heartburn, stomach pain relieved by food, vomiting of blood/\"coffee grounds\", jaundice, increasing constipation, persistent diarrhea, blood in stools, black stools  Genitourinary: denies nocturia, difficult urination, pain or burning on urination, blood in urine, cloudy urine, pus in urine, genital discharge, frequent urination, vaginal dryness, rash/ulcers, sexual difficulties  Hematologic: endorses anemia, bleeding tendencydenies , blood clots  Skin: endorses  easy bruising,rash, redness, nodules/bumps color changes of hands or feet in the cold (Raynaud's),denies sun sensitive, hives, skin tightness, , hair loss,  nailbed changes, mechanics hands  Neurologic: endorses denies headaches, dizziness, muscle weakness, numbness or tingling in hands/feet, memory loss  Psychiatric: endorses denies depression, excessive worries, PTSD, Bipolar  Sleep: endorses poor sleep (7 hours) difficulty staying asleep   denies , denies snoring, apnea, daytime somnolence, difficulty falling asleep,     PAST MEDICAL HISTORY    Past Medical History:   Diagnosis Date    Abnormal Papanicolaou smear of cervix     Acute pyelonephritis 3/22/2018    Anemia     Anxiety     Coagulation disorder (HCC)     thrombocytopenia with pregnancy's    Depression     Disease of blood and blood forming organ     thrombocytopenia    Fetal heart disease 6/1/2017        Past Surgical History:   Procedure Laterality Date    BREAST SURGERY PROCEDURE UNLISTED  12/15/2018    DR. LORENZO SANABRIA   DELIVERY ONLY      HX  SECTION  2017    HX GYN  10/2015        HX HEENT      T&A       FAMILY HISTORY    Family History   Problem Relation Age of Onset    No Known Problems Mother     No Known Problems Father        SOCIAL HISTORY    Social History     Tobacco Use    Smoking status: Never Smoker    Smokeless tobacco: Never Used   Substance Use Topics    Alcohol use: Yes     Comment: weekends    Drug use: No     NO pregnancy losses  MEDICATIONS    Current Outpatient Medications   Medication Sig Dispense Refill    drospirenone-ethinyl estradiol (MARCELO) 3-0.02 mg tab Take 1 Tab by mouth daily. 1 Package 5    traZODone (DESYREL) 100 mg tablet Take 1 Tab by mouth nightly. 30 Tab 6    buPROPion XL (WELLBUTRIN XL) 150 mg tablet Take 1 Tab by mouth every morning. 30 Tab 6       ALLERGIES    Allergies   Allergen Reactions    Morphine Hives       PHYSICAL EXAMINATION    Visit Vitals  /80 (BP 1 Location: Right arm, BP Patient Position: Sitting)   Pulse 82   Temp 98 °F (36.7 °C) (Oral)   Resp 16   Ht 5' 7\" (1.702 m)   Wt 162 lb (73.5 kg)   SpO2 97%   BMI 25.37 kg/m²     Body mass index is 25.37 kg/m². General: NAD  HEENT: PERRL, anicteric, non-injected sclerae; oropharynx without ulcers, erythema, or exudate. Moist mucous membranes. Lymphatic: No cervical or axillary lymphadenopathy. Cardiovascular: S1, S2,no R/M/G  Pulmonary: CTA b/l. No wheezes/rales/rhonchi. Abdominal: Soft,NTND, + BS. Skin:   Skin tightening with scaliness and redness:   Left upper back lesions: 3 inches x 5 inches  Multiple scattered lesions on lateral right thigh: 3/4 inches  Linear lesion on right calf:  2 inches long  A small round nodular lesion on right abdomen  Underlying erythema on them   Normal nailfold capillaroscopy    Neuro: Alert; able to carry normal conversation    Musculoskeletal:   Cervical & Lumbar Spine  Neck and spine have no noted deformities or signs of inflammation. Curvature of cervical, thoracic, and lumbar spine are within normal limits. Wrist, Hand, & Fingers  No bony deformities, inflammation, or tenderness of bony prominences. No anatomical snuff box tenderness; Full ROM in DIP, PIP, MCP, & carpal joints & with supination and pronation. Elbow  No bony deformities, inflammation, or tenderness in olecranon, medial, lateral epicondyle elbow. Full ROM upon flexion and extension. Shoulder  No bony deformities, inflammation, or tenderness in rotator cuff, biceps tendon, or acromioclavicular joint. Full ROM and strength in shoulder upon adduction, abduction, internal and external rotation. Hip  No bony deformities, inflammation, or tenderness in hip joint. Knee  FROM of the knee. NO swelling or warmth noted. No bony deformity noted    Ankle/toes  No bony deformities, inflammation or tenderness      DATA REVIEW    Prior medical records were reviewed and if applicable are summarized as below:    Labs:   10/2018: CRP, ESR, cmp normal, Plt 114    Imaging:   N/A    Pathology:  Right breast mass and left upper back skin biopsy (12/2018): localized scleroderma (morphea). ASSESSMENT AND PLAN    A 34 y.o. female with hx of thrombocytopenia presents for evaluation of localized scleroderma. The patient has biopsy proven morphea. There does not appear to be any systemic involvement at this time but this needs to be evaluated further to confirm. # Localized scleroderma (Morphea):   - DAYTON and ENAs  - SCL-70, RNA Poly III  - DMITRIY-1, myositis panel  - CXR, CT chest high res, PFTs, to assess for pulmonary involvement  - TTE for possible pHTN  - will plan to initiate her on subQ methotrexate along with prednisone 20 mg oral daily at next visit. She will likely need treatment for 2-3 years depending on her progress.      # Med toxicity:  - start on calcium and Vitamin D at next visit  - hepatitis panel and quant gold today      The patient voiced understanding of the aforementioned assessment and plan. Summary of plan was provided in the After Visit Summary patient instructions. I also provided education about MyChart setup and utility. Ms. Kenzie Chandler has a reminder for a \"due or due soon\" health maintenance. I have asked that she contact her primary care provider for follow-up on this health maintenance. TODAY'S ORDERS    No orders of the defined types were placed in this encounter.       Future Appointments   Date Time Provider Department Center   2/12/2019  3:30 PM Efren Claros MD [facility] Mami Morris MD    Adult Rheumatology   [facility]  [facility], [address] [place] [address]   Phone [phone]  Fax [phone]

## 2019-01-09 LAB
GAMMA INTERFERON BACKGROUND BLD IA-ACNC: 0.02 IU/ML
M TB IFN-G BLD-IMP: NEGATIVE
M TB IFN-G CD4+ BCKGRND COR BLD-ACNC: 0.16 IU/ML
MITOGEN IGNF BLD-ACNC: >10 IU/ML
QUANTIFERON INCUBATION, QF1T: NORMAL
QUANTIFERON TB2 AG: 0.11 IU/ML
SERVICE CMNT-IMP: NORMAL

## 2019-01-14 LAB
ALBUMIN SERPL-MCNC: 4.2 G/DL (ref 3.5–5.5)
ALBUMIN/GLOB SERPL: 1.7 {RATIO} (ref 1.2–2.2)
ALP SERPL-CCNC: 46 IU/L (ref 39–117)
ALT SERPL-CCNC: 11 IU/L (ref 0–32)
ANA TITR SER IF: NEGATIVE {TITER}
AST SERPL-CCNC: 16 IU/L (ref 0–40)
BASOPHILS # BLD AUTO: 0 X10E3/UL (ref 0–0.2)
BASOPHILS NFR BLD AUTO: 0 %
BILIRUB SERPL-MCNC: 0.5 MG/DL (ref 0–1.2)
BNP SERPL-MCNC: 22.2 PG/ML (ref 0–100)
BUN SERPL-MCNC: 12 MG/DL (ref 6–20)
BUN/CREAT SERPL: 11 (ref 9–23)
CALCIUM SERPL-MCNC: 8.7 MG/DL (ref 8.7–10.2)
CENTROMERE B AB SER-ACNC: <0.2 AI (ref 0–0.9)
CHLORIDE SERPL-SCNC: 104 MMOL/L (ref 96–106)
CK SERPL-CCNC: 53 U/L (ref 24–173)
CO2 SERPL-SCNC: 19 MMOL/L (ref 20–29)
COMMENT, 144067: NORMAL
CREAT SERPL-MCNC: 1.08 MG/DL (ref 0.57–1)
CREAT UR-MCNC: 196 MG/DL
EJ AB SER QL: NEGATIVE
ENA JO1 AB SER QL: NEGATIVE
ENA JO1 AB SER-ACNC: <0.2 AI (ref 0–0.9)
ENA RNP AB SER-ACNC: <0.2 AI (ref 0–0.9)
ENA SCL70 AB SER-ACNC: <0.2 AI (ref 0–0.9)
ENA SM AB SER-ACNC: <0.2 AI (ref 0–0.9)
ENA SS-A AB SER-ACNC: <0.2 AI (ref 0–0.9)
ENA SS-B AB SER-ACNC: <0.2 AI (ref 0–0.9)
EOSINOPHIL # BLD AUTO: 0.4 X10E3/UL (ref 0–0.4)
EOSINOPHIL NFR BLD AUTO: 5 %
ERYTHROCYTE [DISTWIDTH] IN BLOOD BY AUTOMATED COUNT: 13.3 % (ref 12.3–15.4)
GLOBULIN SER CALC-MCNC: 2.5 G/DL (ref 1.5–4.5)
GLUCOSE SERPL-MCNC: 97 MG/DL (ref 65–99)
HBV CORE AB SERPL QL IA: NEGATIVE
HBV CORE IGM SERPL QL IA: NEGATIVE
HBV E AB SERPL QL IA: NEGATIVE
HBV E AG SERPL QL IA: NEGATIVE
HBV SURFACE AB SER QL: REACTIVE
HBV SURFACE AG SERPL QL IA: NEGATIVE
HCT VFR BLD AUTO: 40 % (ref 34–46.6)
HCV AB S/CO SERPL IA: <0.1 S/CO RATIO (ref 0–0.9)
HGB BLD-MCNC: 13.4 G/DL (ref 11.1–15.9)
IMM GRANULOCYTES # BLD AUTO: 0 X10E3/UL (ref 0–0.1)
IMM GRANULOCYTES NFR BLD AUTO: 0 %
KU AB SER QL: NEGATIVE
LYMPHOCYTES # BLD AUTO: 2.2 X10E3/UL (ref 0.7–3.1)
LYMPHOCYTES NFR BLD AUTO: 24 %
MCH RBC QN AUTO: 31.6 PG (ref 26.6–33)
MCHC RBC AUTO-ENTMCNC: 33.5 G/DL (ref 31.5–35.7)
MCV RBC AUTO: 94 FL (ref 79–97)
MI2 AB SER QL: NEGATIVE
MONOCYTES # BLD AUTO: 0.5 X10E3/UL (ref 0.1–0.9)
MONOCYTES NFR BLD AUTO: 6 %
NEUTROPHILS # BLD AUTO: 5.8 X10E3/UL (ref 1.4–7)
NEUTROPHILS NFR BLD AUTO: 65 %
OJ AB SER QL: NEGATIVE
PL12 AB SER QL: NEGATIVE
PL7 AB SER QL: NEGATIVE
PLATELET # BLD AUTO: 120 X10E3/UL (ref 150–379)
POTASSIUM SERPL-SCNC: 4.1 MMOL/L (ref 3.5–5.2)
PROT SERPL-MCNC: 6.7 G/DL (ref 6–8.5)
PROT UR-MCNC: 11.2 MG/DL
PROT/CREAT UR: 57 MG/G CREAT (ref 0–200)
RBC # BLD AUTO: 4.24 X10E6/UL (ref 3.77–5.28)
RHEUMATOID FACT SERPL-ACNC: <10 IU/ML (ref 0–13.9)
RNAP III AB SER IA-ACNC: 11.6 UNITS/ML
SODIUM SERPL-SCNC: 139 MMOL/L (ref 134–144)
SRP AB SERPL QL: NEGATIVE
WBC # BLD AUTO: 8.9 X10E3/UL (ref 3.4–10.8)

## 2019-01-25 ENCOUNTER — HOSPITAL ENCOUNTER (OUTPATIENT)
Dept: NON INVASIVE DIAGNOSTICS | Age: 34
Discharge: HOME | End: 2019-01-25
Attending: INTERNAL MEDICINE
Payer: COMMERCIAL

## 2019-01-25 ENCOUNTER — HOSPITAL ENCOUNTER (OUTPATIENT)
Dept: CT IMAGING | Age: 34
Discharge: HOME | End: 2019-01-25
Attending: INTERNAL MEDICINE
Payer: COMMERCIAL

## 2019-01-25 ENCOUNTER — HOSPITAL ENCOUNTER (OUTPATIENT)
Dept: PULMONOLOGY | Age: 34
Discharge: HOME | End: 2019-01-25
Attending: INTERNAL MEDICINE
Payer: COMMERCIAL

## 2019-01-25 DIAGNOSIS — L94.0 MORPHEA: ICD-10-CM

## 2019-01-25 PROCEDURE — 93306 TTE W/DOPPLER COMPLETE: CPT

## 2019-01-25 PROCEDURE — 94729 DIFFUSING CAPACITY: CPT

## 2019-01-25 PROCEDURE — 71250 CT THORAX DX C-: CPT

## 2019-01-25 PROCEDURE — 94375 RESPIRATORY FLOW VOLUME LOOP: CPT

## 2019-01-25 PROCEDURE — 94726 PLETHYSMOGRAPHY LUNG VOLUMES: CPT

## 2019-02-12 ENCOUNTER — OFFICE VISIT (OUTPATIENT)
Dept: RHEUMATOLOGY | Age: 34
End: 2019-02-12

## 2019-02-12 VITALS
SYSTOLIC BLOOD PRESSURE: 121 MMHG | WEIGHT: 159.8 LBS | HEART RATE: 85 BPM | TEMPERATURE: 98.3 F | BODY MASS INDEX: 25.08 KG/M2 | OXYGEN SATURATION: 98 % | DIASTOLIC BLOOD PRESSURE: 80 MMHG | HEIGHT: 67 IN | RESPIRATION RATE: 16 BRPM

## 2019-02-12 DIAGNOSIS — Z79.60 LONG-TERM USE OF IMMUNOSUPPRESSANT MEDICATION: Primary | ICD-10-CM

## 2019-02-12 DIAGNOSIS — L94.0 MORPHEA: ICD-10-CM

## 2019-02-12 RX ORDER — PREDNISONE 20 MG/1
TABLET ORAL
Qty: 60 TAB | Refills: 1 | Status: SHIPPED | OUTPATIENT
Start: 2019-02-12 | End: 2019-05-03 | Stop reason: ALTCHOICE

## 2019-02-12 RX ORDER — MYCOPHENOLATE MOFETIL 500 MG/1
TABLET ORAL
Qty: 180 TAB | Refills: 3 | Status: SHIPPED | OUTPATIENT
Start: 2019-02-12 | End: 2019-10-14 | Stop reason: SDUPTHER

## 2019-02-12 NOTE — PROGRESS NOTES
REASON FOR VISIT    Patient presents for a follow up visit. HISTORY OF DISEASE: Localized scleroderma (Morphea)    Year of diagnosis:   First visit with me: 2019  Cumulative disease manifestations: Morphea patches  Positive serologies/labs:  Negative serologies/labs: Other co-morbidities: depression, thrombocytopenia  Relapses:      Past treatment history: none  Prednisone:   Non-biologic DMARD:   Biologic:  Cyclophosphamide:   Rituximab: Other:       HISTORY OF PRESENT ILLNESS     This is a 34 y.o. female with hx of depression, thrombocytopenia. MHAQ:0  Pain scale: 0    The patient notes that the lesion on back and leg are growing. They are not itchy. They are just dry. REVIEW OF SYSTEMS    A comprehensive review of systems was performed and pertinent results are documented in the HPI, review of systems is otherwise non-contributory. PAST MEDICAL HISTORY    Past Medical History:   Diagnosis Date    Abnormal Papanicolaou smear of cervix     Acute pyelonephritis 3/22/2018    Anemia     Anxiety     Coagulation disorder ([facility] [address]     thrombocytopenia with pregnancy's    Depression     Disease of blood and blood forming organ     thrombocytopenia    Fetal heart disease 2017        Past Surgical History:   Procedure Laterality Date    BREAST SURGERY PROCEDURE UNLISTED  12/15/2018    DR. LORENZO SANABRIA     DELIVERY ONLY      HX  SECTION  2017    HX GYN  10/2015        HX HEENT      T&A       FAMILY HISTORY    Family History   Problem Relation Age of Onset    No Known Problems Mother     No Known Problems Father        SOCIAL HISTORY    Social History     Tobacco Use    Smoking status: Never Smoker    Smokeless tobacco: Never Used   Substance Use Topics    Alcohol use: Yes     Comment: weekends    Drug use: No     NO pregnancy losses  MEDICATIONS    Current Outpatient Medications   Medication Sig Dispense Refill    predniSONE (DELTASONE) 20 mg tablet Take 20 mg daily for 1 month then 10 mg daily for 2 weeks then 5 mg daily for 2 weeks 60 Tab 1    mycophenolate (CELLCEPT) 500 mg tablet Start 1 tab twice daily for 7 days then 3 tabs daily for 7 days then 2 tabs twice daily for 7 days then 3 tabs twice daily 180 Tab 3    drospirenone-ethinyl estradiol (MARCELO) 3-0.02 mg tab Take 1 Tab by mouth daily. 1 Package 5    traZODone (DESYREL) 100 mg tablet Take 1 Tab by mouth nightly. 30 Tab 6    buPROPion XL (WELLBUTRIN XL) 150 mg tablet Take 1 Tab by mouth every morning. 30 Tab 6       ALLERGIES    Allergies   Allergen Reactions    Morphine Hives       PHYSICAL EXAMINATION    Visit Vitals  /80 (BP 1 Location: Left arm, BP Patient Position: Sitting)   Pulse 85   Temp 98.3 °F (36.8 °C) (Oral)   Resp 16   Ht 5' 7\" (1.702 m)   Wt 159 lb 12.8 oz (72.5 kg)   SpO2 98%   BMI 25.03 kg/m²     Body mass index is 25.03 kg/m². General: NAD  HEENT: PERRL, anicteric, non-injected sclerae; oropharynx without ulcers, erythema, or exudate. Moist mucous membranes. Lymphatic: No cervical or axillary lymphadenopathy. Cardiovascular: S1, S2,no R/M/G  Pulmonary: CTA b/l. No wheezes/rales/rhonchi. Abdominal: Soft,NTND, + BS. Skin:   Upper back: 13 x 8 cm  Right lateral le cm long  Small one on right abdomen which feels like a nodule  Back of right thigh 2-4 cm wide multiple round lesions    Neuro: Alert; able to carry normal conversation    Musculoskeletal:   Cervical & Lumbar Spine  Neck and spine have no noted deformities or signs of inflammation. Curvature of cervical, thoracic, and lumbar spine are within normal limits. Wrist, Hand, & Fingers  No bony deformities, inflammation, or tenderness of bony prominences. No anatomical snuff box tenderness; Full ROM in DIP, PIP, MCP, & carpal joints & with supination and pronation. Elbow  No bony deformities, inflammation, or tenderness in olecranon, medial, lateral epicondyle elbow.  Full ROM upon flexion and extension. Shoulder  No bony deformities, inflammation, or tenderness in rotator cuff, biceps tendon, or acromioclavicular joint. Full ROM and strength in shoulder upon adduction, abduction, internal and external rotation. Hip  No bony deformities, inflammation, or tenderness in hip joint. Knee  FROM of the knee. NO swelling or warmth noted. No bony deformity noted    Ankle/toes  No bony deformities, inflammation or tenderness    Physical Exam   Skin:                  DATA REVIEW    Prior medical records were reviewed and if applicable are summarized as below:    Labs:   1/2019: Quant gold, Hep B, C negative, BNP, UPCR, CK normal, smith/RNP, RF, SSa/B, SCL 70, RNA Polymerase III, myositis panel, DMITRIY-1, anti-centromere, DAYTON negative, Cr 1.08; LFTs wnl, Plt 120  10/2018: CRP, ESR, cmp normal, Plt 114    Imaging:   CXR (1/2019): negative  CT chest high res (1/2019): normal, b/l nephrolithiasis  TTE (1/2019): EF 60-65%    Pathology:  Right breast mass and left upper back skin biopsy (12/2018): localized scleroderma (morphea). ASSESSMENT AND PLAN    A 34 y.o. female with hx of thrombocytopenia, localized scleroderma presents for a follow up visit. Patient has only skin involvement and has rapid progression. # Localized scleroderma (Morphea):   - will put patient on prednisone 20 mg oral daily for 1 month, then 10 mg daily for 1 month then 5 mg daily for 1 month. -  I counseled the patient on the risk of avascular necrosis from corticosteroids. For each 20 mg of prednisone taken for over a month, the risk of AVN is 5%. It can also lead to weight gain, mood imbalances, osteoporosis, acne etc.   - will start patient on cellcept: 1000 mg daily for 1 week and if tolerated then 1500 mg daily for 1 week then 2000 mg daily for 1 week then 3000 mg daily. All risks and benefits explained to the patient and her  in detail.       # Med toxicity:  - start on calcium and Vitamin D   - hepatitis panel and quant gold negative 1/2019  - s/p tubal ligation per the patient  - cbc, cmp at next visit for cellcept      The patient voiced understanding of the aforementioned assessment and plan. Summary of plan was provided in the After Visit Summary patient instructions. I also provided education about MyChart setup and utility. Ms. Benjamin Eduardo has a reminder for a \"due or due soon\" health maintenance. I have asked that she contact her primary care provider for follow-up on this health maintenance. TODAY'S ORDERS    Orders Placed This Encounter    predniSONE (DELTASONE) 20 mg tablet    mycophenolate (CELLCEPT) 500 mg tablet       No future appointments.     Easton Miller MD    Adult Rheumatology   [facility]  [facility], [address] [place] [address]   Phone [phone]  Fax [phone]

## 2019-02-18 ENCOUNTER — DOCUMENTATION ONLY (OUTPATIENT)
Dept: RHEUMATOLOGY | Age: 34
End: 2019-02-18

## 2019-02-18 NOTE — PROGRESS NOTES
Spoke with patient, states she had spoke with Dr. Windy Díaz on yesterday. States he told her to stop the prednisone due to her being confused. Pt states she doesn't have any confusion today. States she has congestion and stuffy nose. Pt want to know if she should re-start prednisone. Dr. Windy Díaz notified. Pt informed to stop prednisone and hold cellcept until cold sx subside per Dr. Windy Díaz. Pt verbalized understanding.  Pt would like for  to give her a call tomorrow

## 2019-02-19 RX ORDER — NITROFURANTOIN 25; 75 MG/1; MG/1
100 CAPSULE ORAL 2 TIMES DAILY
Qty: 10 CAP | Refills: 0 | Status: SHIPPED | OUTPATIENT
Start: 2019-02-19 | End: 2019-02-26 | Stop reason: ALTCHOICE

## 2019-02-20 NOTE — PROCEDURES
[address]  PULMONARY FUNCTION TEST    Name:  Raeann Avila  MR#:  [id number]  :  1985  ACCOUNT #:  [de-identified]  DATE OF SERVICE:  2019    REASON FOR THE TEST:  Dyspnea. Spirometry and lung volumes were performed, and they revealed:  1. No airflow obstruction. 2.  No evidence of restrictive lung disease. 3.  Normal DLCO. 4.  Normal flow-volume loop.       Sindi Valadez MD      [id number]  D:  2019 11:07  T:  2019 6:56  JOB #:  [id number]  CC:  Domenic Concepcion MD

## 2019-02-26 ENCOUNTER — OFFICE VISIT (OUTPATIENT)
Dept: FAMILY MEDICINE CLINIC | Age: 34
End: 2019-02-26

## 2019-02-26 VITALS
OXYGEN SATURATION: 96 % | HEART RATE: 80 BPM | TEMPERATURE: 97.3 F | WEIGHT: 157.4 LBS | BODY MASS INDEX: 24.71 KG/M2 | HEIGHT: 67 IN | DIASTOLIC BLOOD PRESSURE: 79 MMHG | SYSTOLIC BLOOD PRESSURE: 121 MMHG | RESPIRATION RATE: 16 BRPM

## 2019-02-26 DIAGNOSIS — N30.00 ACUTE CYSTITIS WITHOUT HEMATURIA: Primary | ICD-10-CM

## 2019-02-26 DIAGNOSIS — F41.9 ANXIETY: ICD-10-CM

## 2019-02-26 DIAGNOSIS — R35.0 URINARY FREQUENCY: ICD-10-CM

## 2019-02-26 LAB
BACTERIA UA POCT, BACTPOCT: NORMAL
BILIRUB UR QL STRIP: NEGATIVE
CASTS UA POCT: NORMAL
CLUE CELLS, CLUEPOCT: NORMAL
CRYSTALS UA POCT, CRYSPOCT: NORMAL
EPITHELIAL CELLS POCT: NORMAL
GLUCOSE UR-MCNC: NEGATIVE MG/DL
KETONES P FAST UR STRIP-MCNC: NEGATIVE MG/DL
MUCUS UA POCT, MUCPOCT: NORMAL
PH UR STRIP: 6 [PH] (ref 4.6–8)
PROT UR QL STRIP: NEGATIVE
RBC UA POCT, RBCPOCT: NORMAL
SP GR UR STRIP: 1.01 (ref 1–1.03)
TRICH UA POCT, TRICHPOC: NORMAL
UA UROBILINOGEN AMB POC: NORMAL (ref 0.2–1)
URINALYSIS CLARITY POC: CLEAR
URINALYSIS COLOR POC: NORMAL
URINE BLOOD POC: NORMAL
URINE CULT COMMENT, POCT: NORMAL
URINE LEUKOCYTES POC: NORMAL
URINE NITRITES POC: POSITIVE
WBC UA POCT, WBCPOCT: NORMAL
YEAST UA POCT, YEASTPOC: NORMAL

## 2019-02-26 RX ORDER — MELATONIN
DAILY
COMMUNITY
End: 2019-07-05 | Stop reason: SDUPTHER

## 2019-02-26 RX ORDER — SULFAMETHOXAZOLE AND TRIMETHOPRIM 800; 160 MG/1; MG/1
1 TABLET ORAL 2 TIMES DAILY
Qty: 20 TAB | Refills: 0 | Status: SHIPPED | OUTPATIENT
Start: 2019-02-26 | End: 2019-03-08

## 2019-02-26 RX ORDER — NAPROXEN 500 MG/1
TABLET ORAL
COMMUNITY
Start: 2018-11-23 | End: 2019-02-26

## 2019-02-26 RX ORDER — PROMETHAZINE HYDROCHLORIDE 12.5 MG/1
TABLET ORAL
Refills: 0 | COMMUNITY
Start: 2018-12-07 | End: 2019-05-03 | Stop reason: ALTCHOICE

## 2019-02-26 RX ORDER — ONDANSETRON 4 MG/1
4 TABLET, ORALLY DISINTEGRATING ORAL
Qty: 15 TAB | Refills: 1 | Status: SHIPPED | OUTPATIENT
Start: 2019-02-26 | End: 2019-07-05 | Stop reason: SDUPTHER

## 2019-02-26 RX ORDER — BUSPIRONE HYDROCHLORIDE 7.5 MG/1
7.5 TABLET ORAL
Qty: 30 TAB | Refills: 1 | Status: SHIPPED | OUTPATIENT
Start: 2019-02-26 | End: 2019-03-27 | Stop reason: SDUPTHER

## 2019-02-26 RX ORDER — OXYCODONE AND ACETAMINOPHEN 5; 325 MG/1; MG/1
TABLET ORAL
Refills: 0 | COMMUNITY
Start: 2018-12-05 | End: 2019-02-26

## 2019-02-26 RX ORDER — ONDANSETRON 4 MG/1
TABLET, ORALLY DISINTEGRATING ORAL
Refills: 0 | COMMUNITY
Start: 2018-12-06 | End: 2019-02-26

## 2019-02-26 NOTE — PROGRESS NOTES
Chief Complaint   Patient presents with    Suprapubic Pressure     Patient here for lower abdominal pressure when urinating. Onset one week ago. Patient seen recently by rheumatology- given macrobid at visit - now complete. No ER visits.

## 2019-02-26 NOTE — PATIENT INSTRUCTIONS
Urinary Tract Infection in Women: Care Instructions  Your Care Instructions    A urinary tract infection, or UTI, is a general term for an infection anywhere between the kidneys and the urethra (where urine comes out). Most UTIs are bladder infections. They often cause pain or burning when you urinate. UTIs are caused by bacteria and can be cured with antibiotics. Be sure to complete your treatment so that the infection goes away. Follow-up care is a key part of your treatment and safety. Be sure to make and go to all appointments, and call your doctor if you are having problems. It's also a good idea to know your test results and keep a list of the medicines you take. How can you care for yourself at home? · Take your antibiotics as directed. Do not stop taking them just because you feel better. You need to take the full course of antibiotics. · Drink extra water and other fluids for the next day or two. This may help wash out the bacteria that are causing the infection. (If you have kidney, heart, or liver disease and have to limit fluids, talk with your doctor before you increase your fluid intake.)  · Avoid drinks that are carbonated or have caffeine. They can irritate the bladder. · Urinate often. Try to empty your bladder each time. · To relieve pain, take a hot bath or lay a heating pad set on low over your lower belly or genital area. Never go to sleep with a heating pad in place. To prevent UTIs  · Drink plenty of water each day. This helps you urinate often, which clears bacteria from your system. (If you have kidney, heart, or liver disease and have to limit fluids, talk with your doctor before you increase your fluid intake.)  · Urinate when you need to. · Urinate right after you have sex. · Change sanitary pads often. · Avoid douches, bubble baths, feminine hygiene sprays, and other feminine hygiene products that have deodorants.   · After going to the bathroom, wipe from front to back.  When should you call for help? Call your doctor now or seek immediate medical care if:    · Symptoms such as fever, chills, nausea, or vomiting get worse or appear for the first time.     · You have new pain in your back just below your rib cage. This is called flank pain.     · There is new blood or pus in your urine.     · You have any problems with your antibiotic medicine.    Watch closely for changes in your health, and be sure to contact your doctor if:    · You are not getting better after taking an antibiotic for 2 days.     · Your symptoms go away but then come back. Where can you learn more? Go to http://danna-rupesh.info/. Enter R195 in the search box to learn more about \"Urinary Tract Infection in Women: Care Instructions. \"  Current as of: March 20, 2018  Content Version: 11.9  © 2921-3980 [employer]. Care instructions adapted under license by [employer] (which disclaims liability or warranty for this information). If you have questions about a medical condition or this instruction, always ask your healthcare professional. [address] any warranty or liability for your use of this information.

## 2019-02-26 NOTE — PROGRESS NOTES
HISTORY OF PRESENT ILLNESS  King Mccord is a 34 y.o. female. HPI  Patient with PMHx significant for depression, scleroderma here today for an acute visit with CC of possible UTI. Symptoms started about a week ago, after she started prednisone and cellcept, started by her rheumatologist for her scleroderma. Has had urinary pressure/abdominal pressure, but denies dysuria, frequency, fevers, flank pain. Also endorses sensation of incomplete emptying. Has a history of pyelonephritis x 2. Asked her rheumatologist about it and just completed a 5-day course of macrobid. Symptoms improved, but then recurred afterward she finished the antibiotics and restarted the prednisone. Also wondering about something for her anxiety. Has been well-controlled on wellbutrin and trazodone, but recent addition of prednisone and new changes to her health have increased her anxiety levels. Was given #12 x 0 xanax last summer and just took her last pill, but wondering if there's anything she can take instead of that as needed for her anxiety. Visit Vitals  /79 (BP 1 Location: Right arm, BP Patient Position: Sitting)   Pulse 80   Temp 97.3 °F (36.3 °C) (Oral)   Resp 16   Ht 5' 7\" (1.702 m)   Wt 157 lb 6.4 oz (71.4 kg)   LMP  (LMP Unknown)   SpO2 96%   Breastfeeding? No Comment: MARCELO   BMI 24.65 kg/m²     Current Outpatient Medications on File Prior to Visit   Medication Sig Dispense Refill    cholecalciferol (VITAMIN D3) 1,000 unit tablet Take  by mouth daily.  predniSONE (DELTASONE) 20 mg tablet Take 20 mg daily for 1 month then 10 mg daily for 2 weeks then 5 mg daily for 2 weeks 60 Tab 1    mycophenolate (CELLCEPT) 500 mg tablet Start 1 tab twice daily for 7 days then 3 tabs daily for 7 days then 2 tabs twice daily for 7 days then 3 tabs twice daily 180 Tab 3    drospirenone-ethinyl estradiol (MARCELO) 3-0.02 mg tab Take 1 Tab by mouth daily.  1 Package 5    traZODone (DESYREL) 100 mg tablet Take 1 Tab by mouth nightly. 30 Tab 6    buPROPion XL (WELLBUTRIN XL) 150 mg tablet Take 1 Tab by mouth every morning. 30 Tab 6    promethazine (PHENERGAN) 12.5 mg tablet take 1 tablet by mouth every 6 hours if needed for nausea  0     No current facility-administered medications on file prior to visit. Review of Systems   Constitutional: Negative for chills, fever and malaise/fatigue. Eyes: Negative for blurred vision and double vision. Respiratory: Negative for cough and shortness of breath. Cardiovascular: Negative for chest pain, palpitations, orthopnea and leg swelling. Gastrointestinal: Positive for abdominal pain. Genitourinary: Positive for urgency. Negative for dysuria, flank pain, frequency and hematuria. Neurological: Negative for dizziness and headaches. Physical Exam   Constitutional: She is oriented to person, place, and time. She appears well-developed and well-nourished. No distress. HENT:   Head: Normocephalic and atraumatic. Cardiovascular: Normal rate, regular rhythm and normal heart sounds. Pulmonary/Chest: Effort normal and breath sounds normal. No respiratory distress. She has no wheezes. Abdominal: There is tenderness in the suprapubic area. Neurological: She is alert and oriented to person, place, and time. Skin: Skin is warm and dry. She is not diaphoretic. Psychiatric: She has a normal mood and affect. Her behavior is normal. Judgment normal.   Nursing note and vitals reviewed. ASSESSMENT and PLAN    ICD-10-CM ICD-9-CM    1. Acute cystitis without hematuria N30.00 595.0 CULTURE, URINE      trimethoprim-sulfamethoxazole (BACTRIM DS, SEPTRA DS) 160-800 mg per tablet      ondansetron (ZOFRAN ODT) 4 mg disintegrating tablet   2. Anxiety F41.9 300.00 busPIRone (BUSPAR) 7.5 mg tablet   3. Urinary frequency R35.0 788.41 AMB POC URINALYSIS DIP STICK AUTO W/ MICRO       CULTURE, URINE     1. Acute cystitis - + nitrites, + leuks on in-office UA today. Failed macrobid. Send urine for culture and start bactrim DS BID x 10 days. ED precautions reviewed. 2. Anxiety - Start buspar 7.5mg TID PRN anxiety, though I doubt she will need it that frequently. Reassess at follow up. Return in 6 weeks for follow-up (was due Dec 2018), sooner PRN. Follow-up Disposition:  Return in about 6 weeks (around 4/9/2019) for follow up.

## 2019-02-28 LAB — BACTERIA UR CULT: NO GROWTH

## 2019-03-14 ENCOUNTER — TELEPHONE (OUTPATIENT)
Dept: RHEUMATOLOGY | Age: 34
End: 2019-03-14

## 2019-03-14 NOTE — TELEPHONE ENCOUNTER
----- Message from Antonio Pedersen MD sent at 3/14/2019  8:48 AM EDT -----  Regarding: follow up appointment  José Sanchez,  This patient never made her follow up appointment. Can you please call the patient and do that ?    Thanks

## 2019-03-27 DIAGNOSIS — F41.9 ANXIETY: ICD-10-CM

## 2019-03-27 NOTE — TELEPHONE ENCOUNTER
Last Visit: 2/26/19  Next Appt: 4/12/19  Previous Refill Encounter: 2/26-30+1    Requested Prescriptions     Pending Prescriptions Disp Refills    busPIRone (BUSPAR) 7.5 mg tablet 30 Tab 1     Sig: Take 1 Tab by mouth three (3) times daily as needed.  Indications: Repeated Episodes of Anxiety

## 2019-03-28 RX ORDER — BUSPIRONE HYDROCHLORIDE 7.5 MG/1
7.5 TABLET ORAL
Qty: 30 TAB | Refills: 1 | Status: SHIPPED | OUTPATIENT
Start: 2019-03-28 | End: 2019-05-03 | Stop reason: DRUGHIGH

## 2019-04-09 ENCOUNTER — OFFICE VISIT (OUTPATIENT)
Dept: RHEUMATOLOGY | Age: 34
End: 2019-04-09

## 2019-04-09 VITALS
SYSTOLIC BLOOD PRESSURE: 111 MMHG | DIASTOLIC BLOOD PRESSURE: 74 MMHG | HEIGHT: 67 IN | BODY MASS INDEX: 25.08 KG/M2 | OXYGEN SATURATION: 97 % | TEMPERATURE: 97.9 F | HEART RATE: 89 BPM | WEIGHT: 159.8 LBS | RESPIRATION RATE: 18 BRPM

## 2019-04-09 DIAGNOSIS — M34.9 SCLERODERMA (HCC): ICD-10-CM

## 2019-04-09 DIAGNOSIS — Z79.60 LONG-TERM USE OF IMMUNOSUPPRESSANT MEDICATION: ICD-10-CM

## 2019-04-09 DIAGNOSIS — D69.6 THROMBOCYTOPENIA (HCC): Primary | ICD-10-CM

## 2019-04-09 RX ORDER — ASCORBIC ACID 250 MG
TABLET ORAL DAILY
COMMUNITY
End: 2020-07-28

## 2019-04-09 RX ORDER — PREDNISONE 5 MG/1
5 TABLET ORAL DAILY
Qty: 30 TAB | Refills: 1 | Status: SHIPPED | OUTPATIENT
Start: 2019-04-09 | End: 2019-05-28 | Stop reason: SDUPTHER

## 2019-04-09 NOTE — PROGRESS NOTES
REASON FOR VISIT    Patient presents for a follow up visit. HISTORY OF DISEASE: Localized scleroderma (Morphea)    Year of diagnosis:   First visit with me: 2019  Cumulative disease manifestations: Morphea patches  Positive serologies/labs:  Negative serologies/labs: Other co-morbidities: depression, thrombocytopenia  Relapses:      Past treatment history: none  Prednisone:  Prednisone taper stopping at 5 mg daily  Non-biologic DMARD: Cellcept 3 gms daily (3/12/2018-present)  Biologic:  Cyclophosphamide:   Rituximab: Other:     HISTORY OF PRESENT ILLNESS     This is a 34 y.o. female with hx of depression, thrombocytopenia. MHAQ:0.125  Pain scale: 3/10  The patient is still on 20 mg of prednisone daily. She is now on 3 gms of cellcept. She gets lightheaded and dizzy from it. She gets tremors as well. She thinks she has trouble getting her words together. She feels some confusion. She gets severe cramps as well with the cellcept. She has cramps after taking the cellcept initially and then they resolve. They are usually in b/l lower quadrants and left upper quadrant. She thinks her leg lesions are improving. The  does not agree. They both think that the lesions are not getting larger at least.  No new lesions. REVIEW OF SYSTEMS    A comprehensive review of systems was performed and pertinent results are documented in the HPI, review of systems is otherwise non-contributory. PAST MEDICAL HISTORY    Past Medical History:   Diagnosis Date    Abnormal Papanicolaou smear of cervix     Acute pyelonephritis 3/22/2018    Anemia     Anxiety     Coagulation disorder ([address]     thrombocytopenia with pregnancy's    Depression     Disease of blood and blood forming organ     thrombocytopenia    Fetal heart disease 2017        Past Surgical History:   Procedure Laterality Date    BREAST SURGERY PROCEDURE UNLISTED  12/15/2018    DR. LORENZO SANABRIA     DELIVERY ONLY      HX  SECTION  2017    HX GYN  10/2015        HX HEENT      T&A       FAMILY HISTORY    Family History   Problem Relation Age of Onset    No Known Problems Mother     No Known Problems Father        SOCIAL HISTORY    Social History     Tobacco Use    Smoking status: Never Smoker    Smokeless tobacco: Never Used   Substance Use Topics    Alcohol use: Yes     Comment: weekends    Drug use: No     NO pregnancy losses  MEDICATIONS    Current Outpatient Medications   Medication Sig Dispense Refill    ascorbic acid, vitamin C, (VITAMIN C) 250 mg tablet Take  by mouth.  predniSONE (DELTASONE) 5 mg tablet Take 1 Tab by mouth daily. 30 Tab 1    busPIRone (BUSPAR) 7.5 mg tablet Take 1 Tab by mouth three (3) times daily as needed (anxiety). Indications: Repeated Episodes of Anxiety 30 Tab 1    promethazine (PHENERGAN) 12.5 mg tablet take 1 tablet by mouth every 6 hours if needed for nausea  0    cholecalciferol (VITAMIN D3) 1,000 unit tablet Take  by mouth daily.  ondansetron (ZOFRAN ODT) 4 mg disintegrating tablet Take 1 Tab by mouth every eight (8) hours as needed for Nausea. 15 Tab 1    predniSONE (DELTASONE) 20 mg tablet Take 20 mg daily for 1 month then 10 mg daily for 2 weeks then 5 mg daily for 2 weeks 60 Tab 1    mycophenolate (CELLCEPT) 500 mg tablet Start 1 tab twice daily for 7 days then 3 tabs daily for 7 days then 2 tabs twice daily for 7 days then 3 tabs twice daily 180 Tab 3    traZODone (DESYREL) 100 mg tablet Take 1 Tab by mouth nightly. 30 Tab 6    buPROPion XL (WELLBUTRIN XL) 150 mg tablet Take 1 Tab by mouth every morning. 30 Tab 6    drospirenone-ethinyl estradiol (MARCELO) 3-0.02 mg tab Take 1 Tab by mouth daily.  1 Package 5       ALLERGIES    Allergies   Allergen Reactions    Morphine Hives       PHYSICAL EXAMINATION    Visit Vitals  /74 (BP 1 Location: Right arm, BP Patient Position: Sitting)   Pulse 89   Temp 97.9 °F (36.6 °C) (Oral)   Resp 18   Ht 5' 7\" (1.702 m)   Wt 159 lb 12.8 oz (72.5 kg)   SpO2 97%   BMI 25.03 kg/m²     Body mass index is 25.03 kg/m². General: NAD  HEENT: PERRL, anicteric, non-injected sclerae; oropharynx without ulcers, erythema, or exudate. Moist mucous membranes. Lymphatic: No cervical or axillary lymphadenopathy. Cardiovascular: S1, S2,no R/M/G  Pulmonary: CTA b/l. No wheezes/rales/rhonchi. Abdominal: Soft,NTND, + BS. Skin:   Did not measure lesions today    Neuro: Alert; able to carry normal conversation    Musculoskeletal:   Cervical & Lumbar Spine  Neck and spine have no noted deformities or signs of inflammation. Curvature of cervical, thoracic, and lumbar spine are within normal limits. Wrist, Hand, & Fingers  No bony deformities, inflammation, or tenderness of bony prominences. No anatomical snuff box tenderness; Full ROM in DIP, PIP, MCP, & carpal joints & with supination and pronation. Elbow  No bony deformities, inflammation, or tenderness in olecranon, medial, lateral epicondyle elbow. Full ROM upon flexion and extension. Shoulder  No bony deformities, inflammation, or tenderness in rotator cuff, biceps tendon, or acromioclavicular joint. Full ROM and strength in shoulder upon adduction, abduction, internal and external rotation. Hip  No bony deformities, inflammation, or tenderness in hip joint. Knee  FROM of the knee. NO swelling or warmth noted.  No bony deformity noted    Ankle/toes  No bony deformities, inflammation or tenderness    Physical Exam   Skin:        Lesions appear smaller today             DATA REVIEW    Prior medical records were reviewed and if applicable are summarized as below:    Labs:   1/2019: Quant gold, Hep B, C negative, BNP, UPCR, CK normal, smith/RNP, RF, SSa/B, SCL 70, RNA Polymerase III, myositis panel, DMITRIY-1, anti-centromere, DAYTON negative, Cr 1.08; LFTs wnl, Plt 120  10/2018: CRP, ESR, cmp normal, Plt 114    Imaging:   CXR (1/2019): negative  CT chest high res (1/2019): normal, b/l nephrolithiasis  TTE (1/2019): EF 60-65%    Pathology:  Right breast mass and left upper back skin biopsy (12/2018): localized scleroderma (morphea). ASSESSMENT AND PLAN    A 34 y.o. female with hx of thrombocytopenia, localized scleroderma on cellcept 3 gms daily and prednisone 20 mg oral daily presents for a follow up visit. The lesions have stopped growing but have not resolved yet. She is having severe side effects from the prednisone. # Localized scleroderma (Morphea):   - decrease prednisone to 10 mg daily. If able to tolerate continue for 1 month then taper to 5 mg. If not able to tolerate, decrease to 5 mg in 1 week and take for 1 month. -  I counseled the patient on the risk of avascular necrosis from corticosteroids. For each 20 mg of prednisone taken for over a month, the risk of AVN is 5%. It can also lead to weight gain, mood imbalances, osteoporosis, acne etc.   - continue cellcept 3 gms daily for now. I advised the patient that her cramps are usually gas pain due to cellcept. Advised to take simethicone as needed for the pains. # Med toxicity:  - sOn calcium and Vitamin D   - hepatitis panel and quant gold negative 1/2019  - s/p tubal ligation per the patient  - cbc, cmp ordered today    The patient voiced understanding of the aforementioned assessment and plan. Summary of plan was provided in the After Visit Summary patient instructions. I also provided education about MyChart setup and utility. Ms. Patti Marshall has a reminder for a \"due or due soon\" health maintenance. I have asked that she contact her primary care provider for follow-up on this health maintenance.     TODAY'S ORDERS    Orders Placed This Encounter    CBC WITH AUTOMATED DIFF    METABOLIC PANEL, COMPREHENSIVE    ascorbic acid, vitamin C, (VITAMIN C) 250 mg tablet    predniSONE (DELTASONE) 5 mg tablet       Future Appointments   Date Time Provider Arlene Gamble   5/7/2019  3:00 PM Ramona Sathish Tracy NP [address]   6/12/2019  4:00 PM Sascha Arias  Mami Morris MD    Adult Rheumatology   [facility]  A Part of [address], [address] [place] [address] Road   Phone [phone]  Fax [phone]

## 2019-04-09 NOTE — PROGRESS NOTES
Chief Complaint   Patient presents with    Other     morphea     1. Have you been to the ER, urgent care clinic since your last visit? Hospitalized since your last visit? No    2. Have you seen or consulted any other health care providers outside of the [address] [place] [address] since your last visit? Include any pap smears or colon screening.  No

## 2019-04-10 LAB
ALBUMIN SERPL-MCNC: 4.2 G/DL (ref 3.5–5.5)
ALBUMIN/GLOB SERPL: 2.2 {RATIO} (ref 1.2–2.2)
ALP SERPL-CCNC: 42 IU/L (ref 39–117)
ALT SERPL-CCNC: 9 IU/L (ref 0–32)
AST SERPL-CCNC: 12 IU/L (ref 0–40)
BASOPHILS # BLD AUTO: 0 X10E3/UL (ref 0–0.2)
BASOPHILS NFR BLD AUTO: 0 %
BILIRUB SERPL-MCNC: 0.3 MG/DL (ref 0–1.2)
BUN SERPL-MCNC: 14 MG/DL (ref 6–20)
BUN/CREAT SERPL: 18 (ref 9–23)
CALCIUM SERPL-MCNC: 9.1 MG/DL (ref 8.7–10.2)
CHLORIDE SERPL-SCNC: 104 MMOL/L (ref 96–106)
CO2 SERPL-SCNC: 23 MMOL/L (ref 20–29)
CREAT SERPL-MCNC: 0.79 MG/DL (ref 0.57–1)
EOSINOPHIL # BLD AUTO: 0.3 X10E3/UL (ref 0–0.4)
EOSINOPHIL NFR BLD AUTO: 2 %
ERYTHROCYTE [DISTWIDTH] IN BLOOD BY AUTOMATED COUNT: 14.1 % (ref 12.3–15.4)
GLOBULIN SER CALC-MCNC: 1.9 G/DL (ref 1.5–4.5)
GLUCOSE SERPL-MCNC: 84 MG/DL (ref 65–99)
HCT VFR BLD AUTO: 38.8 % (ref 34–46.6)
HGB BLD-MCNC: 12.6 G/DL (ref 11.1–15.9)
IMM GRANULOCYTES # BLD AUTO: 0 X10E3/UL (ref 0–0.1)
IMM GRANULOCYTES NFR BLD AUTO: 0 %
LYMPHOCYTES # BLD AUTO: 2 X10E3/UL (ref 0.7–3.1)
LYMPHOCYTES NFR BLD AUTO: 13 %
MCH RBC QN AUTO: 31.1 PG (ref 26.6–33)
MCHC RBC AUTO-ENTMCNC: 32.5 G/DL (ref 31.5–35.7)
MCV RBC AUTO: 96 FL (ref 79–97)
MONOCYTES # BLD AUTO: 1.1 X10E3/UL (ref 0.1–0.9)
MONOCYTES NFR BLD AUTO: 7 %
NEUTROPHILS # BLD AUTO: 11.7 X10E3/UL (ref 1.4–7)
NEUTROPHILS NFR BLD AUTO: 78 %
PLATELET # BLD AUTO: 142 X10E3/UL (ref 150–379)
POTASSIUM SERPL-SCNC: 4.2 MMOL/L (ref 3.5–5.2)
PROT SERPL-MCNC: 6.1 G/DL (ref 6–8.5)
RBC # BLD AUTO: 4.05 X10E6/UL (ref 3.77–5.28)
SODIUM SERPL-SCNC: 142 MMOL/L (ref 134–144)
WBC # BLD AUTO: 15 X10E3/UL (ref 3.4–10.8)

## 2019-04-23 ENCOUNTER — OFFICE VISIT (OUTPATIENT)
Dept: FAMILY MEDICINE CLINIC | Age: 34
End: 2019-04-23

## 2019-04-23 ENCOUNTER — TELEPHONE (OUTPATIENT)
Dept: FAMILY MEDICINE CLINIC | Age: 34
End: 2019-04-23

## 2019-04-23 VITALS
SYSTOLIC BLOOD PRESSURE: 123 MMHG | HEIGHT: 67 IN | OXYGEN SATURATION: 99 % | RESPIRATION RATE: 20 BRPM | DIASTOLIC BLOOD PRESSURE: 95 MMHG | TEMPERATURE: 97.8 F | HEART RATE: 93 BPM | WEIGHT: 163.6 LBS | BODY MASS INDEX: 25.68 KG/M2

## 2019-04-23 DIAGNOSIS — B02.9 HERPES ZOSTER WITHOUT COMPLICATION: Primary | ICD-10-CM

## 2019-04-23 DIAGNOSIS — R21 RASH/SKIN ERUPTION: ICD-10-CM

## 2019-04-23 RX ORDER — BETAMETHASONE DIPROPIONATE 0.5 MG/G
CREAM TOPICAL 2 TIMES DAILY
Qty: 45 G | Refills: 0 | Status: SHIPPED | OUTPATIENT
Start: 2019-04-23 | End: 2019-05-03 | Stop reason: ALTCHOICE

## 2019-04-23 RX ORDER — VALACYCLOVIR HYDROCHLORIDE 1 G/1
1000 TABLET, FILM COATED ORAL 3 TIMES DAILY
Qty: 21 TAB | Refills: 0 | Status: SHIPPED | OUTPATIENT
Start: 2019-04-23 | End: 2019-04-30

## 2019-04-23 NOTE — PATIENT INSTRUCTIONS

## 2019-04-23 NOTE — PROGRESS NOTES
HISTORY OF PRESENT ILLNESS  Ilene Byrne is a 34 y.o. female. HPI patient present with the current medical history of scleroderma depressive disorder dysmenorrhea thrombocytopenia present with a skin rash with itchiness unfortunately patient could not see her provider at this time and no other daughter at this clinic was able to see him except for me, currently patient complaining of  Rash of the left buttock, Started few weeks ago not better tried alcohol washing and OTC antibiotic ointments, does tingles and is pain full, states that is expanding red, and ++=swelled up, evidence of small vesicles in clusters  rounds, fluid-filled tender with itchiness, lmp 2 wks ago, unfortunately patient has been on immunosuppressive therapy since mid February 2019 patient has had normal white blood cell before the new start of therapy unfortunately most recently approximately 5 days ago her white blood cell was rechecked and it was greater than 15 could indicate immunosuppressive side effect currently patient was told to follow-up with a dermatologist for further care      Current Outpatient Medications   Medication Sig Dispense Refill    OTHER Patient reported takes Calcium unsure of dosage      predniSONE (DELTASONE) 5 mg tablet Take 1 Tab by mouth daily. 30 Tab 1    busPIRone (BUSPAR) 7.5 mg tablet Take 1 Tab by mouth three (3) times daily as needed (anxiety). Indications: Repeated Episodes of Anxiety 30 Tab 1    promethazine (PHENERGAN) 12.5 mg tablet take 1 tablet by mouth every 6 hours if needed for nausea  0    cholecalciferol (VITAMIN D3) 1,000 unit tablet Take  by mouth daily.  ondansetron (ZOFRAN ODT) 4 mg disintegrating tablet Take 1 Tab by mouth every eight (8) hours as needed for Nausea.  15 Tab 1    mycophenolate (CELLCEPT) 500 mg tablet Start 1 tab twice daily for 7 days then 3 tabs daily for 7 days then 2 tabs twice daily for 7 days then 3 tabs twice daily 180 Tab 3    traZODone (DESYREL) 100 mg tablet Take 1 Tab by mouth nightly. 30 Tab 6    buPROPion XL (WELLBUTRIN XL) 150 mg tablet Take 1 Tab by mouth every morning. 30 Tab 6    ascorbic acid, vitamin C, (VITAMIN C) 250 mg tablet Take  by mouth daily.  predniSONE (DELTASONE) 20 mg tablet Take 20 mg daily for 1 month then 10 mg daily for 2 weeks then 5 mg daily for 2 weeks 60 Tab 1    drospirenone-ethinyl estradiol (MARCELO) 3-0.02 mg tab Take 1 Tab by mouth daily. 1 Package 5     Allergies   Allergen Reactions    Morphine Hives     Past Medical History:   Diagnosis Date    Abnormal Papanicolaou smear of cervix     Acute pyelonephritis 3/22/2018    Anemia     Anxiety     Coagulation disorder (H[facility])     thrombocytopenia with pregnancy's    Depression     Disease of blood and blood forming organ     thrombocytopenia    Fetal heart disease 2017    Localized scleroderma      Past Surgical History:   Procedure Laterality Date    BREAST SURGERY PROCEDURE UNLISTED  12/15/2018    DR. LORENZO SANABRIA     DELIVERY ONLY      HX  SECTION  2017    HX GYN  10/2015        HX HEENT      T&A     Family History   Problem Relation Age of Onset    No Known Problems Mother     No Known Problems Father      Social History     Tobacco Use    Smoking status: Never Smoker    Smokeless tobacco: Never Used   Substance Use Topics    Alcohol use: Yes     Comment: weekends      Lab Results   Component Value Date/Time    WBC 15.0 (H) 2019 10:22 AM    HGB 12.6 2019 10:22 AM    HCT 38.8 2019 10:22 AM    PLATELET 123 (L)  10:22 AM    MCV 96 2019 10:22 AM     Lab Results   Component Value Date/Time    Glucose 84 2019 10:22 AM    LDL, calculated 66 2018 02:17 PM    Creatinine 0.79 2019 10:22 AM      Lab Results   Component Value Date/Time    GFR est non- 2019 10:22 AM    GFR est  2019 10:22 AM    Creatinine 0.79 2019 10:22 AM    BUN 14 2019 10:22 AM    Sodium 142 04/09/2019 10:22 AM    Potassium 4.2 04/09/2019 10:22 AM    Chloride 104 04/09/2019 10:22 AM    CO2 23 04/09/2019 10:22 AM    Magnesium 1.6 03/25/2018 06:21 AM    Phosphorus 2.6 03/25/2018 06:21 AM        Review of Systems   Constitutional: Negative for chills and fever. HENT: Negative for ear pain and nosebleeds. Eyes: Negative for blurred vision, pain and discharge. Respiratory: Negative for shortness of breath. Cardiovascular: Negative for chest pain and leg swelling. Gastrointestinal: Negative for constipation, diarrhea, nausea and vomiting. Genitourinary: Negative for frequency. Musculoskeletal: Negative for joint pain. Skin: Negative for itching and rash. Neurological: Negative for headaches. Psychiatric/Behavioral: Negative for depression. The patient is not nervous/anxious. Physical Exam   Constitutional: She is oriented to person, place, and time. She appears well-developed and well-nourished. HENT:   Head: Normocephalic and atraumatic. Eyes: Conjunctivae and EOM are normal.   Neck: Normal range of motion. Neck supple. Cardiovascular: Normal rate, regular rhythm and normal heart sounds. No murmur heard. Pulmonary/Chest: Effort normal and breath sounds normal.   Abdominal: Soft. Bowel sounds are normal. She exhibits no distension. Musculoskeletal: Normal range of motion. She exhibits no edema. Lymphadenopathy:     She has no cervical adenopathy. Neurological: She is alert and oriented to person, place, and time. Skin: Skin is warm. Rash noted. There is erythema. Psychiatric: Her behavior is normal.   Nursing note and vitals reviewed. ASSESSMENT and PLAN  Diagnoses and all orders for this visit:    1. Herpes zoster without complication  -     valACYclovir (VALTREX) 1 gram tablet; Take 1 Tab by mouth three (3) times daily for 7 days.   -     betamethasone dipropionate (DIPROSONE) 0.05 % topical cream; Apply  to affected area two (2) times a day.    2. Rash/skin eruption  -     valACYclovir (VALTREX) 1 gram tablet; Take 1 Tab by mouth three (3) times daily for 7 days. -     betamethasone dipropionate (DIPROSONE) 0.05 % topical cream; Apply  to affected area two (2) times a day.     At this time patient was told to follow-up with a provider for further care and call rheumatologist for possible change of medication patient acknowledged understanding and agreed with today's recommendation was told to call if not better

## 2019-04-23 NOTE — TELEPHONE ENCOUNTER
Patient notified that she will be seeing Dr. Maximino Connors for possible shingles as pcp is pregnant and unable to see patient due to possible shingles. Patient verbalized understanding.

## 2019-04-23 NOTE — PROGRESS NOTES
Name and  verified        Chief Complaint   Patient presents with    Rash     left bottocks for 5 days ago and complaint pain     Patient stated rash on face/arms/and back two days ago which took Benadryl was non effective.

## 2019-04-24 ENCOUNTER — TELEPHONE (OUTPATIENT)
Dept: FAMILY MEDICINE CLINIC | Age: 34
End: 2019-04-24

## 2019-04-24 ENCOUNTER — TELEPHONE (OUTPATIENT)
Dept: RHEUMATOLOGY | Age: 34
End: 2019-04-24

## 2019-04-24 NOTE — TELEPHONE ENCOUNTER
----- Message from Noah Schroeder sent at 4/24/2019  3:59 PM EDT -----  Regarding: [facility] Greene/Paulie Request  Contact: [phone]  Pt requesting appt change, no availability on requested day (see below thread). ----- Message -----    Hello,    Unfortunately at this time IN[facility] Greene does not have any availability on the 3rd. I can forward your message to her nurse but I can not guarantee that an  appointment will become available . Would you like to keep your appointment scheduled for the 7th for now?    ===View-only below this line===      ----- Message -----     From: Gucci Lazaro     Sent: 4/24/2019  3:54 PM EDT       To: Patient Appointment Schedule Request Mailing List  Subject: Appointment Request    Appointment Request From: Gucci Lazaro    With Provider: Tammy Wang NP [[facility]]    Preferred Date Range: Any    Preferred Times: Any time    Reason for visit: Request an Appointment    Comments:  Can I please change my appointment on the 7th to Friday the 3rd of May?

## 2019-04-24 NOTE — TELEPHONE ENCOUNTER
I called the patient after I got her [employer] message today. She was diagnosed with shingles on buttocks yesterday by her PCP. She was put on valtrex and prednisone 10 mg oral daily. For the past 1-2 days she has also developed a more diffuse rash on her face, arms, abdomen. Per the patient these are raised bumps with no discoloration and they are irritating and sometimes itchy. While shingles can definitely be a reaction to cellcept, any medication we use to treated her scleroderma will theoretically have this risk. I explained this to the patient. Her other diffuse rash is likely not related to cellcept as she has been on this medication for almost 2 months now and the rash is new. It is likely a reaction to something else. I advised the patient to increase prednisone to 30 mg daily for the next 3 days then decrease back down to 10 mg daily. Continue cellcept for now. After the shingles flare resolves,  If her other rash continues, we can do a trial off of cellcept to see if the rash resolves. Patient agreed with the plan. She will send me photo of the rash on her face.

## 2019-05-03 ENCOUNTER — OFFICE VISIT (OUTPATIENT)
Dept: FAMILY MEDICINE CLINIC | Age: 34
End: 2019-05-03

## 2019-05-03 VITALS
HEIGHT: 67 IN | OXYGEN SATURATION: 100 % | DIASTOLIC BLOOD PRESSURE: 85 MMHG | WEIGHT: 158.6 LBS | BODY MASS INDEX: 24.89 KG/M2 | HEART RATE: 88 BPM | SYSTOLIC BLOOD PRESSURE: 128 MMHG | RESPIRATION RATE: 18 BRPM | TEMPERATURE: 97.6 F

## 2019-05-03 DIAGNOSIS — F33.1 MODERATE EPISODE OF RECURRENT MAJOR DEPRESSIVE DISORDER (HCC): Primary | ICD-10-CM

## 2019-05-03 DIAGNOSIS — F41.9 ANXIETY: ICD-10-CM

## 2019-05-03 RX ORDER — BUPROPION HYDROCHLORIDE 300 MG/1
300 TABLET ORAL
Qty: 30 TAB | Refills: 2 | Status: SHIPPED | OUTPATIENT
Start: 2019-05-03 | End: 2019-07-05 | Stop reason: SDUPTHER

## 2019-05-03 RX ORDER — BUSPIRONE HYDROCHLORIDE 10 MG/1
10 TABLET ORAL 2 TIMES DAILY
Qty: 60 TAB | Refills: 3 | Status: SHIPPED | OUTPATIENT
Start: 2019-05-03 | End: 2019-07-05 | Stop reason: SDUPTHER

## 2019-05-03 NOTE — PROGRESS NOTES
Chief Complaint   Patient presents with    Follow-up     Here for routine follow up. She has concerns about her PRN buspar. She feels she may need an adjustment on her wellbutrin as she is constantly tense. 1. Have you been to the ER, urgent care clinic since your last visit? Hospitalized since your last visit? No    2. Have you seen or consulted any other health care providers outside of the [address] [place] [address] since your last visit? Include any pap smears or colon screening.  No

## 2019-05-03 NOTE — PROGRESS NOTES
HISTORY OF PRESENT ILLNESS  Esther Linder is a 34 y.o. female. HPI  Here for depression follow-up.      Depression/Anxiety  Currently taking wellbutrin 150mg daily and buspar 7.5mg TID PRN anxiety, though she ran out of the buspar a few weeks ago. Was previously on effexor, but it caused low libido so she stopped it. She feels like she has been \"down in the dumps\" recently - but isn't sure what to chalk it up to. She is having a hard time calming down, constantly tense, worrying. Not having panic attacks, not to the point where she can't function. Has \"no chill mode. \"  Has difficulty staying asleep. Takes melatonin OTC PRN, which she thinks is working better for her. Denies SI/HI. PHQ-9 score of 4, FERNANDO score of 15 today. Wondering if she may benefit from taking the buspar on a scheduled basis instead of PRN, and increased dose of wellbutrin. She just left [facility] and is at [employer]; enjoys her new position. Scleroderma  Sees Dr. Dwaine Campbell at [address] [place] [address]. Currently taking cellcept and prednisone. Plan is to continue therapy for 2 - 3 years. Next visit June 2019. Thrombocytopenia - Hx of this, got worse during pregnancy. Saw heme-onc at [facility], but never got any follow-up set up there and isn't sure what the plan was. Saw Dr. Smiley Stevens, and plan is to monitor at this point. Most recent PLT count was 142  in April 2019 with Dr. Dwaine Campbell.      Hx HSV  Has been told that her bloodwork was positive for herpes, but has never had an outbreak.      Current Specialists:  1. Dr. Dwaine Campbell, [facility] rhe[facility] - next visit June 2019     Preventative Care  TDaP: May 2017  Pap smear: October 2018; NILM with neg HPV  Mammogram: has never had  Colonoscopy: has never had  Denies early family hx of breast or colon CA.      Healthy Habits  Patient is exercising 3 - 4x per week, running, and active with her kids.    Patient endorses healthy diet; avoids fatty/greasy foods, sugar-sweetened beverages. Denies tobacco use. Social alcohol use. Denies illicit drug use. Sexually active with 1 male partner in last 12 months.  has a vasectomy; denies concerns for STIs today. On OCPs for dysmenorrhea; increased dose May 2018 due to BTB. Past Medical History:   Diagnosis Date    Abnormal Papanicolaou smear of cervix     Acute pyelonephritis 3/22/2018    Anemia     Anxiety     Coagulation disorder ([facility]     thrombocytopenia with pregnancy's    Depression     Disease of blood and blood forming organ     thrombocytopenia    Fetal heart disease 2017    Localized scleroderma      Past Surgical History:   Procedure Laterality Date    BREAST SURGERY PROCEDURE UNLISTED  12/15/2018    DR. LORENZO SANABRIA     DELIVERY ONLY      HX  SECTION  2017    HX GYN  10/2015        HX HEENT      T&A     Family History   Problem Relation Age of Onset    No Known Problems Mother     No Known Problems Father      Social History     Socioeconomic History    Marital status:      Spouse name: Josiah Barrientos Number of children: Not on file    Years of education: Not on file    Highest education level: Not on file   Tobacco Use    Smoking status: Never Smoker    Smokeless tobacco: Never Used   Substance and Sexual Activity    Alcohol use: Yes     Comment: weekends    Drug use: No    Sexual activity: Yes     Partners: Male     Birth control/protection: Pill     Patient Active Problem List   Diagnosis Code    [facility] D69.[facility]    Previous  section complicating pregnancy U34.173    Dysmenorrhea N94.6    Moderate episode of recurrent major depressive disorder ([facility] F33.1    S[facility] M34.9     Outpatient Encounter Medications as of 5/3/2019   Medication Sig Dispense Refill    buPROPion XL (WELLBUTRIN XL) 300 mg XL tablet Take 1 Tab by mouth every morning.  30 Tab 2    busPIRone (BUSPAR) 10 mg tablet Take 1 Tab by mouth two (2) times a day. 60 Tab 3    OTHER Patient reported takes Calcium unsure of dosage      ascorbic acid, vitamin C, (VITAMIN C) 250 mg tablet Take  by mouth daily.  predniSONE (DELTASONE) 5 mg tablet Take 1 Tab by mouth daily. 30 Tab 1    cholecalciferol (VITAMIN D3) 1,000 unit tablet Take  by mouth daily.  ondansetron (ZOFRAN ODT) 4 mg disintegrating tablet Take 1 Tab by mouth every eight (8) hours as needed for Nausea. 15 Tab 1    mycophenolate (CELLCEPT) 500 mg tablet Start 1 tab twice daily for 7 days then 3 tabs daily for 7 days then 2 tabs twice daily for 7 days then 3 tabs twice daily (Patient taking differently: 1,500 mg. Start 1 tab twice daily for 7 days then 3 tabs daily for 7 days then 2 tabs twice daily for 7 days then 3 tabs twice daily) 180 Tab 3    traZODone (DESYREL) 100 mg tablet Take 1 Tab by mouth nightly. 30 Tab 6    [DISCONTINUED] betamethasone dipropionate (DIPROSONE) 0.05 % topical cream Apply  to affected area two (2) times a day. 45 g 0    [DISCONTINUED] busPIRone (BUSPAR) 7.5 mg tablet Take 1 Tab by mouth three (3) times daily as needed (anxiety). Indications: Repeated Episodes of Anxiety 30 Tab 1    [DISCONTINUED] promethazine (PHENERGAN) 12.5 mg tablet take 1 tablet by mouth every 6 hours if needed for nausea  0    [DISCONTINUED] predniSONE (DELTASONE) 20 mg tablet Take 20 mg daily for 1 month then 10 mg daily for 2 weeks then 5 mg daily for 2 weeks 60 Tab 1    [DISCONTINUED] drospirenone-ethinyl estradiol (MARCELO) 3-0.02 mg tab Take 1 Tab by mouth daily. 1 Package 5    [DISCONTINUED] buPROPion XL (WELLBUTRIN XL) 150 mg tablet Take 1 Tab by mouth every morning. 30 Tab 6     No facility-administered encounter medications on file as of 5/3/2019.       Visit Vitals  /85 (BP 1 Location: Left arm, BP Patient Position: Sitting)   Pulse 88   Temp 97.6 °F (36.4 °C) (Oral)   Resp 18   Ht 5' 7.01\" (1.702 m)   Wt 158 lb 9.6 oz (71.9 kg)   LMP 04/15/2019 (Approximate)   SpO2 100%   BMI 24.83 kg/m²         Review of Systems   Constitutional: Negative for chills, fever and malaise/fatigue. Eyes: Negative for blurred vision and double vision. Respiratory: Negative for cough and shortness of breath. Cardiovascular: Negative for chest pain, palpitations, orthopnea and leg swelling. Gastrointestinal: Negative for blood in stool. Genitourinary: Negative for hematuria. Neurological: Negative for dizziness and headaches. Psychiatric/Behavioral: Positive for depression. Negative for substance abuse and suicidal ideas. The patient is nervous/anxious and has insomnia. Physical Exam   Constitutional: She is oriented to person, place, and time. She appears well-developed and well-nourished. No distress. HENT:   Head: Normocephalic and atraumatic. Cardiovascular: Normal rate, regular rhythm and normal heart sounds. Pulmonary/Chest: Effort normal and breath sounds normal. No respiratory distress. She has no wheezes. Neurological: She is alert and oriented to person, place, and time. Skin: Skin is warm and dry. She is not diaphoretic. Psychiatric: She has a normal mood and affect. Her behavior is normal. Judgment normal.   Nursing note and vitals reviewed. ASSESSMENT and PLAN    ICD-10-CM ICD-9-CM    1. Moderate episode of recurrent major depressive disorder (HCC) F33.1 296.32 buPROPion XL (WELLBUTRIN XL) 300 mg XL tablet   2. Anxiety F41.9 300.00 buPROPion XL (WELLBUTRIN XL) 300 mg XL tablet      busPIRone (BUSPAR) 10 mg tablet     1. Depression/Anxiety - Increase dose of wellbutrin to 300mg daily. Increase buspar to 10mg BID scheduled instead of PRN. Asked her to let me know in 2 - 3 weeks via [employer] if anxiety is not improved, and we could increase to 3x daily if needed. Return in July 2019 for CPE, sooner PRN. Follow-up and Dispositions    · Return in about 2 months (around 7/3/2019) for CPE and lab/med f/u, sooner PRN.

## 2019-05-28 RX ORDER — PREDNISONE 5 MG/1
TABLET ORAL
Qty: 30 TAB | Refills: 1 | Status: SHIPPED | OUTPATIENT
Start: 2019-05-28 | End: 2019-06-12

## 2019-05-28 RX ORDER — PREDNISONE 5 MG/1
5 TABLET ORAL DAILY
Qty: 30 TAB | Refills: 1 | Status: SHIPPED | OUTPATIENT
Start: 2019-05-28 | End: 2019-05-31 | Stop reason: SDUPTHER

## 2019-05-31 ENCOUNTER — OFFICE VISIT (OUTPATIENT)
Dept: FAMILY MEDICINE CLINIC | Age: 34
End: 2019-05-31

## 2019-05-31 VITALS
RESPIRATION RATE: 14 BRPM | HEIGHT: 67 IN | HEART RATE: 85 BPM | SYSTOLIC BLOOD PRESSURE: 116 MMHG | TEMPERATURE: 98.1 F | WEIGHT: 163.8 LBS | BODY MASS INDEX: 25.71 KG/M2 | OXYGEN SATURATION: 99 % | DIASTOLIC BLOOD PRESSURE: 58 MMHG

## 2019-05-31 DIAGNOSIS — R10.84 GENERALIZED ABDOMINAL PAIN: Primary | ICD-10-CM

## 2019-05-31 DIAGNOSIS — R82.90 ABNORMAL URINALYSIS: ICD-10-CM

## 2019-05-31 LAB
BACTERIA UA POCT, BACTPOCT: ABNORMAL
BILIRUB UR QL STRIP: NEGATIVE
CASTS UA POCT: ABNORMAL
CLUE CELLS, CLUEPOCT: ABNORMAL
CRYSTALS UA POCT, CRYSPOCT: ABNORMAL
EPITHELIAL CELLS POCT: ABNORMAL
GLUCOSE UR-MCNC: NEGATIVE MG/DL
KETONES P FAST UR STRIP-MCNC: NEGATIVE MG/DL
MUCUS UA POCT, MUCPOCT: ABNORMAL
PH UR STRIP: 8.5 [PH] (ref 4.6–8)
PROT UR QL STRIP: NEGATIVE
RBC UA POCT, RBCPOCT: ABNORMAL
SP GR UR STRIP: 1.01 (ref 1–1.03)
TRICH UA POCT, TRICHPOC: ABNORMAL
UA UROBILINOGEN AMB POC: ABNORMAL (ref 0.2–1)
URINALYSIS CLARITY POC: CLEAR
URINALYSIS COLOR POC: YELLOW
URINE BLOOD POC: NEGATIVE
URINE CULT COMMENT, POCT: ABNORMAL
URINE LEUKOCYTES POC: NEGATIVE
URINE NITRITES POC: NEGATIVE
WBC UA POCT, WBCPOCT: ABNORMAL
YEAST UA POCT, YEASTPOC: ABNORMAL

## 2019-05-31 RX ORDER — NITROFURANTOIN 25; 75 MG/1; MG/1
100 CAPSULE ORAL 2 TIMES DAILY
Qty: 14 CAP | Refills: 0 | Status: SHIPPED | OUTPATIENT
Start: 2019-05-31 | End: 2019-06-07

## 2019-05-31 RX ORDER — DICYCLOMINE HYDROCHLORIDE 10 MG/1
10 CAPSULE ORAL
Qty: 28 CAP | Refills: 0 | Status: SHIPPED | OUTPATIENT
Start: 2019-05-31 | End: 2019-06-07

## 2019-05-31 NOTE — PROGRESS NOTES
Chief Complaint   Patient presents with    Abdominal Pain     6 days    patient states she has tried several OTC medications. Patient states she is not having a problem going she feel as if she is not completely emptying when going.

## 2019-05-31 NOTE — PATIENT INSTRUCTIONS
Please call [phone] to schedule your CT scan. Abdominal Pain: Care Instructions  Your Care Instructions    Abdominal pain has many possible causes. Some aren't serious and get better on their own in a few days. Others need more testing and treatment. If your pain continues or gets worse, you need to be rechecked and may need more tests to find out what is wrong. You may need surgery to correct the problem. Don't ignore new symptoms, such as fever, nausea and vomiting, urination problems, pain that gets worse, and dizziness. These may be signs of a more serious problem. Your doctor may have recommended a follow-up visit in the next 8 to 12 hours. If you are not getting better, you may need more tests or treatment. The doctor has checked you carefully, but problems can develop later. If you notice any problems or new symptoms, get medical treatment right away. Follow-up care is a key part of your treatment and safety. Be sure to make and go to all appointments, and call your doctor if you are having problems. It's also a good idea to know your test results and keep a list of the medicines you take. How can you care for yourself at home? · Rest until you feel better. · To prevent dehydration, drink plenty of fluids, enough so that your urine is light yellow or clear like water. Choose water and other caffeine-free clear liquids until you feel better. If you have kidney, heart, or liver disease and have to limit fluids, talk with your doctor before you increase the amount of fluids you drink. · If your stomach is upset, eat mild foods, such as rice, dry toast or crackers, bananas, and applesauce. Try eating several small meals instead of two or three large ones. · Wait until 48 hours after all symptoms have gone away before you have spicy foods, alcohol, and drinks that contain caffeine. · Do not eat foods that are high in fat.   · Avoid anti-inflammatory medicines such as aspirin, ibuprofen (Advil, Motrin), and naproxen (Aleve). These can cause stomach upset. Talk to your doctor if you take daily aspirin for another health problem. When should you call for help? Call 911 anytime you think you may need emergency care. For example, call if:    · You passed out (lost consciousness).     · You pass maroon or very bloody stools.     · You vomit blood or what looks like coffee grounds.     · You have new, severe belly pain.    Call your doctor now or seek immediate medical care if:    · Your pain gets worse, especially if it becomes focused in one area of your belly.     · You have a new or higher fever.     · Your stools are black and look like tar, or they have streaks of blood.     · You have unexpected vaginal bleeding.     · You have symptoms of a urinary tract infection. These may include:  ? Pain when you urinate. ? Urinating more often than usual.  ? Blood in your urine.     · You are dizzy or lightheaded, or you feel like you may faint.    Watch closely for changes in your health, and be sure to contact your doctor if:    · You are not getting better after 1 day (24 hours). Where can you learn more? Go to http://danna-rupesh.info/. Enter M723 in the search box to learn more about \"Abdominal Pain: Care Instructions. \"  Current as of: September 23, 2018  Content Version: 11.9  © 9432-1497 [employer]. Care instructions adapted under license by [employer] (which disclaims liability or warranty for this information). If you have questions about a medical condition or this instruction, always ask your healthcare professional. [address] any warranty or liability for your use of this information.

## 2019-05-31 NOTE — PROGRESS NOTES
HISTORY OF PRESENT ILLNESS  Jessica Alexander is a 34 y.o. female. HPI  Patient with PMHx significant for anxiety/depression, scleroderma here today for an acute visit with chief complaint of abdominal pain. She has been having symtpoms for 6 days. She has been having increased bloating, sharp abdominal pain after eating, and lower abdominal pain when walking. She reports that she is not urinating as she should; she does not have the urge to urinate as she feels like she should and then doesn't feel like her bladder is emptying all the way. No problems with defecation. Last bowel movement was this morning and was normal.   Has tried an OTC fiber powder along with Gas-X pills, which are not helping. No belching. She has woken up with exquisite abdominal pain for the past 2 nights that lasts a few minutes and resolves on its own. The pain is sharp and stabbing and is crippling. She ate 4 peanut butter crackers this morning and feels \"as big as a house. \" She has gained about 5 lbs in the past week. LMP 5/15/19. No new medications. No new foods. Still has all of her abdominal organs. Past Medical History:   Diagnosis Date    Abnormal Papanicolaou smear of cervix     Acute pyelonephritis 3/22/2018    Anemia     Anxiety     Coagulation disorder ([address]     thrombocytopenia with pregnancy's    Depression     Disease of blood and blood forming organ     thrombocytopenia    Fetal heart disease 2017    Localized scleroderma      Past Surgical History:   Procedure Laterality Date    BREAST SURGERY PROCEDURE UNLISTED  12/15/2018    DR. LORENZO SANABRIA     DELIVERY ONLY      HX  SECTION  2017    HX GYN  10/2015        HX HEENT      T&A     Family History   Problem Relation Age of Onset    No Known Problems Mother     No Known Problems Father      Social History     Socioeconomic History    Marital status:      Spouse name: Yury Mahajan Number of children: Not on file  Years of education: Not on file    Highest education level: Not on file   Tobacco Use    Smoking status: Never Smoker    Smokeless tobacco: Never Used   Substance and Sexual Activity    Alcohol use: Yes     Comment: weekends    Drug use: No    Sexual activity: Yes     Partners: Male     Birth control/protection: Pill     Patient Active Problem List   Diagnosis Code    Thrombocytopenia ([facility] D69.6    Previous  section complicating pregnancy S50.437    Dysmenorrhea N94.6    Moderate [facility] of recurrent major depressive disorder [facility] F33.1    Scleroderma ([facility] M34.9     Outpatient Encounter Medications as of 2019   Medication Sig Dispense Refill    dicyclomine (BENTYL) 10 mg capsule Take 1 Cap by mouth four (4) times daily as needed for Pain for up to 7 days. 28 Cap 0    nitrofurantoin, macrocrystal-monohydrate, (MACROBID) 100 mg capsule Take 1 Cap by mouth two (2) times a day for 7 days. 14 Cap 0    predniSONE (DELTASONE) 5 mg tablet take 1 tablet by mouth once daily 30 Tab 1    buPROPion XL (WELLBUTRIN XL) 300 mg XL tablet Take 1 Tab by mouth every morning. 30 Tab 2    busPIRone (BUSPAR) 10 mg tablet Take 1 Tab by mouth two (2) times a day. 60 Tab 3    OTHER Patient reported takes Calcium unsure of dosage      ascorbic acid, vitamin C, (VITAMIN C) 250 mg tablet Take  by mouth daily.  cholecalciferol (VITAMIN D3) 1,000 unit tablet Take  by mouth daily.  mycophenolate (CELLCEPT) 500 mg tablet Start 1 tab twice daily for 7 days then 3 tabs daily for 7 days then 2 tabs twice daily for 7 days then 3 tabs twice daily (Patient taking differently: 1,500 mg. Start 1 tab twice daily for 7 days then 3 tabs daily for 7 days then 2 tabs twice daily for 7 days then 3 tabs twice daily) 180 Tab 3    traZODone (DESYREL) 100 mg tablet Take 1 Tab by mouth nightly. 30 Tab 6    [DISCONTINUED] predniSONE (DELTASONE) 5 mg tablet Take 1 Tab by mouth daily.  30 Tab 1    ondansetron (ZOFRAN ODT) 4 mg disintegrating tablet Take 1 Tab by mouth every eight (8) hours as needed for Nausea. 15 Tab 1     No facility-administered encounter medications on file as of 5/31/2019. Visit Vitals  /58 (BP 1 Location: Left arm, BP Patient Position: Sitting)   Pulse 85   Temp 98.1 °F (36.7 °C) (Oral)   Resp 14   Ht 5' 7\" (1.702 m)   Wt 163 lb 12.8 oz (74.3 kg)   LMP 05/15/2019 (Approximate)   SpO2 99%   BMI 25.65 kg/m²         Review of Systems   Constitutional: Negative for chills, fever and malaise/fatigue. Eyes: Negative for blurred vision and double vision. Respiratory: Negative for cough and shortness of breath. Cardiovascular: Negative for chest pain, palpitations, orthopnea and leg swelling. Gastrointestinal: Positive for abdominal pain. Negative for blood in stool, constipation, diarrhea, heartburn, melena, nausea and vomiting. Genitourinary: Positive for urgency. Negative for dysuria, flank pain, frequency and hematuria. Neurological: Negative for dizziness and headaches. Physical Exam   Constitutional: She is oriented to person, place, and time. She appears well-developed and well-nourished. No distress. HENT:   Head: Normocephalic and atraumatic. Cardiovascular: Normal rate, regular rhythm and normal heart sounds. Pulmonary/Chest: Effort normal and breath sounds normal. No respiratory distress. She has no wheezes. Abdominal: Bowel sounds are normal. She exhibits distension. She exhibits no shifting dullness, no fluid wave, no ascites, no pulsatile midline mass and no mass. There is no hepatosplenomegaly. There is generalized tenderness. There is no rigidity, no rebound, no guarding, no CVA tenderness, no tenderness at McBurney's point and negative Villar's sign. No hernia. Moderate diffuse abdominal tenderness, especially in epigastric and lower abdominal areas bilaterally   Neurological: She is alert and oriented to person, place, and time.    Skin: Skin is warm and dry. She is not diaphoretic. Psychiatric: She has a normal mood and affect. Her behavior is normal. Judgment normal.   Nursing note and vitals reviewed. ASSESSMENT and PLAN    ICD-10-CM ICD-9-CM    1. Generalized abdominal pain R10.84 789.07 AMB POC URINALYSIS DIP STICK AUTO W/ MICRO       CBC WITH AUTOMATED DIFF      METABOLIC PANEL, COMPREHENSIVE      LIPASE      AMYLASE      H PYLORI AB, IGM      CT ABD W CONT AND PELVIS W WO CONT      dicyclomine (BENTYL) 10 mg capsule      nitrofurantoin, macrocrystal-monohydrate, (MACROBID) 100 mg capsule   2. Abnormal urinalysis R82.90 791.9 CULTURE, URINE      nitrofurantoin, macrocrystal-monohydrate, (MACROBID) 100 mg capsule     1. Generalized abdominal pain - UA inconclusive, with high pH and some WBCs. With hx of pyelo, will treat with macrobid and send for culture. Will also check CBC, CMP, lipase, amylase, H. Pylori, and send for CT scan abd/pelvis. Start bentyl as needed for pain. Discussed rapidly changing nature of abdominal pain and encouraged her to seek further care if symptoms worsen or fail to improve, but will try to manage this on the outpatient side. Follow up if symptoms worsen or fail to improve, and in July as scheduled for routine care. Follow-up and Dispositions    · Return if symptoms worsen or fail to improve.

## 2019-06-02 LAB — BACTERIA UR CULT: NO GROWTH

## 2019-06-03 LAB
ALBUMIN SERPL-MCNC: 4.7 G/DL (ref 3.5–5.5)
ALBUMIN/GLOB SERPL: 2.4 {RATIO} (ref 1.2–2.2)
ALP SERPL-CCNC: 44 IU/L (ref 39–117)
ALT SERPL-CCNC: 10 IU/L (ref 0–32)
AMYLASE SERPL-CCNC: 47 U/L (ref 31–124)
AST SERPL-CCNC: 14 IU/L (ref 0–40)
BASOPHILS # BLD AUTO: 0 X10E3/UL (ref 0–0.2)
BASOPHILS NFR BLD AUTO: 0 %
BILIRUB SERPL-MCNC: 0.6 MG/DL (ref 0–1.2)
BUN SERPL-MCNC: 10 MG/DL (ref 6–20)
BUN/CREAT SERPL: 13 (ref 9–23)
CALCIUM SERPL-MCNC: 9.5 MG/DL (ref 8.7–10.2)
CHLORIDE SERPL-SCNC: 100 MMOL/L (ref 96–106)
CO2 SERPL-SCNC: 24 MMOL/L (ref 20–29)
CREAT SERPL-MCNC: 0.78 MG/DL (ref 0.57–1)
EOSINOPHIL # BLD AUTO: 0.1 X10E3/UL (ref 0–0.4)
EOSINOPHIL NFR BLD AUTO: 1 %
ERYTHROCYTE [DISTWIDTH] IN BLOOD BY AUTOMATED COUNT: 14.5 % (ref 12.3–15.4)
GLOBULIN SER CALC-MCNC: 2 G/DL (ref 1.5–4.5)
GLUCOSE SERPL-MCNC: 89 MG/DL (ref 65–99)
H PYLORI IGM SER-ACNC: <9 UNITS (ref 0–8.9)
HCT VFR BLD AUTO: 38.3 % (ref 34–46.6)
HGB BLD-MCNC: 12.7 G/DL (ref 11.1–15.9)
IMM GRANULOCYTES # BLD AUTO: 0 X10E3/UL (ref 0–0.1)
IMM GRANULOCYTES NFR BLD AUTO: 0 %
LIPASE SERPL-CCNC: 25 U/L (ref 14–72)
LYMPHOCYTES # BLD AUTO: 1.2 X10E3/UL (ref 0.7–3.1)
LYMPHOCYTES NFR BLD AUTO: 15 %
MCH RBC QN AUTO: 31.5 PG (ref 26.6–33)
MCHC RBC AUTO-ENTMCNC: 33.2 G/DL (ref 31.5–35.7)
MCV RBC AUTO: 95 FL (ref 79–97)
MONOCYTES # BLD AUTO: 0.3 X10E3/UL (ref 0.1–0.9)
MONOCYTES NFR BLD AUTO: 4 %
NEUTROPHILS # BLD AUTO: 6.5 X10E3/UL (ref 1.4–7)
NEUTROPHILS NFR BLD AUTO: 80 %
PLATELET # BLD AUTO: 131 X10E3/UL (ref 150–450)
POTASSIUM SERPL-SCNC: 3.9 MMOL/L (ref 3.5–5.2)
PROT SERPL-MCNC: 6.7 G/DL (ref 6–8.5)
RBC # BLD AUTO: 4.03 X10E6/UL (ref 3.77–5.28)
SODIUM SERPL-SCNC: 141 MMOL/L (ref 134–144)
WBC # BLD AUTO: 8.1 X10E3/UL (ref 3.4–10.8)

## 2019-06-12 ENCOUNTER — OFFICE VISIT (OUTPATIENT)
Dept: RHEUMATOLOGY | Age: 34
End: 2019-06-12

## 2019-06-12 VITALS
BODY MASS INDEX: 24.64 KG/M2 | RESPIRATION RATE: 18 BRPM | SYSTOLIC BLOOD PRESSURE: 111 MMHG | WEIGHT: 157 LBS | HEIGHT: 67 IN | TEMPERATURE: 98.7 F | OXYGEN SATURATION: 96 % | HEART RATE: 71 BPM | DIASTOLIC BLOOD PRESSURE: 76 MMHG

## 2019-06-12 DIAGNOSIS — D69.6 THROMBOCYTOPENIA (HCC): Primary | ICD-10-CM

## 2019-06-12 DIAGNOSIS — F33.1 MODERATE EPISODE OF RECURRENT MAJOR DEPRESSIVE DISORDER (HCC): ICD-10-CM

## 2019-06-12 DIAGNOSIS — M34.9 SCLERODERMA (HCC): ICD-10-CM

## 2019-06-12 DIAGNOSIS — L94.0 MORPHEA: ICD-10-CM

## 2019-06-12 DIAGNOSIS — Z79.60 LONG-TERM USE OF IMMUNOSUPPRESSANT MEDICATION: ICD-10-CM

## 2019-06-12 RX ORDER — PROMETHAZINE HYDROCHLORIDE 25 MG/1
25 TABLET ORAL
Qty: 30 TAB | Refills: 1 | Status: SHIPPED | OUTPATIENT
Start: 2019-06-12 | End: 2019-07-05 | Stop reason: SDUPTHER

## 2019-06-12 RX ORDER — TRAZODONE HYDROCHLORIDE 100 MG/1
100 TABLET ORAL
Qty: 30 TAB | Refills: 0 | Status: SHIPPED | OUTPATIENT
Start: 2019-06-12 | End: 2019-07-05 | Stop reason: SDUPTHER

## 2019-06-12 NOTE — PROGRESS NOTES
REASON FOR VISIT    Patient presents for a follow up visit. HISTORY OF DISEASE: Localized scleroderma (Morphea)    Year of diagnosis:   First visit with me: 2019  Cumulative disease manifestations: Morphea patches  Positive serologies/labs:  Negative serologies/labs: Other co-morbidities: depression, thrombocytopenia  Relapses:      Past treatment history: none  Prednisone:  Prednisone (3/2019-2019)  Non-biologic DMARD: Cellcept 3 gms daily (3/12/2018-present)  Other:     HISTORY OF PRESENT ILLNESS     This is a 34 y.o. female with hx of depression, thrombocytopenia. MHAQ:0.125  Pain scale: 3/10  The patient notes she is feeling very tired. She stopped the prednisone 1-2 weeks ago. She is still on 3 gms of cellcept. It is still upsetting her stomach. This happens occasionally and she gets nausea. At those times, she prefers to take an anti-emetic. Phenergan is what helps her. She notes the skin lesions are still there. She thinks they are getting lighter. Skin rashes are resolved as well. They treated her for shingles for that. REVIEW OF SYSTEMS    A comprehensive review of systems was performed and pertinent results are documented in the HPI, review of systems is otherwise non-contributory. PAST MEDICAL HISTORY    Past Medical History:   Diagnosis Date    Abnormal Papanicolaou smear of cervix     Acute pyelonephritis 3/22/2018    Anemia     Anxiety     Coagulation disorder ([address]     thrombocytopenia with pregnancy's    Depression     Disease of blood and blood forming organ     thrombocytopenia    Fetal heart disease 2017    Localized scleroderma         Past Surgical History:   Procedure Laterality Date    BREAST SURGERY PROCEDURE UNLISTED  12/15/2018    DR. LORENZO SANABRIA     DELIVERY ONLY      HX  SECTION  2017    HX GYN  10/2015        HX HEENT      T&A       FAMILY HISTORY    Family History   Problem Relation Age of Onset    No Known Problems Mother     No Known Problems Father        SOCIAL HISTORY    Social History     Tobacco Use    Smoking status: Never Smoker    Smokeless tobacco: Never Used   Substance Use Topics    Alcohol use: Yes     Comment: weekends    Drug use: No     NO pregnancy losses  MEDICATIONS    Current Outpatient Medications   Medication Sig Dispense Refill    traZODone (DESYREL) 100 mg tablet Take 1 Tab by mouth nightly. 30 Tab 0    promethazine (PHENERGAN) 25 mg tablet Take 1 Tab by mouth every six (6) hours as needed for Nausea. 30 Tab 1    buPROPion XL (WELLBUTRIN XL) 300 mg XL tablet Take 1 Tab by mouth every morning. 30 Tab 2    busPIRone (BUSPAR) 10 mg tablet Take 1 Tab by mouth two (2) times a day. 60 Tab 3    OTHER Patient reported takes Calcium unsure of dosage      cholecalciferol (VITAMIN D3) 1,000 unit tablet Take  by mouth daily.  ondansetron (ZOFRAN ODT) 4 mg disintegrating tablet Take 1 Tab by mouth every eight (8) hours as needed for Nausea. 15 Tab 1    mycophenolate (CELLCEPT) 500 mg tablet Start 1 tab twice daily for 7 days then 3 tabs daily for 7 days then 2 tabs twice daily for 7 days then 3 tabs twice daily (Patient taking differently: 1,500 mg. Start 1 tab twice daily for 7 days then 3 tabs daily for 7 days then 2 tabs twice daily for 7 days then 3 tabs twice daily) 180 Tab 3    ascorbic acid, vitamin C, (VITAMIN C) 250 mg tablet Take  by mouth daily. ALLERGIES    Allergies   Allergen Reactions    Morphine Hives       PHYSICAL EXAMINATION    Visit Vitals  /76 (BP 1 Location: Right arm, BP Patient Position: Sitting)   Pulse 71   Temp 98.7 °F (37.1 °C) (Oral)   Resp 18   Ht 5' 7\" (1.702 m)   Wt 157 lb (71.2 kg)   SpO2 96%   BMI 24.59 kg/m²     Body mass index is 24.59 kg/m². General: NAD  HEENT: PERRL, anicteric, non-injected sclerae; oropharynx without ulcers, erythema, or exudate. Moist mucous membranes.   Lymphatic: No cervical or axillary lymphadenopathy. Cardiovascular: S1, S2,no R/M/G  Pulmonary: CTA b/l. No wheezes/rales/rhonchi. Abdominal: Soft,NTND, + BS. Skin:   Upper back: 12x 7 cm  Right lateral le cm  Back of right thigh:  2-3 cm   Small nodule on abdomen unchanged    Neuro: Alert; able to carry normal conversation    Musculoskeletal:   Cervical & Lumbar Spine  Neck and spine have no noted deformities or signs of inflammation. Curvature of cervical, thoracic, and lumbar spine are within normal limits. Wrist, Hand, & Fingers  No bony deformities, inflammation, or tenderness of bony prominences. No anatomical snuff box tenderness; Full ROM in DIP, PIP, MCP, & carpal joints & with supination and pronation. Elbow  No bony deformities, inflammation, or tenderness in olecranon, medial, lateral epicondyle elbow. Full ROM upon flexion and extension. Shoulder  No bony deformities, inflammation, or tenderness in rotator cuff, biceps tendon, or acromioclavicular joint. Full ROM and strength in shoulder upon adduction, abduction, internal and external rotation. Hip  No bony deformities, inflammation, or tenderness in hip joint. Knee  FROM of the knee. NO swelling or warmth noted.  No bony deformity noted    Ankle/toes  No bony deformities, inflammation or tenderness    Physical Exam   Skin:        Lesions appear smaller today      DATA REVIEW    Prior medical records were reviewed and if applicable are summarized as below:    Labs:   2019: cbc with mild thrombocytopenia, CMP unremarkable  2019: cbc and CMP unremarkable  2019: Quant gold, Hep B, C negative, BNP, UPCR, CK normal, smith/RNP, RF, SSa/B, SCL 70, RNA Polymerase III, myositis panel, DMITRIY-1, anti-centromere, DAYTON negative, Cr 1.08; LFTs wnl, Plt 120  10/2018: CRP, ESR, cmp normal, Plt 114    Imaging:   CXR (2019): negative  CT chest high res (2019): normal, b/l nephrolithiasis  TTE (2019): EF 60-65%    Pathology:  Right breast mass and left upper back skin biopsy (12/2018): localized scleroderma (morphea). ASSESSMENT AND PLAN    A 34 y.o. female with hx of thrombocytopenia, localized scleroderma on cellcept 3 gms daily  presents for a follow up visit. The lesions have stopped growing and are smaller in size today. They are also more faint. No new lesions. # Localized scleroderma (Morphea):   - continue cellcept 3 gms daily for now. The patient has occasional nausea on CellCept still. Given that her symptoms are occasional, she would like to continue this medication. Will prescribe some Phenergan in case of nausea. # Med toxicity:  - on calcium and Vitamin D   - Given thatpatitis panel and quant gold negative 1/2019  - s/p tubal ligation per the patient  - cbc, cmp unremarkable in 5/2019    The patient voiced understanding of the aforementioned assessment and plan. Summary of plan was provided in the After Visit Summary patient instructions. I also provided education about MyChart setup and utility. Ms. Bishnu Rene has a reminder for a \"due or due soon\" health maintenance. I have asked that she contact her primary care provider for follow-up on this health maintenance.     TODAY'S ORDERS    Orders Placed This Encounter    traZODone (DESYREL) 100 mg tablet    promethazine (PHENERGAN) 25 mg tablet       Future Appointments   Date Time Provider Arlene Gamble   7/5/2019 11:30 AM Fransico Mccann NP [address]   9/10/2019 10:20 AM Gabriela Hirsch  Mami Morris MD    Adult Rheumatology   [facility], [address] [place] [address]   Phone [phone]  Fax [phone]

## 2019-06-12 NOTE — PROGRESS NOTES
Chief Complaint   Patient presents with    Other     scleraderma     1. Have you been to the ER, urgent care clinic since your last visit? Hospitalized since your last visit? No    2. Have you seen or consulted any other health care providers outside of the [address] [place] [address] since your last visit? Include any pap smears or colon screening.  No

## 2019-07-05 ENCOUNTER — OFFICE VISIT (OUTPATIENT)
Dept: FAMILY MEDICINE CLINIC | Age: 34
End: 2019-07-05

## 2019-07-05 VITALS
BODY MASS INDEX: 24.74 KG/M2 | HEART RATE: 99 BPM | TEMPERATURE: 97.6 F | SYSTOLIC BLOOD PRESSURE: 128 MMHG | OXYGEN SATURATION: 100 % | WEIGHT: 157.6 LBS | HEIGHT: 67 IN | DIASTOLIC BLOOD PRESSURE: 82 MMHG | RESPIRATION RATE: 16 BRPM

## 2019-07-05 DIAGNOSIS — N30.00 ACUTE CYSTITIS WITHOUT HEMATURIA: ICD-10-CM

## 2019-07-05 DIAGNOSIS — F41.9 ANXIETY: ICD-10-CM

## 2019-07-05 DIAGNOSIS — D69.6 THROMBOCYTOPENIA (HCC): ICD-10-CM

## 2019-07-05 DIAGNOSIS — Z00.00 ENCOUNTER FOR ANNUAL PHYSICAL EXAMINATION EXCLUDING GYNECOLOGICAL EXAMINATION IN A PATIENT OLDER THAN 17 YEARS: Primary | ICD-10-CM

## 2019-07-05 DIAGNOSIS — M34.9 SCLERODERMA (HCC): ICD-10-CM

## 2019-07-05 DIAGNOSIS — E55.9 VITAMIN D DEFICIENCY: ICD-10-CM

## 2019-07-05 DIAGNOSIS — F33.1 MODERATE EPISODE OF RECURRENT MAJOR DEPRESSIVE DISORDER (HCC): ICD-10-CM

## 2019-07-05 RX ORDER — PROMETHAZINE HYDROCHLORIDE 25 MG/1
25 TABLET ORAL
Qty: 30 TAB | Refills: 1 | Status: CANCELLED | OUTPATIENT
Start: 2019-07-05

## 2019-07-05 RX ORDER — MELATONIN
1000 DAILY
Qty: 90 TAB | Refills: 2 | Status: SHIPPED | OUTPATIENT
Start: 2019-07-05 | End: 2020-07-28

## 2019-07-05 RX ORDER — ONDANSETRON 4 MG/1
4 TABLET, ORALLY DISINTEGRATING ORAL
Qty: 15 TAB | Refills: 1 | Status: SHIPPED | OUTPATIENT
Start: 2019-07-05 | End: 2019-10-26 | Stop reason: SDUPTHER

## 2019-07-05 RX ORDER — BUSPIRONE HYDROCHLORIDE 10 MG/1
10 TABLET ORAL 2 TIMES DAILY
Qty: 180 TAB | Refills: 2 | Status: SHIPPED | OUTPATIENT
Start: 2019-07-05 | End: 2019-08-20 | Stop reason: DRUGHIGH

## 2019-07-05 RX ORDER — TRAZODONE HYDROCHLORIDE 100 MG/1
100 TABLET ORAL
Qty: 90 TAB | Refills: 2 | Status: SHIPPED | OUTPATIENT
Start: 2019-07-05 | End: 2020-01-10 | Stop reason: SDUPTHER

## 2019-07-05 RX ORDER — PROMETHAZINE HYDROCHLORIDE 25 MG/1
25 TABLET ORAL
Qty: 30 TAB | Refills: 1 | Status: SHIPPED | OUTPATIENT
Start: 2019-07-05 | End: 2019-10-26 | Stop reason: SDUPTHER

## 2019-07-05 RX ORDER — BUPROPION HYDROCHLORIDE 300 MG/1
300 TABLET ORAL
Qty: 90 TAB | Refills: 2 | Status: SHIPPED | OUTPATIENT
Start: 2019-07-05 | End: 2020-01-10 | Stop reason: SDUPTHER

## 2019-07-05 RX ORDER — ONDANSETRON 4 MG/1
4 TABLET, ORALLY DISINTEGRATING ORAL
Qty: 15 TAB | Refills: 1 | Status: CANCELLED | OUTPATIENT
Start: 2019-07-05

## 2019-07-05 NOTE — PROGRESS NOTES
HISTORY OF PRESENT ILLNESS  Clare Talamantes is a 34 y.o. female. HPI  Here today for CPE. Overall, doing well. Recently got back from [place], where she had a severe bout of gastroenteritis and required ED visits x 2 for fluid resuscitation. Feeling much better now. Depression/Anxiety  Currently taking wellbutrin 300mg XL daily, trazodone 100mg QHS, and buspar 10mg BID; buspar and wellbutrin were increased in May 2019. Was previously on effexor, but it caused low libido so she stopped it. Reports that her depression and anxiety are both under good control since uptitrating the dosages of her medications. Repeat PHQ-9 score of 4 today. Scleroderma  Sees Dr. Fercho Faustin at [address] [place] [address]. Currently taking cellcept. Plan is to continue therapy for 2 - 3 years. Last visit June 2019, f/u 3 months. Thrombocytopenia - Hx of this, got worse during pregnancy. Saw heme[facility]onc at [facility], but never got any follow-up set up there and isn't sure what the plan was. Saw Dr. Radha Waldrop, and plan is to monitor at this point. Most recent PLT count was 142  in April 2019 with Dr. Fercho Faustin.      Hx HSV  Has been told that her bloodwork was positive for herpes, but has never had an outbreak.      Current Specialists:  1. Dr. Fercho Faustin, [facility] rheum - last visit June 2019     Preventative Care  TDaP: May 2017  Pap smear: October 2018; NILM with neg HPV  Mammogram: has never had  Colonoscopy: has never had  Denies early family hx of breast or colon CA.      Healthy Habits  Patient is exercising 3 - 4x per week, running, and active with her kids. Patient endorses healthy diet; avoids fatty/greasy foods, sugar-sweetened beverages. Denies tobacco use. Social alcohol use. Denies illicit drug use. Sexually active with 1 male partner in last 12 months.  has a vasectomy; denies concerns for STIs today and declines screening. LMP 6/20/19.     Past Medical History:   Diagnosis Date    Abnormal Papanicolaou smear of cervix  Acute pyelonephritis 3/22/2018    Anemia     Anxiety     Coagulation disorder (HCC)     thrombocytopenia with pregnancy's    Depression     Disease of blood and blood forming organ     thrombocytopenia    Fetal heart disease 2017    Localized scleroderma      Past Surgical History:   Procedure Laterality Date    BREAST SURGERY PROCEDURE UNLISTED  12/15/2018    DR. LORENZO SANABRIA     DELIVERY ONLY      HX  SECTION  2017    HX GYN  10/2015        HX HEENT      T&A     Family History   Problem Relation Age of Onset    No Known Problems Mother     No Known Problems Father      Social History     Socioeconomic History    Marital status:      Spouse name: Salvador Gray Number of children: Not on file    Years of education: Not on file    Highest education level: Not on file   Tobacco Use    Smoking status: Never Smoker    Smokeless tobacco: Never Used   Substance and Sexual Activity    Alcohol use: Yes     Comment: weekends    Drug use: No    Sexual activity: Yes     Partners: Male     Birth control/protection: Surgical     Patient Active Problem List   Diagnosis Code    Thrombocytopenia ([email or web address] D69.6    Previous  section complicating pregnancy L77.818    Dysmenorrhea N94.6    Moderate episode of recurrent major depressive disorder ([facility] F33.1    Scleroderma ([facility] M34.9    Vitamin D deficiency E55.9    Anxiety F41.9     Outpatient Encounter Medications as of 2019   Medication Sig Dispense Refill    buPROPion XL (WELLBUTRIN XL) 300 mg XL tablet Take 1 Tab by mouth every morning. 90 Tab 2    cholecalciferol (VITAMIN D3) 1,000 unit tablet Take 1 Tab by mouth daily. 90 Tab 2    traZODone (DESYREL) 100 mg tablet Take 1 Tab by mouth nightly. 90 Tab 2    promethazine (PHENERGAN) 25 mg tablet Take 1 Tab by mouth every six (6) hours as needed for Nausea. 30 Tab 1    busPIRone (BUSPAR) 10 mg tablet Take 1 Tab by mouth two (2) times a day.  180 Tab 2    ondansetron (ZOFRAN ODT) 4 mg disintegrating tablet Take 1 Tab by mouth every eight (8) hours as needed for Nausea. 15 Tab 1    OTHER Patient reported takes Calcium unsure of dosage      ascorbic acid, vitamin C, (VITAMIN C) 250 mg tablet Take  by mouth daily.  mycophenolate (CELLCEPT) 500 mg tablet Start 1 tab twice daily for 7 days then 3 tabs daily for 7 days then 2 tabs twice daily for 7 days then 3 tabs twice daily (Patient taking differently: 1,500 mg. Start 1 tab twice daily for 7 days then 3 tabs daily for 7 days then 2 tabs twice daily for 7 days then 3 tabs twice daily) 180 Tab 3    [DISCONTINUED] traZODone (DESYREL) 100 mg tablet Take 1 Tab by mouth nightly. 30 Tab 0    [DISCONTINUED] promethazine (PHENERGAN) 25 mg tablet Take 1 Tab by mouth every six (6) hours as needed for Nausea. 30 Tab 1    [DISCONTINUED] buPROPion XL (WELLBUTRIN XL) 300 mg XL tablet Take 1 Tab by mouth every morning. 30 Tab 2    [DISCONTINUED] busPIRone (BUSPAR) 10 mg tablet Take 1 Tab by mouth two (2) times a day. 60 Tab 3    [DISCONTINUED] cholecalciferol (VITAMIN D3) 1,000 unit tablet Take  by mouth daily.  [DISCONTINUED] ondansetron (ZOFRAN ODT) 4 mg disintegrating tablet Take 1 Tab by mouth every eight (8) hours as needed for Nausea. 15 Tab 1     No facility-administered encounter medications on file as of 7/5/2019. Visit Vitals  /82 (BP 1 Location: Left arm, BP Patient Position: Sitting)   Pulse 99   Temp 97.6 °F (36.4 °C) (Oral)   Resp 16   Ht 5' 7\" (1.702 m)   Wt 157 lb 9.6 oz (71.5 kg)   LMP 06/15/2019 (Exact Date)   SpO2 100%   Breastfeeding?  No   BMI 24.68 kg/m²       ROS  Constitutional: denies fevers, chills, fatigue, unintentional weight loss  Skin: denies rashes, itching, concerning/changing lesions  HENT: denies ear pain, hearing loss, sore throat, congestion  Eye: denies eye pain, blurred/double vision, eye discharge/redness  Cardiovascular: denies chest pain, palpitations, peripheral edema, orthopnea, claudication  Pulmonary: denies cough, shortness of breath, wheezing  Breast: denies breast lumps, pain; endorses occasional BSE  GI: denies heartburn, nausea, vomiting, diarrhea, constipation, rectal bleeding, abdominal pain  : denies dysuria, hematuria, vaginal discharge  MS: denies frequent/unexplained falls, persistent myalgias  Neuro: denies headaches, lightheadedness, numbness/tingling, seizures, sensory/strength changes  Psychiatry: denies depression, anxiety, insomnia, memory loss    Physical Exam  Vital Signs:   Visit Vitals  /82 (BP 1 Location: Left arm, BP Patient Position: Sitting)   Pulse 99   Temp 97.6 °F (36.4 °C) (Oral)   Resp 16   Ht 5' 7\" (1.702 m)   Wt 157 lb 9.6 oz (71.5 kg)   LMP 06/15/2019 (Exact Date)   SpO2 100%   Breastfeeding? No   BMI 24.68 kg/m²     Constitutional: alert, oriented, no acute distress  HENT: normocephalic, atraumatic; ears normal bilaterally; nose normal; oropharynx clear and moist  Eyes: PERRL, EOM intact, conjunctivae normal, no discharge  Neck: no thyromegaly, no cervical, submandibular, submental, occipital, supraclavicular lymphadenopathy  Cardiovascular: normal rate, regular rhythm, no murmurs noted; radial and pedal pulses normal bilaterally  Pulmonary: lungs clear to auscultation bilaterally with no wheezing, rhonchi, or increased work of breathing  Breast: normal breast exam bilaterally with no lumps or tenderness; no axillary lymphadenopathy  Abdomen: soft, non-tender; + bowel sounds; no distension, masses, or organomegaly noted  Pelvic: DECLINED  MSK: normal gait, full ROM of all extremities  Neurological: alert, oriented, CN II - XII grossly intact; DTRs 2+ bilaterally  Skin: warm, dry, no rashes or suspicious lesions noted;  + subcutaneous masses  Psych: mood/affect normal, behavior normal      ASSESSMENT and PLAN    ICD-10-CM ICD-9-CM    1.  Encounter for annual physical examination excluding gynecological examination in a patient older than 17 years Z00.00 V70.0 CBC W/O DIFF      METABOLIC PANEL, COMPREHENSIVE      LIPID PANEL AND CHOL/HDL RATIO      URINALYSIS W/ RFLX MICROSCOPIC      TSH RFX ON ABNORMAL TO FREE T4   2. Moderate episode of recurrent major depressive disorder (HCC) F33.1 296.32 buPROPion XL (WELLBUTRIN XL) 300 mg XL tablet      traZODone (DESYREL) 100 mg tablet      busPIRone (BUSPAR) 10 mg tablet   3. Thrombocytopenia (HCC) D69.6 287.5    4. Scleroderma (HCC) M34.9 710.1 promethazine (PHENERGAN) 25 mg tablet   5. Vitamin D deficiency E55.9 268.9 VITAMIN D, 25 HYDROXY      cholecalciferol (VITAMIN D3) 1,000 unit tablet   6. Anxiety F41.9 300.00 buPROPion XL (WELLBUTRIN XL) 300 mg XL tablet      busPIRone (BUSPAR) 10 mg tablet   7. Acute cystitis without hematuria N30.00 595.0 ondansetron (ZOFRAN ODT) 4 mg disintegrating tablet     Normal CPE today; pelvic exam declined. Check routine labs as ordered above. Depression well-controlled on current medications; continue. One-time refill provided today on phenergan and zofran, but I have asked the patient to request these from her rheumatologist in the future. Follow up with Dr. Viri Mazariegos as scheduled for scleroderma care. Preventative care needs are up to date. Return in 6 - 7 months for medication follow up, sooner PRN or TBD by lab results. Follow-up and Dispositions    · Return in about 7 months (around 2/5/2020) for medication f/u, sooner PRN.

## 2019-07-05 NOTE — PROGRESS NOTES
Chief Complaint   Patient presents with    Complete Physical     Pt dx with Christian virus while in [place] 2 weeks ago. 1. Have you been to the ER, urgent care clinic since your last visit? Hospitalized since your last visit? Yes    2. Have you seen or consulted any other health care providers outside of the [address] [place] [address] since your last visit? Include any pap smears or colon screening. No    There are no preventive care reminders to display for this patient.

## 2019-07-06 LAB
25(OH)D3+25(OH)D2 SERPL-MCNC: 50.5 NG/ML (ref 30–100)
ALBUMIN SERPL-MCNC: 4.4 G/DL (ref 3.5–5.5)
ALBUMIN/GLOB SERPL: 1.8 {RATIO} (ref 1.2–2.2)
ALP SERPL-CCNC: 52 IU/L (ref 39–117)
ALT SERPL-CCNC: 27 IU/L (ref 0–32)
APPEARANCE UR: CLEAR
AST SERPL-CCNC: 29 IU/L (ref 0–40)
BILIRUB SERPL-MCNC: 0.3 MG/DL (ref 0–1.2)
BILIRUB UR QL STRIP: NEGATIVE
BUN SERPL-MCNC: 11 MG/DL (ref 6–20)
BUN/CREAT SERPL: 14 (ref 9–23)
CALCIUM SERPL-MCNC: 9.3 MG/DL (ref 8.7–10.2)
CHLORIDE SERPL-SCNC: 104 MMOL/L (ref 96–106)
CHOLEST SERPL-MCNC: 131 MG/DL (ref 100–199)
CHOLEST/HDLC SERPL: 2.7 RATIO (ref 0–4.4)
CO2 SERPL-SCNC: 22 MMOL/L (ref 20–29)
COLOR UR: YELLOW
CREAT SERPL-MCNC: 0.79 MG/DL (ref 0.57–1)
ERYTHROCYTE [DISTWIDTH] IN BLOOD BY AUTOMATED COUNT: 12.8 % (ref 12.3–15.4)
GLOBULIN SER CALC-MCNC: 2.4 G/DL (ref 1.5–4.5)
GLUCOSE SERPL-MCNC: 75 MG/DL (ref 65–99)
GLUCOSE UR QL: NEGATIVE
HCT VFR BLD AUTO: 42 % (ref 34–46.6)
HDLC SERPL-MCNC: 48 MG/DL
HGB BLD-MCNC: 13.9 G/DL (ref 11.1–15.9)
HGB UR QL STRIP: NEGATIVE
INTERPRETATION, 910389: NORMAL
KETONES UR QL STRIP: NEGATIVE
LDLC SERPL CALC-MCNC: 70 MG/DL (ref 0–99)
LEUKOCYTE ESTERASE UR QL STRIP: NEGATIVE
MCH RBC QN AUTO: 31 PG (ref 26.6–33)
MCHC RBC AUTO-ENTMCNC: 33.1 G/DL (ref 31.5–35.7)
MCV RBC AUTO: 94 FL (ref 79–97)
MICRO URNS: NORMAL
NITRITE UR QL STRIP: NEGATIVE
PH UR STRIP: 7 [PH] (ref 5–7.5)
PLATELET # BLD AUTO: 213 X10E3/UL (ref 150–450)
POTASSIUM SERPL-SCNC: 4.6 MMOL/L (ref 3.5–5.2)
PROT SERPL-MCNC: 6.8 G/DL (ref 6–8.5)
PROT UR QL STRIP: NEGATIVE
RBC # BLD AUTO: 4.49 X10E6/UL (ref 3.77–5.28)
SODIUM SERPL-SCNC: 141 MMOL/L (ref 134–144)
SP GR UR: 1.02 (ref 1–1.03)
TRIGL SERPL-MCNC: 67 MG/DL (ref 0–149)
TSH SERPL DL<=0.005 MIU/L-ACNC: 1.67 UIU/ML (ref 0.45–4.5)
UROBILINOGEN UR STRIP-MCNC: 0.2 MG/DL (ref 0.2–1)
VLDLC SERPL CALC-MCNC: 13 MG/DL (ref 5–40)
WBC # BLD AUTO: 9.4 X10E3/UL (ref 3.4–10.8)

## 2019-07-25 DIAGNOSIS — M34.9 SCLERODERMA (HCC): ICD-10-CM

## 2019-07-25 DIAGNOSIS — L94.0 MORPHEA: Primary | ICD-10-CM

## 2019-08-02 DIAGNOSIS — J01.90 ACUTE SINUSITIS, RECURRENCE NOT SPECIFIED, UNSPECIFIED LOCATION: Primary | ICD-10-CM

## 2019-08-02 RX ORDER — VALACYCLOVIR HYDROCHLORIDE 1 G/1
1000 TABLET, FILM COATED ORAL DAILY
Qty: 30 TAB | Refills: 1 | Status: SHIPPED | OUTPATIENT
Start: 2019-08-02 | End: 2019-08-20 | Stop reason: ALTCHOICE

## 2019-08-02 RX ORDER — AMOXICILLIN AND CLAVULANATE POTASSIUM 875; 125 MG/1; MG/1
1 TABLET, FILM COATED ORAL 2 TIMES DAILY
Qty: 20 TAB | Refills: 0 | Status: SHIPPED | OUTPATIENT
Start: 2019-08-02 | End: 2019-08-12

## 2019-08-06 ENCOUNTER — TELEPHONE (OUTPATIENT)
Dept: RHEUMATOLOGY | Age: 34
End: 2019-08-06

## 2019-08-06 NOTE — TELEPHONE ENCOUNTER
----- Message from Maddy Johnson RN sent at 8/5/2019  4:57 PM EDT -----  Regarding: FW: Dr Cara Chavis      ----- Message -----  From: Consuelo Porter  Sent: 8/5/2019   4:47 PM EDT  To: [facility] Nurse Pool  Subject: Dr Mars Betancur first and last name: [facility]      Reason for call: requesting clarification on directions on Rx for \"Cellcept\"      Callback required yes/no and why:      Best contact number(s): [phone]      Details to clarify the request: Ref # [id number]      Carmen Malave

## 2019-08-19 ENCOUNTER — TELEPHONE (OUTPATIENT)
Dept: FAMILY MEDICINE CLINIC | Age: 34
End: 2019-08-19

## 2019-08-19 NOTE — TELEPHONE ENCOUNTER
----- Message from Mehul Braga sent at 8/19/2019  3:19 PM EDT -----  Regarding: INDU [facility]  Contact: [phone]  Appointment Request From: Carmine Powers    With Provider: Belkis Sinhg NP [[facility]]    Preferred Date Range: 8/20/2019 - 8/20/2019    Preferred Times: Any time    Reason for visit: Request an Appointment    Comments:  Medication management

## 2019-08-19 NOTE — TELEPHONE ENCOUNTER
Verified patient with two types of identifiers. States that when she finished the antibiotics her sore throat came back. She c/o neck and ear pain along with her voice disappearing. She is also having a lot of anxiety and is having a hard time dealing with every day activities due to the anxiety. appt made for tomorrow at 9:30am.  Patient verbalizes understanding. And will call with any questions or concerns.

## 2019-08-20 ENCOUNTER — OFFICE VISIT (OUTPATIENT)
Dept: FAMILY MEDICINE CLINIC | Age: 34
End: 2019-08-20

## 2019-08-20 VITALS
HEIGHT: 67 IN | SYSTOLIC BLOOD PRESSURE: 132 MMHG | WEIGHT: 158.2 LBS | BODY MASS INDEX: 24.83 KG/M2 | OXYGEN SATURATION: 99 % | DIASTOLIC BLOOD PRESSURE: 84 MMHG | RESPIRATION RATE: 16 BRPM | TEMPERATURE: 98 F | HEART RATE: 90 BPM

## 2019-08-20 DIAGNOSIS — F41.9 ANXIETY: Primary | ICD-10-CM

## 2019-08-20 DIAGNOSIS — R09.81 NASAL CONGESTION: ICD-10-CM

## 2019-08-20 DIAGNOSIS — R07.0 THROAT PAIN: ICD-10-CM

## 2019-08-20 DIAGNOSIS — F33.1 MODERATE EPISODE OF RECURRENT MAJOR DEPRESSIVE DISORDER (HCC): ICD-10-CM

## 2019-08-20 RX ORDER — ALPRAZOLAM 0.5 MG/1
0.5 TABLET ORAL
Qty: 15 TAB | Refills: 0 | Status: SHIPPED | OUTPATIENT
Start: 2019-08-20 | End: 2019-09-20 | Stop reason: SDUPTHER

## 2019-08-20 RX ORDER — FLUTICASONE PROPIONATE 50 MCG
SPRAY, SUSPENSION (ML) NASAL
Qty: 1 BOTTLE | Refills: 2 | Status: SHIPPED | OUTPATIENT
Start: 2019-08-20 | End: 2020-07-28

## 2019-08-20 RX ORDER — BUSPIRONE HYDROCHLORIDE 10 MG/1
10 TABLET ORAL 3 TIMES DAILY
Qty: 90 TAB | Refills: 5 | Status: SHIPPED | OUTPATIENT
Start: 2019-08-20 | End: 2020-01-10

## 2019-08-20 RX ORDER — SERTRALINE HYDROCHLORIDE 50 MG/1
50 TABLET, FILM COATED ORAL DAILY
Qty: 30 TAB | Refills: 1 | Status: SHIPPED | OUTPATIENT
Start: 2019-08-20 | End: 2019-09-20 | Stop reason: SDUPTHER

## 2019-08-20 NOTE — PROGRESS NOTES
Chief Complaint   Patient presents with    Sinus Pain    Anxiety     Pt states felt sick for 3 weeks. Pt states first day of not taking antibiotics started to feel worse. Today pt is feeling better today but throat is starting to feel sore. Pt has tried OTC sinus medications without relief. 1. Have you been to the ER, urgent care clinic since your last visit? Hospitalized since your last visit? No    2. Have you seen or consulted any other health care providers outside of the [address] [place] [address] since your last visit? Include any pap smears or colon screening. No    There are no preventive care reminders to display for this patient.

## 2019-08-20 NOTE — PROGRESS NOTES
HISTORY OF PRESENT ILLNESS  Luci Amador is a 34 y.o. female. HPI    Pt with PMHx significant for depression/anxiety, scleroderma here today for an acute visit with 2 complaints. She sent a message a few weeks ago with complaints of sore throat, sinus congestion, swollen lymph nodes. I sent an Rx for augmentin to her pharmacy, and she reports taking the entire 10-day course. However, within a few days of stopping the antibiotics, symptoms recurred. She still reports nasal congestion and some hoarseness. Yesterday was shaving sore throat, cough, though those have improved. Has tried OTC tylenol sinus. Not currently using a nasal spray. Feels like she is always sick since starting treatment for scleroderma, which is frustrating to her. She also reports that her anxiety has been out of control lately. She feels overwhelmed, can't shut her mind off. She gets so worked up that she can't come down from it. She is so overwhelmed by her newly-diagnosed health issues and needs a release and isn't sure what to do. She reports that she does okay at work but this is affecting her home life significantly. She doesn't think the buspar is helping her as much as it was previously. Has also been taking her wellbutrin 300mg XL daily and trazodone 100mg QHS. She has never been a good sleeper. Denies SI/HI. FERNANDO score of 17 today. Visit Vitals  /84 (BP 1 Location: Left arm, BP Patient Position: Sitting)   Pulse 90   Temp 98 °F (36.7 °C) (Oral)   Resp 16   Ht 5' 7\" (1.702 m)   Wt 158 lb 3.2 oz (71.8 kg)   LMP 08/08/2019   SpO2 99%   BMI 24.78 kg/m²     Current Outpatient Medications on File Prior to Visit   Medication Sig Dispense Refill    buPROPion XL (WELLBUTRIN XL) 300 mg XL tablet Take 1 Tab by mouth every morning. 90 Tab 2    cholecalciferol (VITAMIN D3) 1,000 unit tablet Take 1 Tab by mouth daily. 90 Tab 2    traZODone (DESYREL) 100 mg tablet Take 1 Tab by mouth nightly.  90 Tab 2    promethazine (PHENERGAN) 25 mg tablet Take 1 Tab by mouth every six (6) hours as needed for Nausea. 30 Tab 1    ondansetron (ZOFRAN ODT) 4 mg disintegrating tablet Take 1 Tab by mouth every eight (8) hours as needed for Nausea. 15 Tab 1    OTHER Patient reported takes Calcium unsure of dosage      ascorbic acid, vitamin C, (VITAMIN C) 250 mg tablet Take  by mouth daily.  mycophenolate (CELLCEPT) 500 mg tablet Start 1 tab twice daily for 7 days then 3 tabs daily for 7 days then 2 tabs twice daily for 7 days then 3 tabs twice daily (Patient taking differently: 1,500 mg. Start 1 tab twice daily for 7 days then 3 tabs daily for 7 days then 2 tabs twice daily for 7 days then 3 tabs twice daily) 180 Tab 3     No current facility-administered medications on file prior to visit. Review of Systems   Constitutional: Negative for chills, fever and malaise/fatigue. HENT: Positive for congestion and sore throat. Negative for ear discharge and ear pain. Eyes: Negative for blurred vision, double vision, pain and discharge. Respiratory: Negative for cough. Cardiovascular: Negative for chest pain and palpitations. Neurological: Negative for weakness and headaches. Psychiatric/Behavioral: Negative for depression, substance abuse and suicidal ideas. The patient is nervous/anxious and has insomnia. Physical Exam   Constitutional: She is oriented to person, place, and time. She appears well-developed and well-nourished. No distress. HENT:   Head: Normocephalic and atraumatic. Right Ear: Tympanic membrane, external ear and ear canal normal.   Left Ear: Tympanic membrane, external ear and ear canal normal.   Nose: Mucosal edema and rhinorrhea present. Right sinus exhibits no maxillary sinus tenderness and no frontal sinus tenderness. Left sinus exhibits no maxillary sinus tenderness and no frontal sinus tenderness.    Mouth/Throat: Uvula is midline, oropharynx is clear and moist and mucous membranes are normal. No oropharyngeal exudate, posterior oropharyngeal edema or posterior oropharyngeal erythema. Neck:   Mild right-sided cervical LAD   Cardiovascular: Normal rate, regular rhythm and normal heart sounds. Pulmonary/Chest: Effort normal and breath sounds normal. No respiratory distress. She has no wheezes. Neurological: She is alert and oriented to person, place, and time. Skin: Skin is warm and dry. She is not diaphoretic. Psychiatric: Her behavior is normal. Judgment normal. Her mood appears anxious. Thought content is not paranoid and not delusional. She expresses no homicidal and no suicidal ideation. Nursing note and vitals reviewed. ASSESSMENT and PLAN    ICD-10-CM ICD-9-CM    1. Anxiety F41.9 300.00 busPIRone (BUSPAR) 10 mg tablet      ALPRAZolam (XANAX) 0.5 mg tablet      sertraline (ZOLOFT) 50 mg tablet      REFERRAL TO PSYCHIATRY   2. Moderate episode of recurrent major depressive disorder (HCC) F33.1 296.32 sertraline (ZOLOFT) 50 mg tablet      REFERRAL TO PSYCHIATRY   3. Throat pain R07.0 784.1 UPPER RESPIRATORY CULTURE   4. Nasal congestion R09.81 478.19 fluticasone propionate (FLONASE) 50 mcg/actuation nasal spray     1. Anxiety/Depression - Worsening control. We discussed various treatment options. For now, will increase buspar to 10mg TID (from BID), add zoloft 50mg daily, and provide alprazolam 0.5mg #15 x 0 for use in extreme situations of anxiety. I explained that I do not chronically write xanax, and so I have referred her to psychiatry for further evaluation, especially knowing that this appointment will likely be a few months away. 2. Throat pain/congestion - No signs of bacterial infection at this time. Will send throat culture and start treatment with flonase nasal spray for nasal congestion. Follow up in approximately 1 month for re-evaluation, sooner PRN. Follow-up and Dispositions    · Return in about 1 month (around 9/20/2019) for follow up.

## 2019-08-22 LAB — BACTERIA SPEC RESP CULT: NORMAL

## 2019-08-22 RX ORDER — ERYTHROMYCIN 5 MG/G
OINTMENT OPHTHALMIC
Qty: 1 TUBE | Refills: 0 | Status: SHIPPED | OUTPATIENT
Start: 2019-08-22 | End: 2019-09-20 | Stop reason: ALTCHOICE

## 2019-09-06 RX ORDER — DOXYCYCLINE 100 MG/1
100 TABLET ORAL 2 TIMES DAILY
Qty: 20 TAB | Refills: 0 | Status: SHIPPED | OUTPATIENT
Start: 2019-09-06 | End: 2019-09-16

## 2019-09-10 ENCOUNTER — OFFICE VISIT (OUTPATIENT)
Dept: RHEUMATOLOGY | Age: 34
End: 2019-09-10

## 2019-09-10 VITALS
SYSTOLIC BLOOD PRESSURE: 115 MMHG | DIASTOLIC BLOOD PRESSURE: 83 MMHG | HEIGHT: 67 IN | TEMPERATURE: 98.6 F | BODY MASS INDEX: 24.48 KG/M2 | RESPIRATION RATE: 16 BRPM | WEIGHT: 156 LBS | OXYGEN SATURATION: 98 % | HEART RATE: 72 BPM

## 2019-09-10 DIAGNOSIS — L94.0 MORPHEA: ICD-10-CM

## 2019-09-10 DIAGNOSIS — D69.6 THROMBOCYTOPENIA (HCC): Primary | ICD-10-CM

## 2019-09-10 DIAGNOSIS — Z79.60 LONG-TERM USE OF IMMUNOSUPPRESSANT MEDICATION: ICD-10-CM

## 2019-09-10 DIAGNOSIS — B99.9 RECURRENT INFECTIONS: ICD-10-CM

## 2019-09-10 NOTE — PATIENT INSTRUCTIONS
Please fill out your [address] you will receive after your visit in the mail or via [address]

## 2019-09-10 NOTE — PROGRESS NOTES
REASON FOR VISIT    Patient presents for a follow up visit. HISTORY OF DISEASE: Localized scleroderma (Morphea)    Year of diagnosis:   First visit with me: 2019  Cumulative disease manifestations: Morphea patches  Positive serologies/labs:  Negative serologies/labs: Other co-morbidities: depression, thrombocytopenia  Relapses:      Past treatment history: none  Prednisone:  Prednisone (3/2019-2019)  Non-biologic DMARD: Cellcept 3 gms daily (3/12/2019-present)  Other:     HISTORY OF PRESENT ILLNESS     This is a 34 y.o. female with hx of depression, thrombocytopenia. MHAQ:0  Pain scale: 5/10  The patient notes she is sick all the time. She keeps getting repeated infections. She had pink eye twice. She keeps getting UTIs, URIs. Her kids or  didn't have it. She had a viral infection and was in the hospital twice  In 2019. She is taking cellcept 3 gms daily with no prednisone. She does not feel like her leg lesion is improving anymore. Her high lesion has slightly improved. She is still getting nausea occasionally with the cellcept. REVIEW OF SYSTEMS    A comprehensive review of systems was performed and pertinent results are documented in the HPI, review of systems is otherwise non-contributory. PAST MEDICAL HISTORY    Past Medical History:   Diagnosis Date    Abnormal Papanicolaou smear of cervix     Acute pyelonephritis 3/22/2018    Anemia     Anxiety     Coagulation disorder ([address]     thrombocytopenia with pregnancy's    Depression     Disease of blood and blood forming organ     thrombocytopenia    Fetal heart disease 2017    Localized scleroderma         Past Surgical History:   Procedure Laterality Date    BREAST SURGERY PROCEDURE UNLISTED  12/15/2018    DR. LORENZO SANABRIA     DELIVERY ONLY      HX  SECTION  2017    HX GYN  10/2015        HX HEENT      T&A       FAMILY HISTORY    Family History   Problem Relation Age of Onset    No Known Problems Mother     No Known Problems Father        SOCIAL HISTORY    Social History     Tobacco Use    Smoking status: Never Smoker    Smokeless tobacco: Never Used   Substance Use Topics    Alcohol use: Yes     Comment: weekends    Drug use: No     NO pregnancy losses  MEDICATIONS    Current Outpatient Medications   Medication Sig Dispense Refill    doxycycline (ADOXA) 100 mg tablet Take 1 Tab by mouth two (2) times a day for 10 days. 20 Tab 0    erythromycin (ILOTYCIN) ophthalmic ointment Instill 1/2\" of ointment into affected eye(s) 4 times daily for 7 days. 1 Tube 0    fluticasone propionate (FLONASE) 50 mcg/actuation nasal spray 2 sprays in each nostril once daily as needed for nasal congestion. 1 Bottle 2    busPIRone (BUSPAR) 10 mg tablet Take 1 Tab by mouth three (3) times daily. 90 Tab 5    ALPRAZolam (XANAX) 0.5 mg tablet Take 1 Tab by mouth three (3) times daily as needed for Anxiety. Max Daily Amount: 1.5 mg. 15 Tab 0    sertraline (ZOLOFT) 50 mg tablet Take 1 Tab by mouth daily. 30 Tab 1    buPROPion XL (WELLBUTRIN XL) 300 mg XL tablet Take 1 Tab by mouth every morning. 90 Tab 2    cholecalciferol (VITAMIN D3) 1,000 unit tablet Take 1 Tab by mouth daily. 90 Tab 2    traZODone (DESYREL) 100 mg tablet Take 1 Tab by mouth nightly. 90 Tab 2    promethazine (PHENERGAN) 25 mg tablet Take 1 Tab by mouth every six (6) hours as needed for Nausea. 30 Tab 1    ondansetron (ZOFRAN ODT) 4 mg disintegrating tablet Take 1 Tab by mouth every eight (8) hours as needed for Nausea. 15 Tab 1    OTHER Patient reported takes Calcium unsure of dosage      ascorbic acid, vitamin C, (VITAMIN C) 250 mg tablet Take  by mouth daily.  mycophenolate (CELLCEPT) 500 mg tablet Start 1 tab twice daily for 7 days then 3 tabs daily for 7 days then 2 tabs twice daily for 7 days then 3 tabs twice daily (Patient taking differently: 1,500 mg.  Start 1 tab twice daily for 7 days then 3 tabs daily for 7 days then 2 tabs twice daily for 7 days then 3 tabs twice daily) 180 Tab 3       ALLERGIES    Allergies   Allergen Reactions    Morphine Hives       PHYSICAL EXAMINATION    Visit Vitals  /83 (BP 1 Location: Left arm, BP Patient Position: Sitting)   Pulse 72   Temp 98.6 °F (37 °C) (Oral)   Resp 16   Ht 5' 7\" (1.702 m)   Wt 156 lb (70.8 kg)   SpO2 98%   BMI 24.43 kg/m²     Body mass index is 24.43 kg/m². General: NAD  HEENT: PERRL, anicteric, non-injected sclerae; oropharynx without ulcers, erythema, or exudate. Moist mucous membranes. Lymphatic: No cervical or axillary lymphadenopathy. Cardiovascular: S1, S2,no R/M/G  Pulmonary: CTA b/l. No wheezes/rales/rhonchi. Abdominal: Soft,NTND, + BS. Skin:   Upper back: 7x5  Right lateral le cm  Back of right thigh:  2-3 cm   Small nodule on abdomen unchanged    Neuro: Alert; able to carry normal conversation    Musculoskeletal:   Cervical & Lumbar Spine  Neck and spine have no noted deformities or signs of inflammation. Curvature of cervical, thoracic, and lumbar spine are within normal limits. Wrist, Hand, & Fingers  No bony deformities, inflammation, or tenderness of bony prominences. No anatomical snuff box tenderness; Full ROM in DIP, PIP, MCP, & carpal joints & with supination and pronation. Elbow  No bony deformities, inflammation, or tenderness in olecranon, medial, lateral epicondyle elbow. Full ROM upon flexion and extension. Shoulder  No bony deformities, inflammation, or tenderness in rotator cuff, biceps tendon, or acromioclavicular joint. Full ROM and strength in shoulder upon adduction, abduction, internal and external rotation. Hip  No bony deformities, inflammation, or tenderness in hip joint. Knee  FROM of the knee. NO swelling or warmth noted.  No bony deformity noted    Ankle/toes  No bony deformities, inflammation or tenderness    Physical Exam   Skin:        Lesions appear smaller today      DATA REVIEW    Prior medical records were reviewed and if applicable are summarized as below:    Labs:   7/2019: vitamin D, TSH, urinalysis normal, Cbc, cmp unremarkable  5/2019: cbc with mild thrombocytopenia, CMP unremarkable  4/2019: cbc and CMP unremarkable  1/2019: Quant gold, Hep B, C negative, BNP, UPCR, CK normal, smith/RNP, RF, SSa/B, SCL 70, RNA Polymerase III, myositis panel, DMITRIY-1, anti-centromere, DAYTON negative, Cr 1.08; LFTs wnl, Plt 120  10/2018: CRP, ESR, cmp normal, Plt 114    Imaging:   CXR (1/2019): negative  CT chest high res (1/2019): normal, b/l nephrolithiasis  TTE (1/2019): EF 60-65%    Pathology:  Right breast mass and left upper back skin biopsy (12/2018): localized scleroderma (morphea). ASSESSMENT AND PLAN    A 34 y.o. female with hx of thrombocytopenia, localized scleroderma on cellcept 3 gms daily  presents for a follow up visit. The lesions have stopped growing and are smaller in size today. They are also more faint but the patient is concerned as they are still not resolved. Further, she has been having recurrent infections since starting the cellcept and this is extremely bothersome to her. # Localized scleroderma (Morphea):   - continue cellcept 3 gms daily for now. - patient to go to [facility] for a second opinion    # Recurrent infections:  - HIV  - immunoglobulins    # Med toxicity:  - on calcium and Vitamin D   - Hepatitis panel and quant gold negative 1/2019  - s/p tubal ligation per the patient  - cbc, cmp unremarkable in 7/2019    The patient voiced understanding of the aforementioned assessment and plan. Summary of plan was provided in the After Visit Summary patient instructions. I also provided education about MyChart setup and utility. Ms. Cynthia Hernandez has a reminder for a \"due or due soon\" health maintenance. I have asked that she contact her primary care provider for follow-up on this health maintenance.     TODAY'S ORDERS    Orders Placed This Encounter    HIV 1/2 AG/AB, 4TH GENERATION,W RFLX CONFIRM    IMMUNOGLOBULINS, G/A/M, QT.        Future Appointments   Date Time Provider Arlene Gamble   9/20/2019 10:00 AM Herb Bolanos NP [address]   12/3/2019  9:20 AM Annika Lyons MD [address] [facility]   1/24/2020 11:30 AM Aiyana Greene NP [address]       Esther Conway MD    Adult Rheumatology   [facility], [address] [facility], [address]   Phone [phone]  Fax [phone]

## 2019-09-10 NOTE — PROGRESS NOTES
Chief Complaint   Patient presents with    Other     scleraderma     1. Have you been to the ER, urgent care clinic since your last visit? Hospitalized since your last visit? Yes Where: [facility] [place]    2. Have you seen or consulted any other health care providers outside of the [address] [place] [address] since your last visit? Include any pap smears or colon screening.  No

## 2019-09-11 LAB
HIV 1+2 AB+HIV1 P24 AG SERPL QL IA: NON REACTIVE
IGA SERPL-MCNC: 158 MG/DL (ref 87–352)
IGG SERPL-MCNC: 1063 MG/DL (ref 700–1600)
IGM SERPL-MCNC: 46 MG/DL (ref 26–217)

## 2019-09-20 ENCOUNTER — OFFICE VISIT (OUTPATIENT)
Dept: FAMILY MEDICINE CLINIC | Age: 34
End: 2019-09-20

## 2019-09-20 VITALS
RESPIRATION RATE: 16 BRPM | DIASTOLIC BLOOD PRESSURE: 72 MMHG | BODY MASS INDEX: 25.46 KG/M2 | WEIGHT: 162.2 LBS | SYSTOLIC BLOOD PRESSURE: 106 MMHG | HEIGHT: 67 IN | OXYGEN SATURATION: 100 % | HEART RATE: 78 BPM | TEMPERATURE: 98.4 F

## 2019-09-20 DIAGNOSIS — F33.1 MODERATE EPISODE OF RECURRENT MAJOR DEPRESSIVE DISORDER (HCC): Primary | ICD-10-CM

## 2019-09-20 DIAGNOSIS — F41.9 ANXIETY: ICD-10-CM

## 2019-09-20 DIAGNOSIS — Z23 ENCOUNTER FOR IMMUNIZATION: ICD-10-CM

## 2019-09-20 RX ORDER — ALPRAZOLAM 0.5 MG/1
0.5 TABLET ORAL
Qty: 15 TAB | Refills: 0 | Status: SHIPPED | OUTPATIENT
Start: 2019-09-20 | End: 2020-07-28

## 2019-09-20 RX ORDER — SERTRALINE HYDROCHLORIDE 100 MG/1
100 TABLET, FILM COATED ORAL DAILY
Qty: 90 TAB | Refills: 1 | Status: SHIPPED | OUTPATIENT
Start: 2019-09-20 | End: 2020-01-10 | Stop reason: SDUPTHER

## 2019-09-20 RX ORDER — VALACYCLOVIR HYDROCHLORIDE 1 G/1
TABLET, FILM COATED ORAL
Refills: 0 | COMMUNITY
Start: 2019-08-02 | End: 2019-09-20 | Stop reason: ALTCHOICE

## 2019-09-20 NOTE — PROGRESS NOTES
Chief Complaint   Patient presents with    Follow-up     1. Have you been to the ER, urgent care clinic since your last visit? Hospitalized since your last visit? No    2. Have you seen or consulted any other health care providers outside of the [address] [place] [address] since your last visit? Include any pap smears or colon screening. Yes When: Rheumatology one week ago     Verbal Order received from Enrico Gaucher, NP for influenza given IM in right arm.

## 2019-09-20 NOTE — PROGRESS NOTES
HISTORY OF PRESENT ILLNESS  Teresa Rodriguez is a 34 y.o. female. HPI  Patient with PMHx significant for scleroderma, mild thrombocytopenia, depression/anxiety here today for a 1 month anxiety follow up. At her last visit in August 2019, she reported that her anxiety had been out of control lately. She was feeling overwhelmed, can't shut her mind off. She would get so worked up that she couldn't come down from it. She is so overwhelmed by her newly-diagnosed health issues and needs a release and isn't sure what to do. She reports that she does okay at work but this is affecting her home life significantly. She doesn't think the buspar is helping her as much as it was previously. Had also been taking her wellbutrin 300mg XL daily and trazodone 100mg QHS. She has never been a good sleeper. Denies SI/HI. FERNANDO score of 17 at that time. At that time, she was started on zoloft 50mg daily, buspar was increased to TID (from BID) at the same dosage (10mg), and she was given #15 x 0 xanax 0.5mg to be taken in situations of extreme anxiety. She reports today that symptoms have improved since her last visit here 1 month ago. She has taken 5 of the xanax so far out of a total of #15 that were prescribed at her last visit. She used them when she felt overwhelmed and twice for sleep. She thinks that it is more of a \"safety net\" for her, knowing that she has something she can take if needed. She has also been taking the zoloft 50mg in addition to the wellbutrin 300mg XL daily and the buspar 10mg BID (could not remember to take it TID). She doesn't always take the trazodone 100mg at night, as sometimes she thinks it makes it harder for her to fall back asleep after she does wake up in the night if she does take it. PHQ-9 score of 5, FERNANDO score of 13 today. She has not scheduled with psychiatry yet. Has an appt with [facility] November 13th for a second opinion for rheumatology, which she is excited out.      Visit Vitals  /72 (BP 1 Location: Right arm, BP Patient Position: Sitting)   Pulse 78   Temp 98.4 °F (36.9 °C) (Oral)   Resp 16   Ht 5' 7\" (1.702 m)   Wt 162 lb 3.2 oz (73.6 kg)   LMP 09/05/2019   SpO2 100%   BMI 25.40 kg/m²     Current Outpatient Medications on File Prior to Visit   Medication Sig Dispense Refill    busPIRone (BUSPAR) 10 mg tablet Take 1 Tab by mouth three (3) times daily. 90 Tab 5    buPROPion XL (WELLBUTRIN XL) 300 mg XL tablet Take 1 Tab by mouth every morning. 90 Tab 2    cholecalciferol (VITAMIN D3) 1,000 unit tablet Take 1 Tab by mouth daily. 90 Tab 2    traZODone (DESYREL) 100 mg tablet Take 1 Tab by mouth nightly. 90 Tab 2    OTHER Patient reported takes Calcium unsure of dosage      ascorbic acid, vitamin C, (VITAMIN C) 250 mg tablet Take  by mouth daily.  mycophenolate (CELLCEPT) 500 mg tablet Start 1 tab twice daily for 7 days then 3 tabs daily for 7 days then 2 tabs twice daily for 7 days then 3 tabs twice daily (Patient taking differently: 1,500 mg. Start 1 tab twice daily for 7 days then 3 tabs daily for 7 days then 2 tabs twice daily for 7 days then 3 tabs twice daily) 180 Tab 3    fluticasone propionate (FLONASE) 50 mcg/actuation nasal spray 2 sprays in each nostril once daily as needed for nasal congestion. 1 Bottle 2    promethazine (PHENERGAN) 25 mg tablet Take 1 Tab by mouth every six (6) hours as needed for Nausea. 30 Tab 1    ondansetron (ZOFRAN ODT) 4 mg disintegrating tablet Take 1 Tab by mouth every eight (8) hours as needed for Nausea. 15 Tab 1     No current facility-administered medications on file prior to visit. Review of Systems   Constitutional: Negative for chills and fever. HENT: Negative for congestion and sore throat. Respiratory: Negative for cough, shortness of breath and wheezing. Cardiovascular: Negative for chest pain and palpitations. Psychiatric/Behavioral: Positive for depression.  Negative for hallucinations, substance abuse and suicidal ideas. The patient is nervous/anxious and has insomnia. Physical Exam   Constitutional: She is oriented to person, place, and time. She appears well-developed and well-nourished. No distress. HENT:   Head: Normocephalic and atraumatic. Nose: Nose normal.   Mouth/Throat: No oropharyngeal exudate. Cardiovascular: Normal rate, regular rhythm and normal heart sounds. Pulmonary/Chest: Effort normal and breath sounds normal. No respiratory distress. She has no wheezes. Lymphadenopathy:     She has no cervical adenopathy. Neurological: She is alert and oriented to person, place, and time. Skin: Skin is warm and dry. She is not diaphoretic. Psychiatric: She has a normal mood and affect. Her behavior is normal. Judgment normal.   Nursing note and vitals reviewed. ASSESSMENT and PLAN    ICD-10-CM ICD-9-CM    1. Moderate episode of recurrent major depressive disorder (HCC) F33.1 296.32 sertraline (ZOLOFT) 100 mg tablet   2. Anxiety F41.9 300.00 sertraline (ZOLOFT) 100 mg tablet      ALPRAZolam (XANAX) 0.5 mg tablet   3. Encounter for immunization Z23 V03.89 [place] IMMUNIZ ADMIN,1 SINGLE/COMB VAC/TOXOID      INFLUENZA VIRUS VAC QUAD,SPLIT,PRESV FREE SYRINGE IM     1. Depression/Anxiety - Mild improvement in symptoms. We discussed various options at this point. We will increase zoloft to 100mg daily and I have asked her to try a higher dose of trazodone at night for sleep (200mg QHS) and let me know if that is helpful for her sleep symptoms. I have also given her an additional #15 x 0 today of xanax to be filled over the next few months if needed, but I have asked her to schedule with psychiatry in case this will need to be continued. However, she is hopeful that after her visit with [facility] rheum in November that her symptoms will be under better control, and I am hopeful about this as well. Return in January as previously scheduled for routine follow up, sooner PRN. Follow-up and Dispositions    · Return in about 4 months (around 1/20/2020) for follow up.

## 2019-10-26 DIAGNOSIS — N30.00 ACUTE CYSTITIS WITHOUT HEMATURIA: ICD-10-CM

## 2019-10-26 DIAGNOSIS — M34.9 SCLERODERMA (HCC): ICD-10-CM

## 2019-10-28 DIAGNOSIS — M34.9 SCLERODERMA (HCC): ICD-10-CM

## 2019-10-28 DIAGNOSIS — N30.00 ACUTE CYSTITIS WITHOUT HEMATURIA: ICD-10-CM

## 2019-10-28 RX ORDER — PROMETHAZINE HYDROCHLORIDE 25 MG/1
25 TABLET ORAL
Qty: 30 TAB | Refills: 1 | Status: SHIPPED | OUTPATIENT
Start: 2019-10-28 | End: 2020-01-10 | Stop reason: SDUPTHER

## 2019-10-28 RX ORDER — ONDANSETRON 4 MG/1
4 TABLET, ORALLY DISINTEGRATING ORAL
Qty: 15 TAB | Refills: 1 | Status: SHIPPED | OUTPATIENT
Start: 2019-10-28 | End: 2020-07-28

## 2019-10-28 RX ORDER — PROMETHAZINE HYDROCHLORIDE 25 MG/1
25 TABLET ORAL
Qty: 30 TAB | Refills: 1 | Status: SHIPPED | OUTPATIENT
Start: 2019-10-28 | End: 2020-07-28

## 2019-10-28 RX ORDER — ONDANSETRON 4 MG/1
4 TABLET, ORALLY DISINTEGRATING ORAL
Qty: 15 TAB | Refills: 1 | Status: SHIPPED | OUTPATIENT
Start: 2019-10-28 | End: 2020-01-10 | Stop reason: SDUPTHER

## 2019-12-13 ENCOUNTER — TELEPHONE (OUTPATIENT)
Dept: SURGERY | Age: 34
End: 2019-12-13

## 2019-12-13 NOTE — TELEPHONE ENCOUNTER
Spoke with patient informed her notes and pathology reports were faxed today 12/13/19 to [facility] [phone], conformation received. No slides available to send. Express understanding.

## 2019-12-13 NOTE — TELEPHONE ENCOUNTER
Patient is calling to see if we have received the release forms from [facility] and that the stuff has been sent over, she said its delaying her treatment, please call her back to verify.

## 2020-01-10 ENCOUNTER — OFFICE VISIT (OUTPATIENT)
Dept: FAMILY MEDICINE CLINIC | Age: 35
End: 2020-01-10

## 2020-01-10 VITALS
WEIGHT: 162 LBS | TEMPERATURE: 97.5 F | HEART RATE: 83 BPM | BODY MASS INDEX: 25.43 KG/M2 | OXYGEN SATURATION: 99 % | DIASTOLIC BLOOD PRESSURE: 82 MMHG | HEIGHT: 67 IN | RESPIRATION RATE: 18 BRPM | SYSTOLIC BLOOD PRESSURE: 132 MMHG

## 2020-01-10 DIAGNOSIS — F41.9 ANXIETY: ICD-10-CM

## 2020-01-10 DIAGNOSIS — F33.1 MODERATE EPISODE OF RECURRENT MAJOR DEPRESSIVE DISORDER (HCC): Primary | ICD-10-CM

## 2020-01-10 RX ORDER — BUSPIRONE HYDROCHLORIDE 10 MG/1
10 TABLET ORAL 2 TIMES DAILY
COMMUNITY
End: 2020-01-10 | Stop reason: SDUPTHER

## 2020-01-10 RX ORDER — BUSPIRONE HYDROCHLORIDE 10 MG/1
10 TABLET ORAL 2 TIMES DAILY
Qty: 60 TAB | Refills: 6 | Status: SHIPPED | OUTPATIENT
Start: 2020-01-10 | End: 2020-05-04 | Stop reason: SDUPTHER

## 2020-01-10 RX ORDER — LYSINE HCL 500 MG
1 TABLET ORAL DAILY
COMMUNITY
End: 2020-07-28

## 2020-01-10 RX ORDER — SERTRALINE HYDROCHLORIDE 100 MG/1
100 TABLET, FILM COATED ORAL DAILY
Qty: 30 TAB | Refills: 6 | Status: SHIPPED | OUTPATIENT
Start: 2020-01-10 | End: 2020-05-04 | Stop reason: SDUPTHER

## 2020-01-10 RX ORDER — TRAZODONE HYDROCHLORIDE 100 MG/1
100 TABLET ORAL
Qty: 30 TAB | Refills: 6 | Status: SHIPPED | OUTPATIENT
Start: 2020-01-10 | End: 2020-05-04 | Stop reason: SDUPTHER

## 2020-01-10 RX ORDER — BUPROPION HYDROCHLORIDE 300 MG/1
300 TABLET ORAL
Qty: 30 TAB | Refills: 6 | Status: SHIPPED | OUTPATIENT
Start: 2020-01-10 | End: 2020-05-04 | Stop reason: SDUPTHER

## 2020-01-10 RX ORDER — MYCOPHENOLIC ACID 360 MG/1
360 TABLET, DELAYED RELEASE ORAL 2 TIMES DAILY
COMMUNITY
End: 2020-07-28

## 2020-01-10 NOTE — PROGRESS NOTES
HISTORY OF PRESENT ILLNESS  Cesia Snow is a 30 y.o. female. HPI    Here today for 6 month anxiety follow up. In August 2019, she reported that her anxiety had been out of control. She was feeling overwhelmed, can't shut her mind off. She would get so worked up that she couldn't come down from it. She is so overwhelmed by her newly-diagnosed health issues and needs a release and isn't sure what to do. She reports that she does okay at work but this is affecting her home life significantly. She doesn't think the buspar is helping her as much as it was previously. Had also been taking her wellbutrin 300mg XL daily and trazodone 100mg QHS. She has never been a good sleeper. Denies SI/HI. FERNANDO score of 17 at that time. Her medications were adjusted in September 2019, and she is now taking buspar 10mg BID, wellbutrin XL 300mg daily, zoloft 100mg daily, and trazodone 100 - 200mg nightly (if her  is home and she can sleep 8+ hours, will take 200mg, otherwise will take only 100mg). She reports that her depression and anxiety are both under excellent control. She was given #15 x 0 xanax 0.5mg to be taken PRN at her appt in September; she did not fill this prescription until December 2019, and has taken only 2 so far. PHQ-9 score of 4 today. She saw a rheumatology at [facility] in November 2019; her scleroderma medication was adjusted, and she is having fewer GI side effects than she was prevoiusly. However, her platelets have been dropping so she will see heme-onc at [facility] later this month. Goes back to see rheum in April 2020.     Visit Vitals  /82   Pulse 83   Temp 97.5 °F (36.4 °C) (Oral)   Resp 18   Ht 5' 7\" (1.702 m)   Wt 162 lb (73.5 kg)   LMP 12/22/2019 (Approximate)   SpO2 99%   Breastfeeding No   BMI 25.37 kg/m²     Current Outpatient Medications on File Prior to Visit   Medication Sig Dispense Refill    mycophenolate sodium (MYFORTIC) 360 mg delayed release tablet Take 360 mg by mouth two (2) times a day.  Calcium Carbonate-Vit D3-Min 600 mg calcium- 400 unit tab Take 1 Tab by mouth daily.  promethazine (PHENERGAN) 25 mg tablet Take 1 Tab by mouth every six (6) hours as needed for Nausea. 30 Tab 1    ondansetron (ZOFRAN ODT) 4 mg disintegrating tablet Take 1 Tab by mouth every eight (8) hours as needed for Nausea. 15 Tab 1    ALPRAZolam (XANAX) 0.5 mg tablet Take 1 Tab by mouth three (3) times daily as needed for Anxiety. Max Daily Amount: 1.5 mg. 15 Tab 0    cholecalciferol (VITAMIN D3) 1,000 unit tablet Take 1 Tab by mouth daily. 90 Tab 2    ascorbic acid, vitamin C, (VITAMIN C) 250 mg tablet Take  by mouth daily.  [DISCONTINUED] busPIRone (BUSPAR) 10 mg tablet Take 10 mg by mouth two (2) times a day.  [DISCONTINUED] promethazine (PHENERGAN) 25 mg tablet Take 1 Tab by mouth every six (6) hours as needed for Nausea. 30 Tab 1    [DISCONTINUED] ondansetron (ZOFRAN ODT) 4 mg disintegrating tablet Take 1 Tab by mouth every eight (8) hours as needed for Nausea. 15 Tab 1    [DISCONTINUED] mycophenolate (CELLCEPT) 500 mg tablet Take 6 tablets daily 540 Tab 1    [DISCONTINUED] sertraline (ZOLOFT) 100 mg tablet Take 1 Tab by mouth daily. 90 Tab 1    fluticasone propionate (FLONASE) 50 mcg/actuation nasal spray 2 sprays in each nostril once daily as needed for nasal congestion. 1 Bottle 2    [DISCONTINUED] busPIRone (BUSPAR) 10 mg tablet Take 1 Tab by mouth three (3) times daily. 90 Tab 5    [DISCONTINUED] buPROPion XL (WELLBUTRIN XL) 300 mg XL tablet Take 1 Tab by mouth every morning. 90 Tab 2    [DISCONTINUED] traZODone (DESYREL) 100 mg tablet Take 1 Tab by mouth nightly. 80 Tab 2    [DISCONTINUED] OTHER Patient reported takes Calcium unsure of dosage       No current facility-administered medications on file prior to visit. Review of Systems   Constitutional: Negative for chills, fever and malaise/fatigue.    Cardiovascular: Negative for chest pain, palpitations and leg swelling. Musculoskeletal: Negative for myalgias. Psychiatric/Behavioral: Negative for depression, hallucinations, substance abuse and suicidal ideas. The patient is not nervous/anxious and does not have insomnia. Physical Exam  Vitals signs and nursing note reviewed. Constitutional:       General: She is not in acute distress. Appearance: She is well-developed. She is not diaphoretic. HENT:      Head: Normocephalic and atraumatic. Cardiovascular:      Rate and Rhythm: Normal rate and regular rhythm. Pulmonary:      Effort: Pulmonary effort is normal.   Skin:     General: Skin is warm and dry. Neurological:      Mental Status: She is alert and oriented to person, place, and time. Psychiatric:         Behavior: Behavior normal.         Judgment: Judgment normal.         ASSESSMENT and PLAN    ICD-10-CM ICD-9-CM    1. Moderate episode of recurrent major depressive disorder (HCC) F33.1 296.32 sertraline (ZOLOFT) 100 mg tablet      traZODone (DESYREL) 100 mg tablet      buPROPion XL (WELLBUTRIN XL) 300 mg XL tablet   2. Anxiety F41.9 300.00 busPIRone (BUSPAR) 10 mg tablet      sertraline (ZOLOFT) 100 mg tablet      buPROPion XL (WELLBUTRIN XL) 300 mg XL tablet     Anxiety and depression are under good control; continue current medications. If needed, okay for another #15 x 0 xanax refill prior to her next appointment, though I suspect she will not need it. Follow up with rheum and heme-onc as scheduled. Return in 6 months for CPE, sooner PRN. Follow-up and Dispositions    · Return in about 6 months (around 7/10/2020) for CPE, sooner PRN.

## 2020-01-10 NOTE — PROGRESS NOTES
Chief Complaint   Patient presents with    Depression    Follow Up Chronic Condition     1. Have you been to the ER, urgent care clinic since your last visit? Hospitalized since your last visit? No    2. Have you seen or consulted any other health care providers outside of the [address] [place] [address] since your last visit? Include any pap smears or colon screening. Yes Reason for visit: Rheumatologist at [address] [place] [address] patient with two types of identifiers. Verified pharmacy with patient. Verified medications with the patient.

## 2020-01-28 ENCOUNTER — TELEPHONE (OUTPATIENT)
Dept: FAMILY MEDICINE CLINIC | Age: 35
End: 2020-01-28

## 2020-01-28 RX ORDER — POLYMYXIN B SULFATE AND TRIMETHOPRIM 1; 10000 MG/ML; [USP'U]/ML
1 SOLUTION OPHTHALMIC EVERY 6 HOURS
Qty: 1 BOTTLE | Refills: 0 | Status: SHIPPED | OUTPATIENT
Start: 2020-01-28 | End: 2020-02-07

## 2020-01-28 NOTE — TELEPHONE ENCOUNTER
----- Message from Indy Pugh sent at 1/28/2020  9:15 AM EST -----  Regarding: Prescription Question  Contact: [phone]  Good morning. My Daughter has pinkeye and of course I woke up with it this morning. Is there anyway you can send me and some drops instead of the gel. I cannot find a gel that I had left but I would prefer drops if you can.  Thanks so much

## 2020-01-28 NOTE — TELEPHONE ENCOUNTER
VO per NP will sent eye drops in. My chart message sent informing patient that we will send in today.

## 2020-03-06 NOTE — ED PROVIDER NOTES
EMERGENCY DEPARTMENT HISTORY AND PHYSICAL EXAM      Date: 3/22/2018  Patient Name: Cassidy Wheeler    History of Presenting Illness     Chief Complaint   Patient presents with    Flank Pain     Ambulatory w/ referral from [facility] for pyelonephritis, pt reports flank/abdominal pain x3 days    Abdominal Pain       History Provided By: Patient    HPI: Cassidy Wheeler, 29 y.o. female with PMHx significant for anemia, presents ambulatory to the ED from [facility] with cc of right flank pain x ~3-4 days. She states her pain radiates to her RLQ. Pt reports associated nausea and decreased appetite, noting she has not attempted PO since onset of her pain. She also c/o urinary pressure since this morning. Pt reports hx of pyelonephritis, with which she states her current symptoms are consistent. She states she required admission for IV abx with last episode. Pt denies hx of kidney stones. She reports hx of  x2, but denies hx of appendectomy or cholecystectomy. Pt denies chance of pregnancy, noting she recently ended her menstrual cycle. She denies taking birth control. Pt specifically denies vomiting, diarrhea, frequency, hematuria, CP, or SOB. PCP: Sammy Dobbs NP    There are no other complaints, changes, or physical findings at this time. Current Facility-Administered Medications   Medication Dose Route Frequency Provider Last Rate Last Dose    sodium chloride (NS) flush 5-10 mL  5-10 mL IntraVENous PRN Floyd Slater MD        HYDROmorphone (DILAUDID) 2 mg/mL injection             levoFLOXacin (LEVAQUIN) 750 mg in D5W IVPB  750 mg IntraVENous NOW Floyd Slater MD         Current Outpatient Prescriptions   Medication Sig Dispense Refill    acetaminophen (TYLENOL EXTRA STRENGTH) 500 mg tablet Take 1,000 mg by mouth every six (6) hours as needed for Pain.  venlafaxine-SR (EFFEXOR-XR) 75 mg capsule Take 1 Cap by mouth daily.  30 Cap 1       Past History     Past Medical History:  Past Medical History: Diagnosis Date    Abnormal Papanicolaou smear of cervix     Anemia     Coagulation disorder (HCC)     thrombocytopenia with pregnancy's    Disease of blood and blood forming organ     thrombocytopenia    Fetal heart disease 2017       Past Surgical History:  Past Surgical History:   Procedure Laterality Date     DELIVERY ONLY      HX  SECTION  2015    HX GYN  10/2015        HX HEENT      T&A       Family History:  Family History   Problem Relation Age of Onset    No Known Problems Mother     No Known Problems Father        Social History:  Social History   Substance Use Topics    Smoking status: Never Smoker    Smokeless tobacco: Never Used    Alcohol use Yes      Comment: occasion       Allergies: Allergies   Allergen Reactions    Morphine Hives         Review of Systems   Review of Systems   Constitutional: Negative. Negative for chills and fever. HENT: Negative. Negative for congestion, facial swelling, rhinorrhea, sore throat, trouble swallowing and voice change. Eyes: Negative. Respiratory: Negative. Negative for apnea, cough, chest tightness, shortness of breath and wheezing. Cardiovascular: Negative. Negative for chest pain, palpitations and leg swelling. Gastrointestinal: Positive for nausea. Negative for abdominal distention, abdominal pain, blood in stool, constipation, diarrhea and vomiting. Endocrine: Negative. Negative for cold intolerance, heat intolerance and polyuria. Genitourinary: Positive for flank pain (right). Negative for difficulty urinating, dysuria, frequency, hematuria and urgency. Musculoskeletal: Negative for arthralgias, back pain, myalgias, neck pain and neck stiffness. Skin: Negative. Negative for color change and rash. Neurological: Negative. Negative for dizziness, syncope, facial asymmetry, speech difficulty, weakness, light-headedness, numbness and headaches. Hematological: Negative.   Does not bruise/bleed easily. Psychiatric/Behavioral: Negative. Negative for confusion and self-injury. The patient is not nervous/anxious. Physical Exam   Physical Exam   Constitutional: She is oriented to person, place, and time. She appears well-developed and well-nourished. No distress. HENT:   Head: Normocephalic and atraumatic. Mouth/Throat: Oropharynx is clear and moist. Mucous membranes are dry. No oropharyngeal exudate. Eyes: Conjunctivae and EOM are normal. Pupils are equal, round, and reactive to light. Neck: Normal range of motion. Cardiovascular: Regular rhythm and normal heart sounds. Tachycardia present. Exam reveals no gallop and no friction rub. No murmur heard. Pulmonary/Chest: Effort normal and breath sounds normal. No respiratory distress. She has no wheezes. She has no rales. She exhibits no tenderness. Abdominal: Soft. Bowel sounds are normal. She exhibits no distension and no mass. There is no tenderness. There is CVA tenderness (right). There is no rebound, no guarding, no tenderness at McBurney's point and negative Villar's sign. Right flank pain   Musculoskeletal: Normal range of motion. She exhibits no edema, tenderness or deformity. Neurological: She is alert and oriented to person, place, and time. She displays normal reflexes. No cranial nerve deficit. She exhibits normal muscle tone. Coordination normal.   Skin: Skin is warm. No rash noted. She is not diaphoretic. Psychiatric: She has a normal mood and affect. Nursing note and vitals reviewed.         Diagnostic Study Results     Labs -     Recent Results (from the past 12 hour(s))   CBC WITH AUTOMATED DIFF    Collection Time: 03/22/18 11:00 AM   Result Value Ref Range    WBC 15.4 (H) 3.6 - 11.0 K/uL    RBC 4.00 3.80 - 5.20 M/uL    HGB 12.5 11.5 - 16.0 g/dL    HCT 37.4 35.0 - 47.0 %    MCV 93.5 80.0 - 99.0 FL    MCH 31.3 26.0 - 34.0 PG    MCHC 33.4 30.0 - 36.5 g/dL    RDW 13.0 11.5 - 14.5 %    PLATELET 95 (L) 150 - 400 K/uL    MPV 10.9 8.9 - 12.9 FL    NRBC 0.0 0  WBC    ABSOLUTE NRBC 0.00 0.00 - 0.01 K/uL    NEUTROPHILS 83 (H) 32 - 75 %    LYMPHOCYTES 9 (L) 12 - 49 %    MONOCYTES 7 5 - 13 %    EOSINOPHILS 1 0 - 7 %    BASOPHILS 0 0 - 1 %    IMMATURE GRANULOCYTES 0 0.0 - 0.5 %    ABS. NEUTROPHILS 12.8 (H) 1.8 - 8.0 K/UL    ABS. LYMPHOCYTES 1.4 0.8 - 3.5 K/UL    ABS. MONOCYTES 1.1 (H) 0.0 - 1.0 K/UL    ABS. EOSINOPHILS 0.1 0.0 - 0.4 K/UL    ABS. BASOPHILS 0.0 0.0 - 0.1 K/UL    ABS. IMM. GRANS. 0.1 (H) 0.00 - 0.04 K/UL    DF AUTOMATED     URINALYSIS W/ REFLEX CULTURE    Collection Time: 03/22/18 11:00 AM   Result Value Ref Range    Color YELLOW/STRAW      Appearance CLOUDY (A) CLEAR      Specific gravity 1.016 1.003 - 1.030      pH (UA) 8.0 5.0 - 8.0      Protein 100 (A) NEG mg/dL    Glucose NEGATIVE  NEG mg/dL    Ketone 15 (A) NEG mg/dL    Bilirubin NEGATIVE  NEG      Blood SMALL (A) NEG      Urobilinogen 1.0 0.2 - 1.0 EU/dL    Nitrites NEGATIVE  NEG      Leukocyte Esterase MODERATE (A) NEG      WBC >100 (H) 0 - 4 /hpf    RBC 20-50 0 - 5 /hpf    Epithelial cells FEW FEW /lpf    Bacteria 1+ (A) NEG /hpf    UA:UC IF INDICATED URINE CULTURE ORDERED (A) CNI      Hyaline cast 0-2 0 - 5 /lpf   BETA HCG, QT    Collection Time: 03/22/18 11:00 AM   Result Value Ref Range    Beta HCG, QT <1 0 - 6 MIU/ML   METABOLIC PANEL, COMPREHENSIVE    Collection Time: 03/22/18 11:00 AM   Result Value Ref Range    Sodium 138 136 - 145 mmol/L    Potassium 3.9 3.5 - 5.1 mmol/L    Chloride 106 97 - 108 mmol/L    CO2 27 21 - 32 mmol/L    Anion gap 5 5 - 15 mmol/L    Glucose 79 65 - 100 mg/dL    BUN 9 6 - 20 MG/DL    Creatinine 0.68 0.55 - 1.02 MG/DL    BUN/Creatinine ratio 13 12 - 20      GFR est AA >60 >60 ml/min/1.73m2    GFR est non-AA >60 >60 ml/min/1.73m2    Calcium 8.5 8.5 - 10.1 MG/DL    Bilirubin, total 1.6 (H) 0.2 - 1.0 MG/DL    ALT (SGPT) 15 12 - 78 U/L    AST (SGOT) 13 (L) 15 - 37 U/L    Alk.  phosphatase 68 45 - 117 U/L    Protein, total 7.3 6.4 - 8.2 g/dL    Albumin 3.6 3.5 - 5.0 g/dL    Globulin 3.7 2.0 - 4.0 g/dL    A-G Ratio 1.0 (L) 1.1 - 2.2         Radiologic Studies -   CT Results  (Last 48 hours)               03/22/18 1309  CT ABD PELV W CONT Final result    Impression:  IMPRESSION:       1. Findings in the right kidney upper pole consistent with pyelonephritis. No   abscess is identified. The possibility of renal vascular etiology (infarct) is   thought to be less likely. 2. Bilateral renal calculi which are nonobstructing. Narrative:  INDICATION: Pyelonephritis, uncomplicated; right flank, RLQ pain; eval for acute   pyelonephritis and right flank pain       COMPARISON: None       TECHNIQUE:    Following the uneventful intravenous administration of 100 cc Isovue-370, thin   axial images were obtained through the abdomen and pelvis. Coronal and sagittal   reconstructions were generated. Oral contrast was not administered. CT dose   reduction was achieved through use of a standardized protocol tailored for this   examination and automatic exposure control for dose modulation. FINDINGS:    LUNG BASES: Clear. LIVER: No mass or biliary dilatation. GALLBLADDER: Unremarkable. SPLEEN: No mass. PANCREAS: No mass or ductal dilatation. ADRENALS: Unremarkable. KIDNEYS: The right kidney demonstrates areas of lucency in the superior pole. This is consistent with inflammatory or vascular etiology. There is no evidence   of renal abscess. No hydronephrosis. Small calcifications noted in the central   portions of both kidneys consistent with multiple small renal calculi. Left   kidney demonstrates no focal abnormality or hydronephrosis. GI: No dilatation or wall thickening. APPENDIX: Unremarkable. PERITONEUM: No ascites or pneumoperitoneum. RETROPERITONEUM: No lymphadenopathy or aortic aneurysm. URINARY BLADDER: No mass or calculus. PELVIS: No adenopathy or abnormal mass.  Dominant follicle noted in the right   ovary. BONES: No destructive bone lesion. ADDITIONAL COMMENTS: N/A               Medical Decision Making   I am the first provider for this patient. I reviewed the vital signs, available nursing notes, past medical history, past surgical history, family history and social history. Vital Signs-Reviewed the patient's vital signs. Patient Vitals for the past 12 hrs:   Temp Pulse Resp BP SpO2   03/22/18 1428 100.1 °F (37.8 °C) (!) 110 20 114/75 100 %   03/22/18 1317 - (!) 106 18 98/59 100 %   03/22/18 1030 98.3 °F (36.8 °C) (!) 105 20 126/77 100 %       Pulse Oximetry Analysis - 100% on RA    Records Reviewed: Nursing Notes, Old Medical Records, Previous electrocardiograms, Previous Radiology Studies and Previous Laboratory Studies    Provider Notes (Medical Decision Making):     DDx: acute pyelonephritis, renal colic, UTI, electrolyte disturbance, MERNA    28 yo Female presenting with right flank pain, nausea. Pt was seen at [facility], had WBC ~17.2, positive UA. Pt given 60 IM Toradol without improvement. Given hx, will check basic labs, UA, obtain CT imaging, provide IVF, IV Toradol, pain control, Zofran, and re-assess. ED Course:   Initial assessment performed. The patients presenting problems have been discussed, and they are in agreement with the care plan formulated and outlined with them. I have encouraged them to ask questions as they arise throughout their visit. 12:31 PM  Reviewed results, order pt dose of rocephin.    1:11 PM  Pt requesting additional pain medication. 1:58 PM  Pt re-evaluated, states she is having persistent pain and nausea, unable to tolerate PO. Pt states she was admitted 6 years ago for acute pyelo. Additional hx provided; pt states she and her  recently returned from a camping trip, states she was in a hot tub while there, and has multiple folliculitis-like areas to her upper chest and pack.  Pt states she is concerned she may have atypical infection that could have \"spread to her urine. \" Plan for admission to Hospitalist.    2:29 PM  Of note, pt did complete a course of Augmentin last week. Discussed with ED Pharmacist; given atypical infection, will cover with levaquin. Pt also now has low grade temp; will initiate sepsis protocol, blood cultures, lactate. CONSULT NOTE:   2:33 PM  Inge Sprague MD spoke with Angelita Price MD,   Specialty: Hospitalist  Discussed pt's hx, disposition, and available diagnostic and imaging results. Reviewed care plans. Consultant will evaluate pt for admission. Disposition:  ADMIT NOTE:  2:33 PM  The patient is being admitted to the hospital.  The results of their tests and reasons for their admission have been discussed with the patient and/or available family. They convey agreement and understanding for the need to be admitted and for their admission diagnosis. PLAN:  1. Admit to medicine    Diagnosis     Clinical Impression:   1. Acute pyelonephritis    2. Acute right flank pain    3. Sepsis, due to unspecified organism ([address]    4. Renal stones    5. Hot tub folliculitis        Attestations: This note is prepared by Julio Austin, acting as Scribe for Inge Sprague MD.    Inge Sprague MD: The scribe's documentation has been prepared under my direction and personally reviewed by me in its entirety. I confirm that the note above accurately reflects all work, treatment, procedures, and medical decision making performed by me. This note will not be viewable in [address] X Size Of Lesion In Cm (Optional): 0.9

## 2020-05-04 ENCOUNTER — TELEPHONE (OUTPATIENT)
Dept: FAMILY MEDICINE CLINIC | Age: 35
End: 2020-05-04

## 2020-05-04 DIAGNOSIS — F33.1 MODERATE EPISODE OF RECURRENT MAJOR DEPRESSIVE DISORDER (HCC): ICD-10-CM

## 2020-05-04 DIAGNOSIS — M34.9 SCLERODERMA (HCC): ICD-10-CM

## 2020-05-04 DIAGNOSIS — F41.9 ANXIETY: ICD-10-CM

## 2020-05-04 RX ORDER — BUPROPION HYDROCHLORIDE 300 MG/1
300 TABLET ORAL
Qty: 30 TAB | Refills: 6 | Status: SHIPPED | OUTPATIENT
Start: 2020-05-04 | End: 2020-10-29 | Stop reason: SDUPTHER

## 2020-05-04 RX ORDER — BUSPIRONE HYDROCHLORIDE 10 MG/1
10 TABLET ORAL 2 TIMES DAILY
Qty: 60 TAB | Refills: 6 | Status: SHIPPED | OUTPATIENT
Start: 2020-05-04 | End: 2020-10-29 | Stop reason: SDUPTHER

## 2020-05-04 RX ORDER — TRAZODONE HYDROCHLORIDE 100 MG/1
100 TABLET ORAL
Qty: 30 TAB | Refills: 6 | Status: SHIPPED | OUTPATIENT
Start: 2020-05-04 | End: 2020-07-28

## 2020-05-04 RX ORDER — SERTRALINE HYDROCHLORIDE 100 MG/1
100 TABLET, FILM COATED ORAL DAILY
Qty: 30 TAB | Refills: 6 | Status: SHIPPED | OUTPATIENT
Start: 2020-05-04 | End: 2020-10-29 | Stop reason: SDUPTHER

## 2020-05-04 NOTE — TELEPHONE ENCOUNTER
Informed patient that I will be leaving the practice June 5, 2020 and that she will receive a letter that outlines her options should she decide to stay within the practice. She verbalized understanding. 6 months' worth of refills sent to patient's pharmacy. Advised her to establish with a new provider within that 6 month period for further refills.   Delmar Nelson NP

## 2020-07-27 NOTE — PROGRESS NOTES
This note will not be viewable in [address] Colton Ramírez is a 30 y.o. female and presents with Establish Care      Subjective:  Ms. Sabine Haney comes in today to establish care. She is a new patient to our practice. 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 Patient was being followed at [facility]. Patient has a history of extreme anxiety and depression. She had extreme generalized anxiety attack in 2019. At that time her antidepressants and anxiolytics were adjusted. Currently she is on BuSpar 10 mg twice daily, Wellbutrin  mg daily, Zoloft 100 mg daily. She was taking trazodone in the past but quit taking it. she reports anxiety and depression are both under excellent control. She reports her symptoms are much better and will flared up when she came to know about her diagnosis last year. She was recently diagnosed with scleroderma and saw a rheumatologist at [facility] in 2019. Follows up with a rheumatologist.    Kayla Dense up at [facility] with hemeONC      Past Medical History:   Diagnosis Date    Abnormal Papanicolaou smear of cervix     Acute pyelonephritis 3/22/2018    Anemia     Anxiety     Coagulation disorder ([facility] [address]     thrombocytopenia with pregnancy's    Depression     Disease of blood and blood forming organ     thrombocytopenia    Fetal heart disease 2017    Localized scleroderma      Past Surgical History:   Procedure Laterality Date    BREAST SURGERY PROCEDURE UNLISTED  12/15/2018    DR. LORENZO SANBARIA     DELIVERY ONLY      HX  SECTION  2017    HX GYN  10/2015        HX HEENT      T&A     Allergies   Allergen Reactions    Morphine Hives     Current Outpatient Medications   Medication Sig Dispense Refill    sertraline (ZOLOFT) 100 mg tablet Take 1 Tab by mouth daily. 30 Tab 6    buPROPion XL (WELLBUTRIN XL) 300 mg XL tablet Take 1 Tab by mouth every morning.  30 Tab 6    busPIRone (BUSPAR) 10 mg tablet Take 1 Tab by mouth two (2) times a day. 61 Tab 6     Social History     Socioeconomic History    Marital status:      Spouse name: Joshua Dawson Number of children: Not on file    Years of education: Not on file    Highest education level: Not on file   Tobacco Use    Smoking status: Current Some Day Smoker    Smokeless tobacco: Never Used    Tobacco comment: socially with a drink   Substance and Sexual Activity    Alcohol use: Yes     Comment: weekends    Drug use: No    Sexual activity: Yes     Partners: Male     Birth control/protection: Surgical     Family History   Problem Relation Age of Onset    No Known Problems Mother     No Known Problems Father        Review of Systems  ROS is unremarkable except for ones highlighted in bold.    Constitutional: negative for fevers, chills, anorexia and weight loss  Eyes:   negative for visual disturbance and irritation  ENT:   negative for tinnitus,sore throat,nasal congestion,ear pain,hoarseness  Respiratory:  negative for cough, hemoptysis, dyspnea,wheezing  CV:   negative for chest pain, palpitations, lower extremity edema  GI:   negative for nausea, vomiting, diarrhea, abdominal pain,melena  Endo:               negative for polyuria,polydipsia,polyphagia,heat intolerance  Genitourinary: negative for frequency, dysuria and hematuria  Integumentary: negative for rash and pruritus  Hematologic:  negative for easy bruising and gum/nose bleeding  Musculoskel: negative for myalgias, arthralgias, back pain, muscle weakness, joint pain  Neurological:  negative for headaches, dizziness, vertigo, memory problems and gait   Behavl/Psych: negative for feelings of anxiety, depression, mood changes  ROS otherwise negative     Objective:  Visit Vitals  /74 (BP 1 Location: Left arm, BP Patient Position: Sitting)   Pulse 72   Temp 98.8 °F (37.1 °C)   Resp 14   Ht 5' 7\" (1.702 m)   Wt 177 lb (80.3 kg)   SpO2 97%   BMI 27.72 kg/m²     Physical Exam: General appearance - alert, well appearing, and in no distress  Mental status - alert, oriented to person, place, and time  EYE-CHANDA, EOMI, fundi normal, corneas normal, no foreign bodies  ENT-ENT exam normal, no neck nodes or sinus tenderness  Nose - normal and patent, no erythema, discharge or polyps  Mouth - mucous membranes moist, pharynx normal without lesions  Neck - supple, no significant adenopathy   Chest - clear to auscultation, no wheezes, rales or rhonchi, symmetric air entry   Heart - normal rate, regular rhythm, normal S1, S2, no murmurs, rubs, clicks or gallops   Abdomen - soft, nontender, nondistended, no masses or organomegaly  Lymph- no adenopathy palpable  Ext-peripheral pulses normal, no pedal edema, no clubbing or cyanosis  Skin-Warm and dry. no hyperpigmentation, vitiligo, or suspicious lesions  Neuro -alert, oriented, normal speech, no focal findings or movement disorder noted  Musculoskeletal- FROM, no bony abnormalities, no point tenderness    Lab Review:  Results for orders placed or performed in visit on 09/10/19   HIV 1/2 AG/AB, 4TH GENERATION,W RFLX CONFIRM   Result Value Ref Range    HIV SCREEN 4TH GENERATION WRFX Non Reactive Non Reactive   IMMUNOGLOBULINS, G/A/M, QT. Result Value Ref Range    Immunoglobulin G, Qt. 1,063 700 - 1,600 mg/dL    Immunoglobulin A, Qt. 158 87 - 352 mg/dL    Immunoglobulin M, Qt. 46 26 - 217 mg/dL        Documenation Review:    Assessment/Plan:    Diagnoses and all orders for this visit:    1. Thrombocytopenia (HCC)  -     CBC WITH AUTOMATED DIFF  -     METABOLIC PANEL, COMPREHENSIVE    2. Scleroderma ([facility] [address]  -     CBC WITH AUTOMATED DIFF  -     METABOLIC PANEL, COMPREHENSIVE    3. Moderate episode of recurrent major depressive disorder ([facility] [address]    4. Anxiety        Continue current meds   I will call with lab results and make further recommendations or adjustments if necessary. ICD-10-CM ICD-9-CM    1.  Thrombocytopenia (HCC)  D69.6 287.5 CBC WITH AUTOMATED DIFF      METABOLIC PANEL, COMPREHENSIVE   2. Scleroderma (HCC)  M34.9 710.1 CBC WITH AUTOMATED DIFF      METABOLIC PANEL, COMPREHENSIVE   3. Moderate episode of recurrent major depressive disorder (HCC)  F33.1 296.32    4. Anxiety  F41.9 300.00              I have reviewed with the patient details of the assessment and plan and all questions were answered. Relevent patient education was performed. Verbal and/or written instructions (see AVS) provided. The most recent lab findings were reviewed with the patient. Plan was discussed with patient who verbally expressed understanding. An After Visit Summary was printed and given to the patient.     Tee Mondragon MD

## 2020-07-28 ENCOUNTER — OFFICE VISIT (OUTPATIENT)
Dept: INTERNAL MEDICINE CLINIC | Age: 35
End: 2020-07-28

## 2020-07-28 VITALS
OXYGEN SATURATION: 97 % | DIASTOLIC BLOOD PRESSURE: 74 MMHG | HEART RATE: 72 BPM | HEIGHT: 67 IN | WEIGHT: 177 LBS | TEMPERATURE: 98.8 F | RESPIRATION RATE: 14 BRPM | SYSTOLIC BLOOD PRESSURE: 108 MMHG | BODY MASS INDEX: 27.78 KG/M2

## 2020-07-28 DIAGNOSIS — M34.9 SCLERODERMA (HCC): ICD-10-CM

## 2020-07-28 DIAGNOSIS — D69.6 THROMBOCYTOPENIA (HCC): Primary | ICD-10-CM

## 2020-07-28 DIAGNOSIS — F33.1 MODERATE EPISODE OF RECURRENT MAJOR DEPRESSIVE DISORDER (HCC): ICD-10-CM

## 2020-07-28 DIAGNOSIS — F41.9 ANXIETY: ICD-10-CM

## 2020-07-28 LAB
A-G RATIO,AGRAT: 1.5 RATIO
ALBUMIN SERPL-MCNC: 4.5 G/DL (ref 3.9–5.4)
ALP SERPL-CCNC: 82 U/L (ref 38–126)
ALT SERPL-CCNC: 16 U/L (ref 0–35)
ANION GAP SERPL CALC-SCNC: 9 MMOL/L
AST SERPL W P-5'-P-CCNC: 27 U/L (ref 14–36)
BILIRUB SERPL-MCNC: 1.1 MG/DL (ref 0.2–1.3)
BUN SERPL-MCNC: 13 MG/DL (ref 7–17)
BUN/CREATININE RATIO,BUCR: 22 RATIO
CALCIUM SERPL-MCNC: 9.7 MG/DL (ref 8.4–10.2)
CHLORIDE SERPL-SCNC: 103 MMOL/L (ref 98–107)
CO2 SERPL-SCNC: 29 MMOL/L (ref 22–32)
CREAT SERPL-MCNC: 0.6 MG/DL (ref 0.7–1.2)
ERYTHROCYTE [DISTWIDTH] IN BLOOD BY AUTOMATED COUNT: 11.4 %
GLOBULIN,GLOB: 3
GLUCOSE SERPL-MCNC: 79 MG/DL (ref 65–105)
HCT VFR BLD AUTO: 43.9 % (ref 37–51)
HGB BLD-MCNC: 14.2 G/DL (ref 12–18)
LYMPHOCYTES ABSOLUTE: 2.5 K/UL (ref 0.6–4.1)
LYMPHOCYTES NFR BLD: 33.6 % (ref 10–58.5)
MCH RBC QN AUTO: 32.5 PG (ref 26–32)
MCHC RBC AUTO-ENTMCNC: 32.3 G/DL (ref 30–36)
MCV RBC AUTO: 100.5 FL (ref 80–97)
MONOCYTES ABS-DIF,2141: 0.7 K/UL (ref 0–1.8)
MONOCYTES NFR BLD: 9 % (ref 0.1–24)
NEUTROPHILS # BLD: 57.4 % (ref 37–92)
NEUTROPHILS ABS,2156: 4.2 K/UL (ref 2–7.8)
PLATELET # BLD AUTO: 98 K/UL (ref 140–440)
POTASSIUM SERPL-SCNC: 5 MMOL/L (ref 3.6–5)
PROT SERPL-MCNC: 7.5 G/DL (ref 6.3–8.2)
RBC # BLD AUTO: 4.37 M/UL (ref 4.2–6.3)
SODIUM SERPL-SCNC: 141 MMOL/L (ref 137–145)
WBC # BLD AUTO: 7.3 K/UL (ref 4.1–10.9)

## 2020-07-28 NOTE — PROGRESS NOTES
Your platelets are lower than your baseline. I would recommend to follow-up with your hematologist oncologist at [facility].

## 2020-07-28 NOTE — PATIENT INSTRUCTIONS

## 2020-07-28 NOTE — PROGRESS NOTES
Opal Schuler is a 30 y.o. female presenting for [facility]  . 1. Have you been to the ER, urgent care clinic since your last visit? Hospitalized since your last visit? No    2. Have you seen or consulted any other health care providers outside of the [address] [place] [address] since your last visit? Include any pap smears or colon screening. No    No flowsheet data found. Abuse Screening Questionnaire 2/16/2018   Do you ever feel afraid of your partner? N   Are you in a relationship with someone who physically or mentally threatens you? N   Is it safe for you to go home? Y       3 most recent PHQ Screens 1/10/2020   Little interest or pleasure in doing things Not at all   Feeling down, depressed, irritable, or hopeless Not at all   Total Score PHQ 2 0   Trouble falling or staying asleep, or sleeping too much More than half the days   Feeling tired or having little energy More than half the days   Poor appetite, weight loss, or overeating Not at all   Feeling bad about yourself - or that you are a failure or have let yourself or your family down Not at all   Trouble concentrating on things such as school, work, reading, or watching TV Not at all   Moving or speaking so slowly that other people could have noticed; or the opposite being so fidgety that others notice Not at all   Thoughts of being better off dead, or hurting yourself in some way Not at all   PHQ 9 Score 4   How difficult have these problems made it for you to do your work, take care of your home and get along with others Somewhat difficult       There are no discontinued medications.

## 2020-08-10 RX ORDER — ACETAMINOPHEN AND CODEINE PHOSPHATE 120; 12 MG/5ML; MG/5ML
SOLUTION ORAL
Qty: 90 TAB | Refills: 3 | Status: SHIPPED | OUTPATIENT
Start: 2020-08-10 | End: 2021-07-11

## 2020-10-28 NOTE — PROGRESS NOTES
Mary Saleh is a 30 y.o. 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 Patient comes in today for complete physical evaluation. She reports anxiety and depression under control however she is very concerned about the fact that she is gained weight in the past 1 year despite exercise and diet. She is currently on Zoloft 100 mg p.o. daily, Wellbutrin  mg daily and BuSpar 10 mg daily. She takes trazodone infrequently for sleep. Denies any major stressors in her life. She does report she has decreased libido which is concerning to her. She has not followed up with a psychiatrist and is neither interested in it currently. History of scleroderma, follows up with rheumatologist at [facility] in 2019. Thrombocytopeniafollows up at [facility] with hemeONC. Platelets have been stable. Her Pap smear last year was normal per patient. I do not have records to review. Denies any history of breast, uterine or cervical cancer. No history of colon cancer in family. Smokes 1 to 2 cigarettes occasionally. Past Medical History:   Diagnosis Date    Abnormal Papanicolaou smear of cervix     Acute pyelonephritis 3/22/2018    Anemia     Anxiety     Coagulation disorder ([address]     thrombocytopenia with pregnancy's    Depression     Disease of blood and blood forming organ     thrombocytopenia    Fetal heart disease 2017    Localized scleroderma      Past Surgical History:   Procedure Laterality Date    BREAST SURGERY PROCEDURE UNLISTED  12/15/2018    DR. LORENZO SANABRIA     DELIVERY ONLY      HX  SECTION  2017    HX GYN  10/2015        HX HEENT      T&A     Allergies   Allergen Reactions    Morphine Hives     Current Outpatient Medications   Medication Sig Dispense Refill    sertraline (ZOLOFT) 100 mg tablet Take 1 Tab by mouth daily.  30 Tab 6    buPROPion XL (WELLBUTRIN XL) 300 mg XL tablet Take 1 Tab by mouth every morning. 30 Tab 6    busPIRone (BUSPAR) 10 mg tablet Take 1 Tab by mouth daily. 30 Tab 6    norethindrone (Ortho Micronor) 0.35 mg tab Take 1 tablet every day by oral route. Indications: birth control 80 Tab 3     Social History     Socioeconomic History    Marital status:      Spouse name: Ludivina Bowling Number of children: Not on file    Years of education: Not on file    Highest education level: Not on file   Tobacco Use    Smoking status: Current Some Day Smoker    Smokeless tobacco: Never Used    Tobacco comment: socially with a drink   Substance and Sexual Activity    Alcohol use: Yes     Comment: weekends    Drug use: No    Sexual activity: Yes     Partners: Male     Birth control/protection: Surgical     Family History   Problem Relation Age of Onset    No Known Problems Mother     No Known Problems Father        Review of Systems  ROS is unremarkable except for ones highlighted in bold.    Constitutional: negative for fevers, chills, anorexia and weight loss  Eyes:   negative for visual disturbance and irritation  ENT:   negative for tinnitus,sore throat,nasal congestion,ear pain,hoarseness  Respiratory:  negative for cough, hemoptysis, dyspnea,wheezing  CV:   negative for chest pain, palpitations, lower extremity edema  GI:   negative for nausea, vomiting, diarrhea, abdominal pain,melena  Endo:               negative for polyuria,polydipsia,polyphagia,heat intolerance  Genitourinary: negative for frequency, dysuria and hematuria  Integumentary: negative for rash and pruritus  Hematologic:  negative for easy bruising and gum/nose bleeding  Musculoskel: negative for myalgias, arthralgias, back pain, muscle weakness, joint pain  Neurological:  negative for headaches, dizziness, vertigo, memory problems and gait   Behavl/Psych: negative for feelings of anxiety, depression, mood changes, decreased sexual drive  ROS otherwise negative     Objective:  Visit Vitals  /64 (BP 1 Location: Left arm, BP Patient Position: Sitting)   Pulse 95   Temp 98.4 °F (36.9 °C)   Resp 14   Ht 5' 7\" (1.702 m)   Wt 185 lb (83.9 kg)   SpO2 97%   BMI 28.98 kg/m²     Physical Exam:   General appearance - alert, well appearing, and in no distress  Mental status - alert, oriented to person, place, and time  EYE-CHANDA, EOMI, fundi normal, corneas normal, no foreign bodies  ENT-ENT exam normal, no neck nodes or sinus tenderness  Nose - normal and patent, no erythema, discharge or polyps  Mouth - mucous membranes moist, pharynx normal without lesions  Neck - supple, no significant adenopathy   Chest - clear to auscultation, no wheezes, rales or rhonchi, symmetric air entry   Heart - normal rate, regular rhythm, normal S1, S2, no murmurs, rubs, clicks or gallops   Abdomen - soft, nontender, nondistended, no masses or organomegaly  Lymph- no adenopathy palpable  Ext-peripheral pulses normal, no pedal edema, no clubbing or cyanosis  Skin-Warm and dry.  no hyperpigmentation, vitiligo, or suspicious lesions  Neuro -alert, oriented, normal speech, no focal findings or movement disorder noted  Musculoskeletal- FROM, no bony abnormalities, no point tenderness    Lab Review:  Results for orders placed or performed in visit on 07/28/20   CBC WITH AUTOMATED DIFF   Result Value Ref Range    WBC 7.3 4.1 - 10.9 K/uL    RBC 4.37 4.20 - 6.30 M/uL    HGB 14.2 12.0 - 18.0 g/dL    HCT 43.9 37.0 - 51.0 %    MCH 32.5 (H) 26.0 - 32.0 pg    MCHC 32.3 30.0 - 36.0 g/dL    .5 (H) 80.0 - 97.0 fL    RDW 11.4 %    PLATELET 94.3 (L) 160.2 - 440.0 K/uL    Neutrophils abs 4.2 2.0 - 7.8 K/uL    Neutrophils 57.4 37.0 - 92.0 %    Monocytes abs-DIF 0.7 0.0 - 1.8 K/uL    MONOCYTES 9.0 0.1 - 24.0 %    Lymphocytes Absolute 2.5 0.6 - 4.1 K/uL    LYMPHOCYTES 33.6 10.0 - 57.9 %   METABOLIC PANEL, COMPREHENSIVE   Result Value Ref Range    Glucose 79 65 - 105 mg/dL    BUN 13.0 7.0 - 17.0 mg/dL    Creatinine 0.6 (L) 0.7 - 1.2 mg/dL    Sodium 141 137 - 145 mmol/L    Potassium 5.0 3.6 - 5.0 mmol/L    Chloride 103 98 - 107 mmol/L    CO2 29.0 22.0 - 32.0 mmol/L    Calcium 9.7 8.4 - 10.2 mg/dl    Protein, total 7.5 6.3 - 8.2 g/dL    Albumin 4.5 3.9 - 5.4 g/dL    ALT (SGPT) 16 0 - 35 U/L    AST (SGOT) 27.0 14.0 - 36.0 U/L    Alk. phosphatase 82 38 - 126 U/L    Bilirubin, total 1.1 0.2 - 1.3 mg/dL    BUN/Creatinine ratio 22 Ratio    GFR est AA >60 mL/min/1.73m2    GFR est non-AA >60 mL/min/1.73m2    Globulin 3.00     A-G Ratio 1.5 Ratio    Anion gap 9 mmol/L        Documenation Review:    Assessment/Plan:    Diagnoses and all orders for this visit:    1. Encounter for annual physical exam    2. Anxiety  -     sertraline (ZOLOFT) 100 mg tablet; Take 1 Tab by mouth daily. -     buPROPion XL (WELLBUTRIN XL) 300 mg XL tablet; Take 1 Tab by mouth every morning.  -     busPIRone (BUSPAR) 10 mg tablet; Take 1 Tab by mouth daily. 3. Moderate episode of recurrent major depressive disorder (HCC)  -     sertraline (ZOLOFT) 100 mg tablet; Take 1 Tab by mouth daily. -     buPROPion XL (WELLBUTRIN XL) 300 mg XL tablet; Take 1 Tab by mouth every morning. 4. Scleroderma ([address]    5. Thrombocytopenia (HCC)  -     CBC W/O DIFF  -     METABOLIC PANEL, COMPREHENSIVE    6. Screening for lipid disorders  -     LIPID PANEL    7. Screening for thyroid disorder  -     TSH 3RD GENERATION    8. Screening for diabetes mellitus  -     HEMOGLOBIN A1C W/O EAG    9. Vitamin D deficiency  -     VITAMIN D, 25 HYDROXY    10. Encounter for gynecological examination  -     REFERRAL TO OBSTETRICS AND GYNECOLOGY    11. Moderate tobacco dependence        Preventive services:  Test      Status  Mammogram    not applicable  Pap smear    normal.  Repeat in 2 years.   Colon cancer screening  N/A  Low-dose lung CT   N/A  Bone density/DEXA   N/A  Diabetes (glucose/HbA1c)  order placed  Cardiovascular (lipids)  order placed  Hepatitis C    not applicable  Visual acuity    current      Discussed with patient that she is on SSRI which might be contributing to decrease libido and weight gain. She is on Wellbutrin which as a contractor for effect but still is experiencing symptoms. Given an option to see a psychiatrist however she would like to defer it. Also gave her an option that we could cross-taper it and start SNRI/Effexor however she does not want to change any medications at this point. Continue current meds  I will call with lab results and make further recommendations or adjustments if necessary. Discussed lifestyle modifications including Na restriction, low carb/fat diet, weight reduction and exercise (at least a walking program). Significant time-3mins was spent with the patient and counseling him regarding tobacco cessation. Nicotine patch was offered. Patient voiced understanding. ICD-10-CM ICD-9-CM    1. Encounter for annual physical exam  Z00.00 V70.0    2. Anxiety  F41.9 300.00 sertraline (ZOLOFT) 100 mg tablet      buPROPion XL (WELLBUTRIN XL) 300 mg XL tablet      busPIRone (BUSPAR) 10 mg tablet   3. Moderate episode of recurrent major depressive disorder (HCC)  F33.1 296.32 sertraline (ZOLOFT) 100 mg tablet      buPROPion XL (WELLBUTRIN XL) 300 mg XL tablet   4. Scleroderma ([address]  M34.9 710.1    5. Thrombocytopenia (HCC)  D69.6 287.5 CBC W/O DIFF      METABOLIC PANEL, COMPREHENSIVE   6. Screening for lipid disorders  Z13.220 V77.91 LIPID PANEL   7. Screening for thyroid disorder  Z13.29 V77.0 TSH 3RD GENERATION   8. Screening for diabetes mellitus  Z13.1 V77.1 HEMOGLOBIN A1C W/O EAG   9. Vitamin D deficiency  E55.9 268.9 VITAMIN D, 25 HYDROXY   10. Encounter for gynecological examination  Z01.419 V72.31 REFERRAL TO OBSTETRICS AND GYNECOLOGY   11. Moderate tobacco dependence  F17.200 305.1          Follow-up and Dispositions    · Return in about 6 months (around 4/29/2021) for follow up.          I have reviewed with the patient details of the assessment and plan and all questions were answered. Relevent patient education was performed. Verbal and/or written instructions (see AVS) provided. The most recent lab findings were reviewed with the patient. Plan was discussed with patient who verbally expressed understanding. An After Visit Summary was printed and given to the patient.     Linda Rodriguez MD

## 2020-10-29 ENCOUNTER — OFFICE VISIT (OUTPATIENT)
Dept: INTERNAL MEDICINE CLINIC | Age: 35
End: 2020-10-29
Payer: COMMERCIAL

## 2020-10-29 VITALS
SYSTOLIC BLOOD PRESSURE: 100 MMHG | DIASTOLIC BLOOD PRESSURE: 64 MMHG | OXYGEN SATURATION: 97 % | HEIGHT: 67 IN | RESPIRATION RATE: 14 BRPM | HEART RATE: 95 BPM | WEIGHT: 185 LBS | TEMPERATURE: 98.4 F | BODY MASS INDEX: 29.03 KG/M2

## 2020-10-29 DIAGNOSIS — Z01.419 ENCOUNTER FOR GYNECOLOGICAL EXAMINATION: ICD-10-CM

## 2020-10-29 DIAGNOSIS — E55.9 VITAMIN D DEFICIENCY: ICD-10-CM

## 2020-10-29 DIAGNOSIS — Z13.220 SCREENING FOR LIPID DISORDERS: ICD-10-CM

## 2020-10-29 DIAGNOSIS — Z13.1 SCREENING FOR DIABETES MELLITUS: ICD-10-CM

## 2020-10-29 DIAGNOSIS — F17.200 MODERATE TOBACCO DEPENDENCE: ICD-10-CM

## 2020-10-29 DIAGNOSIS — F33.1 MODERATE EPISODE OF RECURRENT MAJOR DEPRESSIVE DISORDER (HCC): ICD-10-CM

## 2020-10-29 DIAGNOSIS — Z13.29 SCREENING FOR THYROID DISORDER: ICD-10-CM

## 2020-10-29 DIAGNOSIS — F41.9 ANXIETY: ICD-10-CM

## 2020-10-29 DIAGNOSIS — M34.9 SCLERODERMA (HCC): ICD-10-CM

## 2020-10-29 DIAGNOSIS — Z00.00 ENCOUNTER FOR ANNUAL PHYSICAL EXAM: Primary | ICD-10-CM

## 2020-10-29 DIAGNOSIS — D69.6 THROMBOCYTOPENIA (HCC): ICD-10-CM

## 2020-10-29 LAB
25(OH)D3 SERPL-MCNC: 67 NG/ML (ref 30–96)
A-G RATIO,AGRAT: 1.6 RATIO
ALBUMIN SERPL-MCNC: 4.5 G/DL (ref 3.9–5.4)
ALP SERPL-CCNC: 63 U/L (ref 38–126)
ALT SERPL-CCNC: 17 U/L (ref 0–35)
ANION GAP SERPL CALC-SCNC: 9 MMOL/L
AST SERPL W P-5'-P-CCNC: 28 U/L (ref 14–36)
BILIRUB SERPL-MCNC: 1.1 MG/DL (ref 0.2–1.3)
BUN SERPL-MCNC: 13 MG/DL (ref 7–17)
BUN/CREATININE RATIO,BUCR: 13 RATIO
CALCIUM SERPL-MCNC: 9.6 MG/DL (ref 8.4–10.2)
CHLORIDE SERPL-SCNC: 103 MMOL/L (ref 98–107)
CHOL/HDL RATIO,CHHD: 3 RATIO (ref 0–4)
CHOLEST SERPL-MCNC: 159 MG/DL (ref 0–200)
CO2 SERPL-SCNC: 33 MMOL/L (ref 22–32)
CREAT SERPL-MCNC: 1 MG/DL (ref 0.7–1.2)
ERYTHROCYTE [DISTWIDTH] IN BLOOD BY AUTOMATED COUNT: 12.4 %
GLOBULIN,GLOB: 2.9
GLUCOSE SERPL-MCNC: 52 MG/DL (ref 65–105)
HBA1C MFR BLD HPLC: 4.8 % (ref 4–5.7)
HCT VFR BLD AUTO: 43.6 % (ref 37–51)
HDLC SERPL-MCNC: 60 MG/DL (ref 35–130)
HGB BLD-MCNC: 14.3 G/DL (ref 12–18)
LDL/HDL RATIO,LDHD: 1 RATIO
LDLC SERPL CALC-MCNC: 69 MG/DL (ref 0–130)
MCH RBC QN AUTO: 33.3 PG (ref 26–32)
MCHC RBC AUTO-ENTMCNC: 32.8 G/DL (ref 30–36)
MCV RBC AUTO: 101.7 FL (ref 80–97)
PLATELET # BLD AUTO: 126 K/UL (ref 140–440)
POTASSIUM SERPL-SCNC: 4.3 MMOL/L (ref 3.6–5)
PROT SERPL-MCNC: 7.4 G/DL (ref 6.3–8.2)
RBC # BLD AUTO: 4.29 M/UL (ref 4.2–6.3)
SODIUM SERPL-SCNC: 145 MMOL/L (ref 137–145)
TRIGL SERPL-MCNC: 152 MG/DL (ref 0–200)
TSH SERPL DL<=0.05 MIU/L-ACNC: 1.18 UIU/ML (ref 0.34–5.6)
VLDLC SERPL CALC-MCNC: 30 MG/DL
WBC # BLD AUTO: 7.1 K/UL (ref 4.1–10.9)

## 2020-10-29 PROCEDURE — 99214 OFFICE O/P EST MOD 30 MIN: CPT | Performed by: INTERNAL MEDICINE

## 2020-10-29 PROCEDURE — 80061 LIPID PANEL: CPT | Performed by: INTERNAL MEDICINE

## 2020-10-29 PROCEDURE — 83036 HEMOGLOBIN GLYCOSYLATED A1C: CPT | Performed by: INTERNAL MEDICINE

## 2020-10-29 PROCEDURE — 80053 COMPREHEN METABOLIC PANEL: CPT | Performed by: INTERNAL MEDICINE

## 2020-10-29 PROCEDURE — 82306 VITAMIN D 25 HYDROXY: CPT | Performed by: INTERNAL MEDICINE

## 2020-10-29 PROCEDURE — 99395 PREV VISIT EST AGE 18-39: CPT | Performed by: INTERNAL MEDICINE

## 2020-10-29 PROCEDURE — 84443 ASSAY THYROID STIM HORMONE: CPT | Performed by: INTERNAL MEDICINE

## 2020-10-29 PROCEDURE — 85027 COMPLETE CBC AUTOMATED: CPT | Performed by: INTERNAL MEDICINE

## 2020-10-29 RX ORDER — SERTRALINE HYDROCHLORIDE 100 MG/1
100 TABLET, FILM COATED ORAL DAILY
Qty: 30 TAB | Refills: 6 | Status: SHIPPED | OUTPATIENT
Start: 2020-10-29

## 2020-10-29 RX ORDER — BUPROPION HYDROCHLORIDE 300 MG/1
300 TABLET ORAL
Qty: 30 TAB | Refills: 6 | Status: SHIPPED | OUTPATIENT
Start: 2020-10-29

## 2020-10-29 RX ORDER — BUSPIRONE HYDROCHLORIDE 10 MG/1
10 TABLET ORAL DAILY
Qty: 30 TAB | Refills: 6 | Status: SHIPPED | OUTPATIENT
Start: 2020-10-29 | End: 2020-12-11 | Stop reason: SDUPTHER

## 2020-10-29 NOTE — PROGRESS NOTES
Jesus Ragsdale is a 30 y.o. female presenting for Complete Physical  .     1. Have you been to the ER, urgent care clinic since your last visit? Hospitalized since your last visit? No    2. Have you seen or consulted any other health care providers outside of the [address] [place] [address] since your last visit? Include any pap smears or colon screening. No    No flowsheet data found. Abuse Screening Questionnaire 2/16/2018   Do you ever feel afraid of your partner? N   Are you in a relationship with someone who physically or mentally threatens you? N   Is it safe for you to go home? Y       3 most recent PHQ Screens 1/10/2020   Little interest or pleasure in doing things Not at all   Feeling down, depressed, irritable, or hopeless Not at all   Total Score PHQ 2 0   Trouble falling or staying asleep, or sleeping too much More than half the days   Feeling tired or having little energy More than half the days   Poor appetite, weight loss, or overeating Not at all   Feeling bad about yourself - or that you are a failure or have let yourself or your family down Not at all   Trouble concentrating on things such as school, work, reading, or watching TV Not at all   Moving or speaking so slowly that other people could have noticed; or the opposite being so fidgety that others notice Not at all   Thoughts of being better off dead, or hurting yourself in some way Not at all   PHQ 9 Score 4   How difficult have these problems made it for you to do your work, take care of your home and get along with others Somewhat difficult       There are no discontinued medications.

## 2020-10-29 NOTE — PATIENT INSTRUCTIONS
Starting a Weight Loss Plan: Care Instructions Your Care Instructions If you are thinking about losing weight, it can be hard to know where to start. Your doctor can help you set up a weight loss plan that best meets your needs. You may want to take a class on nutrition or exercise, or join a weight loss support group. If you have questions about how to make changes to your eating or exercise habits, ask your doctor about seeing a registered dietitian or an exercise specialist. 
It can be a big challenge to lose weight. But you do not have to make huge changes at once. Make small changes, and stick with them. When those changes become habit, add a few more changes. If you do not think you are ready to make changes right now, try to pick a date in the future. Make an appointment to see your doctor to discuss whether the time is right for you to start a plan. Follow-up care is a key part of your treatment and safety. Be sure to make and go to all appointments, and call your doctor if you are having problems. It's also a good idea to know your test results and keep a list of the medicines you take. How can you care for yourself at home? · Set realistic goals. Many people expect to lose much more weight than is likely. A weight loss of 5% to 10% of your body weight may be enough to improve your health. · Get family and friends involved to provide support. Talk to them about why you are trying to lose weight, and ask them to help. They can help by participating in exercise and having meals with you, even if they may be eating something different. · Find what works best for you. If you do not have time or do not like to cook, a program that offers meal replacement bars or shakes may be better for you. Or if you like to prepare meals, finding a plan that includes daily menus and recipes may be best. 
· Ask your doctor about other health professionals who can help you achieve your weight loss goals. ? A dietitian can help you make healthy changes in your diet. ? An exercise specialist or  can help you develop a safe and effective exercise program. 
? A counselor or psychiatrist can help you cope with issues such as depression, anxiety, or family problems that can make it hard to focus on weight loss. · Consider joining a support group for people who are trying to lose weight. Your doctor can suggest groups in your area. Where can you learn more? Go to http://www.gray.com/ Enter H633 in the search box to learn more about \"Starting a Weight Loss Plan: Care Instructions. \" Current as of: December 11, 2019               Content Version: 12.6 © 9481-6781 [employer]. Care instructions adapted under license by [employer] (which disclaims liability or warranty for this information). If you have questions about a medical condition or this instruction, always ask your healthcare professional. [address] any warranty or liability for your use of this information.

## 2020-12-11 DIAGNOSIS — F41.9 ANXIETY: ICD-10-CM

## 2020-12-11 RX ORDER — BUSPIRONE HYDROCHLORIDE 10 MG/1
10 TABLET ORAL DAILY
Qty: 30 TAB | Refills: 6 | Status: SHIPPED | OUTPATIENT
Start: 2020-12-11 | End: 2021-02-08 | Stop reason: SDUPTHER

## 2020-12-11 NOTE — TELEPHONE ENCOUNTER
Last Refill: 10/29/2020  Last Visit: 10/29/2020   Next Visit: 5/3/2021    Requested Prescriptions     Pending Prescriptions Disp Refills    busPIRone (BUSPAR) 10 mg tablet 30 Tab 6     Sig: Take 1 Tab by mouth daily.

## 2021-02-08 DIAGNOSIS — F41.9 ANXIETY: ICD-10-CM

## 2021-02-08 RX ORDER — BUSPIRONE HYDROCHLORIDE 10 MG/1
10 TABLET ORAL DAILY
Qty: 30 TAB | Refills: 6 | Status: SHIPPED | OUTPATIENT
Start: 2021-02-08 | End: 2021-04-05 | Stop reason: SDUPTHER

## 2021-02-08 NOTE — TELEPHONE ENCOUNTER
Last Refill: 12/11/2020  Last Visit: 10/29/2020   Next Visit: 5/3/2021    Requested Prescriptions     Pending Prescriptions Disp Refills   • busPIRone (BUSPAR) 10 mg tablet 30 Tab 6     Sig: Take 1 Tab by mouth daily.

## 2021-04-05 DIAGNOSIS — F41.9 ANXIETY: ICD-10-CM

## 2021-04-05 NOTE — TELEPHONE ENCOUNTER
Last Refill: 2/8/2021  Last Visit: 10/29/2020   Next Visit: 5/3/2021    Requested Prescriptions     Pending Prescriptions Disp Refills    busPIRone (BUSPAR) 10 mg tablet 90 Tab 2     Sig: Take 1 Tab by mouth daily.

## 2021-04-06 RX ORDER — BUSPIRONE HYDROCHLORIDE 10 MG/1
10 TABLET ORAL DAILY
Qty: 90 TAB | Refills: 2 | Status: SHIPPED | OUTPATIENT
Start: 2021-04-06

## 2021-04-14 DIAGNOSIS — N63.20 LEFT BREAST MASS: Primary | ICD-10-CM

## 2021-04-16 DIAGNOSIS — N63.0 BREAST LUMP: Primary | ICD-10-CM

## 2021-04-26 ENCOUNTER — OFFICE VISIT (OUTPATIENT)
Dept: SURGERY | Age: 36
End: 2021-04-26
Payer: COMMERCIAL

## 2021-04-26 VITALS
HEART RATE: 64 BPM | TEMPERATURE: 97.3 F | BODY MASS INDEX: 29.66 KG/M2 | DIASTOLIC BLOOD PRESSURE: 67 MMHG | HEIGHT: 67 IN | OXYGEN SATURATION: 94 % | SYSTOLIC BLOOD PRESSURE: 99 MMHG | WEIGHT: 189 LBS | RESPIRATION RATE: 16 BRPM

## 2021-04-26 DIAGNOSIS — N63.23 MASS OF LOWER OUTER QUADRANT OF LEFT BREAST: Primary | ICD-10-CM

## 2021-04-26 DIAGNOSIS — M34.9 SCLERODERMA (HCC): ICD-10-CM

## 2021-04-26 DIAGNOSIS — D69.6 THROMBOCYTOPENIA (HCC): ICD-10-CM

## 2021-04-26 PROCEDURE — 99243 OFF/OP CNSLTJ NEW/EST LOW 30: CPT | Performed by: SURGERY

## 2021-04-26 NOTE — PROGRESS NOTES
Identified pt with two pt identifiers(name and ). Reviewed record in preparation for visit and have obtained necessary documentation. All patient medications has been reviewed. Chief Complaint   Patient presents with    Breast Problem     Seen at the request of Dr. Lynette Clark for eval Left breast lump        Health Maintenance Due   Topic    Pneumococcal 0-64 years (1 of 1 - PPSV23)    COVID-19 Vaccine (1)       Vitals:    21 0835   BP: 99/67   Pulse: 64   Resp: 16   Temp: 97.3 °F (36.3 °C)   TempSrc: Temporal   SpO2: 94%   Weight: 85.7 kg (189 lb)   Height: 5' 7\" (1.702 m)   PainSc:   0 - No pain       4. Have you been to the ER, urgent care clinic since your last visit? Hospitalized since your last visit? No    5. Have you seen or consulted any other health care providers outside of the [address] [place] [address] since your last visit? Include any pap smears or colon screening. No      Patient is accompanied by self I have received verbal consent from Linwood Hager to discuss any/all medical information while they are present in the room.

## 2021-04-26 NOTE — LETTER
4/26/2021 Patient: Rodrick Sanon YOB: 1985 Date of Visit: 4/26/2021 Fermin Adams MD 
[facility] [address] [facility] [address] [address] Dear Fermin Adams MD, Thank you for referring Ms. Ritesh Christy to  [facility] for evaluation. My notes for this consultation are attached. If you have questions, please do not hesitate to call me. I look forward to following your patient along with you.  
 
 
Sincerely, 
 
Jing Schwartz MD

## 2021-04-26 NOTE — PROGRESS NOTES
HISTORY OF PRESENT ILLNESS  Leisa Nieto is a 37 y.o. female who comes in for consultation by Julianne Romero MD for a breast lump  HPI  She has noted a left breast lump. It started a few weeks ago. It is slightly tender. She denies trauma. She has a hx of scleroderma and has had prior a right breast scleroderma nodule. She has also had it on her back and leg. She denies other breast changes, nipple changes or drainage. She had menarche at 10 first of two pregnancies at 31 and her LMP was 3/28. She denies family hx breast or ovarian cancer. Past Medical History:   Diagnosis Date    Abnormal Papanicolaou smear of cervix     Acute pyelonephritis 3/22/2018    Anemia     Anxiety     Coagulation disorder ([address]     thrombocytopenia with pregnancy's    Depression     Disease of blood and blood forming organ     thrombocytopenia    Fetal heart disease 2017    Localized scleroderma      Past Surgical History:   Procedure Laterality Date    HX  SECTION  2017    HX GYN  10/2015        HX HEENT      T&A    [facility] BREAST SURGERY PROCEDURE UNLISTED  12/15/2018    DR. LORENZO SANABRIA    [facility]  DELIVERY ONLY       Family History   Problem Relation Age of Onset    No Known Problems Mother     No Known Problems Father      Social History     Tobacco Use    Smoking status: Current Some Day Smoker    Smokeless tobacco: Never Used    Tobacco comment: socially with a drink   Substance Use Topics    Alcohol use: Yes     Comment: weekends    Drug use: No     Current Outpatient Medications   Medication Sig    sertraline (ZOLOFT) 100 mg tablet Take 1 Tab by mouth daily. (Patient taking differently: Take 50 mg by mouth daily.)    buPROPion XL (WELLBUTRIN XL) 300 mg XL tablet Take 1 Tab by mouth every morning. (Patient taking differently: Take 300 mg by mouth every morning.  Patient is taking 100mg)    norethindrone (Ortho Micronor) 0.35 mg tab Take 1 tablet every day by oral route.  Indications: birth control    busPIRone (BUSPAR) 10 mg tablet Take 1 Tab by mouth daily. No current facility-administered medications for this visit. Allergies   Allergen Reactions    Morphine Hives       Review of Systems   Constitutional: Negative for chills, diaphoresis, fever and weight loss. HENT: Negative for sore throat. Eyes: Negative for blurred vision and discharge. Respiratory: Negative for cough, shortness of breath and wheezing. Cardiovascular: Negative for chest pain, palpitations, orthopnea, claudication and leg swelling. Gastrointestinal: Negative for abdominal pain, constipation, diarrhea, heartburn, melena, nausea and vomiting. Genitourinary: Negative for dysuria, flank pain, frequency and hematuria. Musculoskeletal: Negative for back pain, joint pain, myalgias and neck pain. Skin: Negative for rash. Neurological: Negative for dizziness, speech change, focal weakness, seizures, loss of consciousness, weakness and headaches. Endo/Heme/Allergies: Bruises/bleeds easily. Psychiatric/Behavioral: Negative for depression and memory loss. The patient is nervous/anxious. Visit Vitals  BP 99/67 (BP 1 Location: Left upper arm, BP Patient Position: Sitting, BP Cuff Size: Adult)   Pulse 64   Temp 97.3 °F (36.3 °C) (Temporal)   Resp 16   Ht 5' 7\" (1.702 m)   Wt 85.7 kg (189 lb)   SpO2 94%   BMI 29.60 kg/m²       Physical Exam  Exam conducted with a chaperone present. Constitutional:       General: She is not in acute distress. Appearance: She is well-developed. She is not diaphoretic. HENT:      Head: Normocephalic and atraumatic. Mouth/Throat:      Pharynx: No oropharyngeal exudate. Eyes:      General: No scleral icterus. Conjunctiva/sclera: Conjunctivae normal.      Pupils: Pupils are equal, round, and reactive to light. Neck:      Musculoskeletal: Normal range of motion and neck supple. Thyroid: No thyromegaly. Vascular: No JVD. Trachea: No tracheal deviation. Cardiovascular:      Rate and Rhythm: Normal rate and regular rhythm. Heart sounds: No murmur. No friction rub. No gallop. Pulmonary:      Effort: Pulmonary effort is normal. No respiratory distress. Breath sounds: Normal breath sounds. No wheezing or rales. Chest:      Breasts: Breasts are symmetrical.         Right: No inverted nipple, mass, nipple discharge, skin change or tenderness. Left: Mass (8 mm), skin change and tenderness present. No inverted nipple or nipple discharge. Abdominal:      General: Bowel sounds are normal. There is no distension. Palpations: Abdomen is soft. There is no mass. Tenderness: There is no abdominal tenderness. There is no guarding or rebound. Musculoskeletal: Normal range of motion. Lymphadenopathy:      Cervical: No cervical adenopathy. Upper Body:      Right upper body: No supraclavicular or axillary adenopathy. Left upper body: No supraclavicular or axillary adenopathy. Skin:     General: Skin is warm and dry. Coloration: Skin is not pale. Findings: No erythema or rash. Neurological:      Mental Status: She is alert and oriented to person, place, and time. Cranial Nerves: No cranial nerve deficit. Psychiatric:         Behavior: Behavior normal.         Thought Content: Thought content normal.         Judgment: Judgment normal.         ASSESSMENT and PLAN  1. Left breast mass 0500. This is likely scleroderma but need to r/o other process. I discussed observation vs excision. She reports that the last lesion grew rapidly. Risks of excision include, but are not limited to, bleeding, infection, recurrence, poor healing/cosmesis. 2.  Scleroderma. Multiple sites. Followed at [facility]  3. Thrombocytopenia. Followed  4. Depression.  On several agents      She desires excision of a left breast mass under local anesthesia in the office        Tien Starkey Vicente Sellers MD FACS

## 2021-04-30 ENCOUNTER — OFFICE VISIT (OUTPATIENT)
Dept: SURGERY | Age: 36
End: 2021-04-30
Payer: COMMERCIAL

## 2021-04-30 ENCOUNTER — HOSPITAL ENCOUNTER (OUTPATIENT)
Dept: LAB | Age: 36
Discharge: HOME | End: 2021-04-30

## 2021-04-30 VITALS
RESPIRATION RATE: 14 BRPM | BODY MASS INDEX: 28.49 KG/M2 | TEMPERATURE: 97.6 F | WEIGHT: 181.5 LBS | HEART RATE: 80 BPM | DIASTOLIC BLOOD PRESSURE: 83 MMHG | OXYGEN SATURATION: 99 % | HEIGHT: 67 IN | SYSTOLIC BLOOD PRESSURE: 121 MMHG

## 2021-04-30 DIAGNOSIS — N63.23 MASS OF LOWER OUTER QUADRANT OF LEFT BREAST: Primary | ICD-10-CM

## 2021-04-30 PROCEDURE — 19120 REMOVAL OF BREAST LESION: CPT | Performed by: SURGERY

## 2021-04-30 NOTE — PROGRESS NOTES
Identified pt with two pt identifiers(name and ). Reviewed record in preparation for visit and have obtained necessary documentation. All patient medications has been reviewed. Chief Complaint   Patient presents with    Surgery     excision of Left breast mass        Health Maintenance Due   Topic    Pneumococcal 0-64 years (1 of 1 - PPSV23)    COVID-19 Vaccine (1)       Vitals:    21 1401   BP: 121/83   Pulse: 80   Resp: 14   Temp: 97.6 °F (36.4 °C)   TempSrc: Temporal   SpO2: 99%   Weight: 82.3 kg (181 lb 8 oz)   Height: 5' 7\" (1.702 m)   PainSc:   0 - No pain       4. Have you been to the ER, urgent care clinic since your last visit? Hospitalized since your last visit? No    5. Have you seen or consulted any other health care providers outside of the [address] [place] [address] since your last visit? Include any pap smears or colon screening. No      Patient is accompanied by self I have received verbal consent from Collin Schumacher to discuss any/all medical information while they are present in the room.

## 2021-04-30 NOTE — PROGRESS NOTES
Procedure Note    Pre OP Dx:  Left breast mass  Post OP Dx Left breast mass  Procedure Excision of left breast mass  Surgeon Nuno  Anesthesia 1% Lidocaine with epi  10 ml  EBL   Minimal  Pathology Left breast mass    Procedure  After informed consent and time out, the lower outer quadrant of the left breast was prepped with chlorhexidine and draped sterilely. Local anesthetic was injected into the skin and subcutaneous tissues around the mass. A 2 cm elliptical skin incision was made over the mass and the mass was sharply excised. interrupted 4-0 vicryl was used to close the deep layers and deep dermis and a running 4-0 vicryl was utilized as a subcuticular closure. A sterile dressing was applied. The patient tolerated the procedure well.     Rachele Moulton MD FACS

## 2021-05-06 ENCOUNTER — TELEPHONE (OUTPATIENT)
Dept: SURGERY | Age: 36
End: 2021-05-06

## 2021-05-06 NOTE — TELEPHONE ENCOUNTER
Patient notified of pathology result by phone.  Copy of pathology mailed out to patient, per patient request.

## 2021-05-06 NOTE — TELEPHONE ENCOUNTER
----- Message from Glen Christianson MD sent at 5/5/2021 11:45 AM EDT -----  Regarding: path  Please call her and let her know that the pathology was consistent with her scleroderma.   No cancer    Glen Christianson MD FACS  ----- Message -----  From: Claude Oh In [facility]3 Results  Sent: 5/4/2021   2:12 PM EDT  To: Glen Christianson MD

## 2021-07-11 DIAGNOSIS — F41.9 ANXIETY: ICD-10-CM

## 2021-07-11 DIAGNOSIS — F33.1 MODERATE EPISODE OF RECURRENT MAJOR DEPRESSIVE DISORDER (HCC): ICD-10-CM

## 2021-07-11 RX ORDER — ACETAMINOPHEN AND CODEINE PHOSPHATE 120; 12 MG/5ML; MG/5ML
SOLUTION ORAL
Qty: 84 TABLET | Refills: 3 | Status: SHIPPED | OUTPATIENT
Start: 2021-07-11

## 2021-07-12 RX ORDER — BUPROPION HYDROCHLORIDE 300 MG/1
300 TABLET ORAL
Qty: 30 TABLET | Refills: 6 | OUTPATIENT
Start: 2021-07-12

## 2021-07-12 RX ORDER — SERTRALINE HYDROCHLORIDE 100 MG/1
100 TABLET, FILM COATED ORAL DAILY
Qty: 30 TABLET | Refills: 6 | OUTPATIENT
Start: 2021-07-12

## 2021-07-12 NOTE — TELEPHONE ENCOUNTER
Last Refill: 10/29/2020  Last visit:5/3/2021    NOV: Visit date not found    Requested Prescriptions     Pending Prescriptions Disp Refills    sertraline (ZOLOFT) 100 mg tablet 30 Tablet 6     Sig: Take 1 Tablet by mouth daily.  buPROPion XL (WELLBUTRIN XL) 300 mg XL tablet 30 Tablet 6     Sig: Take 1 Tablet by mouth every morning.

## 2022-04-17 ENCOUNTER — APPOINTMENT (OUTPATIENT)
Dept: CT IMAGING | Age: 37
End: 2022-04-17
Attending: EMERGENCY MEDICINE
Payer: COMMERCIAL

## 2022-04-17 ENCOUNTER — HOSPITAL ENCOUNTER (EMERGENCY)
Age: 37
Discharge: HOME | End: 2022-04-17
Attending: EMERGENCY MEDICINE
Payer: COMMERCIAL

## 2022-04-17 VITALS
DIASTOLIC BLOOD PRESSURE: 94 MMHG | BODY MASS INDEX: 29.03 KG/M2 | OXYGEN SATURATION: 100 % | WEIGHT: 185 LBS | HEIGHT: 67 IN | TEMPERATURE: 97.6 F | RESPIRATION RATE: 14 BRPM | SYSTOLIC BLOOD PRESSURE: 111 MMHG | HEART RATE: 93 BPM

## 2022-04-17 DIAGNOSIS — R10.9 ACUTE ABDOMINAL PAIN: Primary | ICD-10-CM

## 2022-04-17 DIAGNOSIS — R19.7 DIARRHEA, UNSPECIFIED TYPE: ICD-10-CM

## 2022-04-17 DIAGNOSIS — R11.10 ACUTE VOMITING: ICD-10-CM

## 2022-04-17 DIAGNOSIS — K21.9 GASTROESOPHAGEAL REFLUX DISEASE WITHOUT ESOPHAGITIS: ICD-10-CM

## 2022-04-17 LAB
ALBUMIN SERPL-MCNC: 3.7 G/DL (ref 3.5–5)
ALBUMIN/GLOB SERPL: 1.1 {RATIO} (ref 1.1–2.2)
ALP SERPL-CCNC: 63 U/L (ref 45–117)
ALT SERPL-CCNC: 56 U/L (ref 12–78)
AMPHET UR QL SCN: POSITIVE
ANION GAP SERPL CALC-SCNC: 6 MMOL/L (ref 5–15)
APPEARANCE UR: CLEAR
AST SERPL-CCNC: 36 U/L (ref 15–37)
BACTERIA URNS QL MICRO: NEGATIVE /HPF
BARBITURATES UR QL SCN: NEGATIVE
BASOPHILS # BLD: 0.1 K/UL (ref 0–0.1)
BASOPHILS NFR BLD: 1 % (ref 0–1)
BENZODIAZ UR QL: NEGATIVE
BILIRUB SERPL-MCNC: 0.8 MG/DL (ref 0.2–1)
BILIRUB UR QL: NEGATIVE
BUN SERPL-MCNC: 9 MG/DL (ref 6–20)
BUN/CREAT SERPL: 9 (ref 12–20)
CALCIUM SERPL-MCNC: 9.1 MG/DL (ref 8.5–10.1)
CANNABINOIDS UR QL SCN: NEGATIVE
CHLORIDE SERPL-SCNC: 107 MMOL/L (ref 97–108)
CO2 SERPL-SCNC: 25 MMOL/L (ref 21–32)
COCAINE UR QL SCN: NEGATIVE
COLOR UR: ABNORMAL
CREAT SERPL-MCNC: 1.05 MG/DL (ref 0.55–1.02)
DIFFERENTIAL METHOD BLD: ABNORMAL
DRUG SCRN COMMENT,DRGCM: ABNORMAL
EOSINOPHIL # BLD: 0.3 K/UL (ref 0–0.4)
EOSINOPHIL NFR BLD: 3 % (ref 0–7)
EPITH CASTS URNS QL MICRO: ABNORMAL /LPF
ERYTHROCYTE [DISTWIDTH] IN BLOOD BY AUTOMATED COUNT: 13.3 % (ref 11.5–14.5)
GLOBULIN SER CALC-MCNC: 3.5 G/DL (ref 2–4)
GLUCOSE SERPL-MCNC: 94 MG/DL (ref 65–100)
GLUCOSE UR STRIP.AUTO-MCNC: NEGATIVE MG/DL
HCG SERPL QL: NEGATIVE
HCT VFR BLD AUTO: 43.1 % (ref 35–47)
HGB BLD-MCNC: 14.7 G/DL (ref 11.5–16)
HGB UR QL STRIP: ABNORMAL
HYALINE CASTS URNS QL MICRO: ABNORMAL /LPF (ref 0–5)
IMM GRANULOCYTES # BLD AUTO: 0 K/UL (ref 0–0.04)
IMM GRANULOCYTES NFR BLD AUTO: 0 % (ref 0–0.5)
KETONES UR QL STRIP.AUTO: NEGATIVE MG/DL
LEUKOCYTE ESTERASE UR QL STRIP.AUTO: NEGATIVE
LIPASE SERPL-CCNC: 114 U/L (ref 73–393)
LYMPHOCYTES # BLD: 2.2 K/UL (ref 0.8–3.5)
LYMPHOCYTES NFR BLD: 26 % (ref 12–49)
MCH RBC QN AUTO: 32.5 PG (ref 26–34)
MCHC RBC AUTO-ENTMCNC: 34.1 G/DL (ref 30–36.5)
MCV RBC AUTO: 95.1 FL (ref 80–99)
METHADONE UR QL: NEGATIVE
MONOCYTES # BLD: 1 K/UL (ref 0–1)
MONOCYTES NFR BLD: 12 % (ref 5–13)
NEUTS SEG # BLD: 4.8 K/UL (ref 1.8–8)
NEUTS SEG NFR BLD: 58 % (ref 32–75)
NITRITE UR QL STRIP.AUTO: NEGATIVE
NRBC # BLD: 0 K/UL (ref 0–0.01)
NRBC BLD-RTO: 0 PER 100 WBC
OPIATES UR QL: NEGATIVE
PCP UR QL: NEGATIVE
PH UR STRIP: 6 [PH] (ref 5–8)
PLATELET # BLD AUTO: 68 K/UL (ref 150–400)
PMV BLD AUTO: 10.5 FL (ref 8.9–12.9)
POTASSIUM SERPL-SCNC: 3.4 MMOL/L (ref 3.5–5.1)
PROT SERPL-MCNC: 7.2 G/DL (ref 6.4–8.2)
PROT UR STRIP-MCNC: NEGATIVE MG/DL
RBC # BLD AUTO: 4.53 M/UL (ref 3.8–5.2)
RBC #/AREA URNS HPF: ABNORMAL /HPF (ref 0–5)
RBC MORPH BLD: ABNORMAL
SODIUM SERPL-SCNC: 138 MMOL/L (ref 136–145)
SP GR UR REFRACTOMETRY: 1.01 (ref 1–1.03)
UA: UC IF INDICATED,UAUC: ABNORMAL
UROBILINOGEN UR QL STRIP.AUTO: 1 EU/DL (ref 0.2–1)
WBC # BLD AUTO: 8.4 K/UL (ref 3.6–11)
WBC URNS QL MICRO: ABNORMAL /HPF (ref 0–4)

## 2022-04-17 PROCEDURE — 96361 HYDRATE IV INFUSION ADD-ON: CPT

## 2022-04-17 PROCEDURE — 81001 URINALYSIS AUTO W/SCOPE: CPT

## 2022-04-17 PROCEDURE — 74011250636 HC RX REV CODE- 250/636: Performed by: EMERGENCY MEDICINE

## 2022-04-17 PROCEDURE — 74011000250 HC RX REV CODE- 250: Performed by: EMERGENCY MEDICINE

## 2022-04-17 PROCEDURE — 80053 COMPREHEN METABOLIC PANEL: CPT

## 2022-04-17 PROCEDURE — 80307 DRUG TEST PRSMV CHEM ANLYZR: CPT

## 2022-04-17 PROCEDURE — 85025 COMPLETE CBC W/AUTO DIFF WBC: CPT

## 2022-04-17 PROCEDURE — 74011250637 HC RX REV CODE- 250/637: Performed by: EMERGENCY MEDICINE

## 2022-04-17 PROCEDURE — 99285 EMERGENCY DEPT VISIT HI MDM: CPT

## 2022-04-17 PROCEDURE — 96375 TX/PRO/DX INJ NEW DRUG ADDON: CPT

## 2022-04-17 PROCEDURE — 74177 CT ABD & PELVIS W/CONTRAST: CPT

## 2022-04-17 PROCEDURE — 83690 ASSAY OF LIPASE: CPT

## 2022-04-17 PROCEDURE — 74011000636 HC RX REV CODE- 636: Performed by: EMERGENCY MEDICINE

## 2022-04-17 PROCEDURE — 84703 CHORIONIC GONADOTROPIN ASSAY: CPT

## 2022-04-17 PROCEDURE — 36415 COLL VENOUS BLD VENIPUNCTURE: CPT

## 2022-04-17 PROCEDURE — 96374 THER/PROPH/DIAG INJ IV PUSH: CPT

## 2022-04-17 RX ORDER — FAMOTIDINE 20 MG/1
20 TABLET, FILM COATED ORAL 2 TIMES DAILY
Qty: 20 TABLET | Refills: 0 | Status: SHIPPED | OUTPATIENT
Start: 2022-04-17 | End: 2022-04-27

## 2022-04-17 RX ORDER — LOPERAMIDE HYDROCHLORIDE 2 MG/1
2 CAPSULE ORAL
Qty: 20 CAPSULE | Refills: 0 | Status: SHIPPED | OUTPATIENT
Start: 2022-04-17 | End: 2022-04-27

## 2022-04-17 RX ORDER — TRAMADOL HYDROCHLORIDE 50 MG/1
50 TABLET ORAL
Qty: 18 TABLET | Refills: 0 | Status: SHIPPED | OUTPATIENT
Start: 2022-04-17 | End: 2022-04-20

## 2022-04-17 RX ORDER — DICYCLOMINE HYDROCHLORIDE 10 MG/1
20 CAPSULE ORAL
Status: COMPLETED | OUTPATIENT
Start: 2022-04-17 | End: 2022-04-17

## 2022-04-17 RX ORDER — HALOPERIDOL 5 MG/ML
2.5 INJECTION INTRAMUSCULAR
Status: DISCONTINUED | OUTPATIENT
Start: 2022-04-17 | End: 2022-04-17

## 2022-04-17 RX ORDER — KETOROLAC TROMETHAMINE 30 MG/ML
30 INJECTION, SOLUTION INTRAMUSCULAR; INTRAVENOUS
Status: COMPLETED | OUTPATIENT
Start: 2022-04-17 | End: 2022-04-17

## 2022-04-17 RX ORDER — FENTANYL CITRATE 50 UG/ML
50 INJECTION, SOLUTION INTRAMUSCULAR; INTRAVENOUS AS NEEDED
Status: DISCONTINUED | OUTPATIENT
Start: 2022-04-17 | End: 2022-04-17 | Stop reason: HOSPADM

## 2022-04-17 RX ORDER — LOPERAMIDE HYDROCHLORIDE 2 MG/1
2 CAPSULE ORAL
Status: COMPLETED | OUTPATIENT
Start: 2022-04-17 | End: 2022-04-17

## 2022-04-17 RX ORDER — ONDANSETRON 2 MG/ML
8 INJECTION INTRAMUSCULAR; INTRAVENOUS
Status: COMPLETED | OUTPATIENT
Start: 2022-04-17 | End: 2022-04-17

## 2022-04-17 RX ORDER — DICYCLOMINE HYDROCHLORIDE 20 MG/1
20 TABLET ORAL EVERY 6 HOURS
Qty: 20 TABLET | Refills: 0 | Status: SHIPPED | OUTPATIENT
Start: 2022-04-17

## 2022-04-17 RX ADMIN — ALUMINUM HYDROXIDE AND MAGNESIUM HYDROXIDE 30 ML: 200; 200 SUSPENSION ORAL at 04:50

## 2022-04-17 RX ADMIN — KETOROLAC TROMETHAMINE 30 MG: 30 INJECTION, SOLUTION INTRAMUSCULAR at 03:31

## 2022-04-17 RX ADMIN — SODIUM CHLORIDE 1000 ML: 9 INJECTION, SOLUTION INTRAVENOUS at 04:57

## 2022-04-17 RX ADMIN — IOPAMIDOL 100 ML: 755 INJECTION, SOLUTION INTRAVENOUS at 04:24

## 2022-04-17 RX ADMIN — SODIUM CHLORIDE 1000 ML: 9 INJECTION, SOLUTION INTRAVENOUS at 03:30

## 2022-04-17 RX ADMIN — DICYCLOMINE HYDROCHLORIDE 20 MG: 10 CAPSULE ORAL at 05:47

## 2022-04-17 RX ADMIN — FENTANYL CITRATE 50 MCG: 50 INJECTION, SOLUTION INTRAMUSCULAR; INTRAVENOUS at 03:55

## 2022-04-17 RX ADMIN — ONDANSETRON 8 MG: 2 INJECTION INTRAMUSCULAR; INTRAVENOUS at 03:33

## 2022-04-17 RX ADMIN — LOPERAMIDE HYDROCHLORIDE 2 MG: 2 CAPSULE ORAL at 03:36

## 2022-04-17 RX ADMIN — FAMOTIDINE 20 MG: 10 INJECTION, SOLUTION INTRAVENOUS at 04:50

## 2022-04-17 NOTE — ED PROVIDER NOTES
EMERGENCY DEPARTMENT HISTORY AND PHYSICAL EXAM      Please note that this dictation was completed with the assistance of \"Dragon\", the computer voice recognition software. Quite often unanticipated grammatical, syntax, homophones, and other interpretive errors are inadvertently transcribed by the computer software. Please disregard these errors and any errors that have escaped final proofreading. Thank you. Patient: Harvey Rosas  DOS: 22  : 1985  MRN: [id number]  History of Presenting Illness     Chief Complaint   Patient presents with    Abdominal Pain    Vomiting    Diarrhea     History Provided By: Patient/family/EMS (if applicable)    HPI: Harvey Rosas, 31 y.o. female with past medical history as documented below presents to the ED with c/o of generalized abdominal pain x 5 days that has progressively worsened. Pt also reports N/V/D. Denies blood in urine or stool. Pt states that she took imodium, zofran and phenergan with no relief. Describes pain as burning in quality, worse with palpitation and movement. Pt denies any other exacerbating or ameliorating factors. Additionally, pt specifically denies any recent fever, chills, headache, CP, SOB, lightheadedness, dizziness, numbness, weakness, lower extremity swelling, heart palpitations, urinary sxs, constipation, melena, hematochezia, cough, or congestion. There are no other complaints, changes or physical findings pertinent to the HPI at this time.     PCP: Rina, MD Sommer  Past History   Past Medical History:  Past Medical History:   Diagnosis Date    Abnormal Papanicolaou smear of cervix     Acute pyelonephritis 3/22/2018    Anemia     Anxiety     Coagulation disorder ([facility] [address]     thrombocytopenia with pregnancy's    Depression     Disease of blood and blood forming organ     thrombocytopenia    Fetal heart disease 2017    Localized scleroderma        Past Surgical History:  Past Surgical History:   Procedure Laterality Date    HX  SECTION  2017    HX GYN  10/2015        HX HEENT      T&A    [facility] BREAST SURGERY PROCEDURE UNLISTED  12/15/2018    DR. LORENZO SANABRIA    [place]  DELIVERY ONLY         Family History:   Family history reviewed and was non-contributory, unless specified below:  Family History   Problem Relation Age of Onset    No Known Problems Mother     No Known Problems Father        Social History:  Social History     Tobacco Use    Smoking status: Current Some Day Smoker    Smokeless tobacco: Never Used    Tobacco comment: socially with a drink   Substance Use Topics    Alcohol use: Yes     Comment: weekends    Drug use: No       Allergies: Allergies   Allergen Reactions    Morphine Hives       Current Medications:  No current facility-administered medications on file prior to encounter. Current Outpatient Medications on File Prior to Encounter   Medication Sig Dispense Refill    norethindrone (MICRONOR) 0.35 mg tab TAKE 1 TABLET EVERY DAY BY ORAL ROUTE. INDICATIONS: BIRTH CONTROL 84 Tablet 3    busPIRone (BUSPAR) 10 mg tablet Take 1 Tab by mouth daily. 90 Tab 2    sertraline (ZOLOFT) 100 mg tablet Take 1 Tab by mouth daily. (Patient taking differently: Take 50 mg by mouth daily.) 30 Tab 6    buPROPion XL (WELLBUTRIN XL) 300 mg XL tablet Take 1 Tab by mouth every morning. (Patient taking differently: Take 300 mg by mouth every morning. Patient is taking 100mg) 30 Tab 6     Review of Systems   A complete ROS was reviewed by me today and all other systems negative, unless otherwise specified below:  Review of Systems   Constitutional: Negative. Negative for chills and fever. HENT: Negative. Negative for congestion and sore throat. Eyes: Negative. Respiratory: Negative. Negative for cough, chest tightness, shortness of breath and wheezing. Cardiovascular: Negative. Negative for chest pain, palpitations and leg swelling.    Gastrointestinal: Positive for abdominal pain, diarrhea, nausea and vomiting. Negative for abdominal distention, blood in stool and constipation. Endocrine: Negative. Genitourinary: Negative. Negative for dysuria, flank pain, frequency, hematuria and urgency. Musculoskeletal: Negative. Negative for arthralgias, back pain and myalgias. Skin: Negative. Negative for color change and rash. Neurological: Negative. Negative for dizziness, syncope, speech difficulty, weakness, light-headedness, numbness and headaches. Hematological: Negative. Psychiatric/Behavioral: Negative. Negative for confusion and self-injury. The patient is not nervous/anxious. All other systems reviewed and are negative. Physical Exam   Physical Exam  Vitals and nursing note reviewed. Constitutional:       General: She is not in acute distress. Appearance: She is well-developed. She is not diaphoretic. HENT:      Head: Normocephalic and atraumatic. Mouth/Throat:      Pharynx: No oropharyngeal exudate. Eyes:      Conjunctiva/sclera: Conjunctivae normal.   Cardiovascular:      Rate and Rhythm: Normal rate and regular rhythm. Heart sounds: Normal heart sounds. Pulmonary:      Effort: Pulmonary effort is normal. No respiratory distress. Breath sounds: Normal breath sounds. No wheezing or rales. Chest:      Chest wall: No tenderness. Abdominal:      General: Bowel sounds are normal. There is no distension. Palpations: Abdomen is soft. There is no mass. Tenderness: There is no abdominal tenderness. There is no guarding or rebound. Musculoskeletal:         General: Normal range of motion. Cervical back: Normal range of motion. Skin:     General: Skin is warm. Neurological:      Mental Status: She is alert and oriented to person, place, and time. Cranial Nerves: No cranial nerve deficit. Motor: No abnormal muscle tone.        Diagnostic Study Results     Laboratory Data  I have personally reviewed and interpreted all available laboratory results. Recent Results (from the past 24 hour(s))   CBC WITH AUTOMATED DIFF    Collection Time: 04/17/22  3:20 AM   Result Value Ref Range    WBC 8.4 3.6 - 11.0 K/uL    RBC 4.53 3.80 - 5.20 M/uL    HGB 14.7 11.5 - 16.0 g/dL    HCT 43.1 35.0 - 47.0 %    MCV 95.1 80.0 - 99.0 FL    MCH 32.5 26.0 - 34.0 PG    MCHC 34.1 30.0 - 36.5 g/dL    RDW 13.3 11.5 - 14.5 %    PLATELET 68 (L) 009 - 400 K/uL    MPV 10.5 8.9 - 12.9 FL    NRBC 0.0 0  WBC    ABSOLUTE NRBC 0.00 0.00 - 0.01 K/uL    NEUTROPHILS 58 32 - 75 %    LYMPHOCYTES 26 12 - 49 %    MONOCYTES 12 5 - 13 %    EOSINOPHILS 3 0 - 7 %    BASOPHILS 1 0 - 1 %    IMMATURE GRANULOCYTES 0 0.0 - 0.5 %    ABS. NEUTROPHILS 4.8 1.8 - 8.0 K/UL    ABS. LYMPHOCYTES 2.2 0.8 - 3.5 K/UL    ABS. MONOCYTES 1.0 0.0 - 1.0 K/UL    ABS. EOSINOPHILS 0.3 0.0 - 0.4 K/UL    ABS. BASOPHILS 0.1 0.0 - 0.1 K/UL    ABS. IMM. GRANS. 0.0 0.00 - 0.04 K/UL    DF SMEAR SCANNED      RBC COMMENTS NORMOCYTIC, NORMOCHROMIC     METABOLIC PANEL, COMPREHENSIVE    Collection Time: 04/17/22  3:20 AM   Result Value Ref Range    Sodium 138 136 - 145 mmol/L    Potassium 3.4 (L) 3.5 - 5.1 mmol/L    Chloride 107 97 - 108 mmol/L    CO2 25 21 - 32 mmol/L    Anion gap 6 5 - 15 mmol/L    Glucose 94 65 - 100 mg/dL    BUN 9 6 - 20 MG/DL    Creatinine 1.05 (H) 0.55 - 1.02 MG/DL    BUN/Creatinine ratio 9 (L) 12 - 20      GFR est AA >60 >60 ml/min/1.73m2    GFR est non-AA 60 (L) >60 ml/min/1.73m2    Calcium 9.1 8.5 - 10.1 MG/DL    Bilirubin, total 0.8 0.2 - 1.0 MG/DL    ALT (SGPT) 56 12 - 78 U/L    AST (SGOT) 36 15 - 37 U/L    Alk.  phosphatase 63 45 - 117 U/L    Protein, total 7.2 6.4 - 8.2 g/dL    Albumin 3.7 3.5 - 5.0 g/dL    Globulin 3.5 2.0 - 4.0 g/dL    A-G Ratio 1.1 1.1 - 2.2     HCG QL SERUM    Collection Time: 04/17/22  3:20 AM   Result Value Ref Range    HCG, Ql. Negative NEG     LIPASE    Collection Time: 04/17/22  3:20 AM   Result Value Ref Range    Lipase 114 73 - 393 U/L URINALYSIS W/ REFLEX CULTURE    Collection Time: 04/17/22  3:37 AM    Specimen: Urine   Result Value Ref Range    Color DARK YELLOW      Appearance CLEAR CLEAR      Specific gravity 1.014 1.003 - 1.030      pH (UA) 6.0 5.0 - 8.0      Protein Negative NEG mg/dL    Glucose Negative NEG mg/dL    Ketone Negative NEG mg/dL    Bilirubin Negative NEG      Blood TRACE (A) NEG      Urobilinogen 1.0 0.2 - 1.0 EU/dL    Nitrites Negative NEG      Leukocyte Esterase Negative NEG      WBC 0-4 0 - 4 /hpf    RBC 5-10 0 - 5 /hpf    Epithelial cells MODERATE (A) FEW /lpf    Bacteria Negative NEG /hpf    UA:UC IF INDICATED CULTURE NOT INDICATED BY UA RESULT CNI      Hyaline cast 0-2 0 - 5 /lpf   DRUG SCREEN, URINE    Collection Time: 04/17/22  3:37 AM   Result Value Ref Range    AMPHETAMINES Positive (A) NEG      BARBITURATES Negative NEG      BENZODIAZEPINES Negative NEG      COCAINE Negative NEG      METHADONE Negative NEG      OPIATES Negative NEG      PCP(PHENCYCLIDINE) Negative NEG      THC (TH-CANNABINOL) Negative NEG      Drug screen comment (NOTE)        Radiologic Studies   I have personally reviewed and interpreted all available imaging studies and agree with radiology interpretation. CT ABD PELV W CONT   Final Result   Bilateral nephrolithiasis. No acute findings. CT Results  (Last 48 hours)               04/17/22 0425  CT ABD PELV W CONT Final result    Impression:  Bilateral nephrolithiasis. No acute findings. Narrative:  EXAM: CT ABD PELV W CONT       INDICATION: acute abd pain, vomiting, diarrhea       COMPARISON: 2018        CONTRAST: 100 mL of Isovue-370. ORAL CONTRAST: No       TECHNIQUE:    Following the uneventful intravenous administration of contrast, thin axial   images were obtained through the abdomen and pelvis. Coronal and sagittal   reconstructions were generated.  CT dose reduction was achieved through use of a   standardized protocol tailored for this examination and automatic exposure   control for dose modulation. FINDINGS:    LOWER THORAX: No significant abnormality in the incidentally imaged lower chest.   LIVER: No mass. BILIARY TREE: Gallbladder is within normal limits. CBD is not dilated. SPLEEN: within normal limits. PANCREAS: No mass or ductal dilatation. ADRENALS: Unremarkable. KIDNEYS: Bilateral nephrolithiasis. STOMACH: Unremarkable. SMALL BOWEL: No dilatation or wall thickening. COLON: No dilatation or wall thickening. APPENDIX: Normal.   PERITONEUM: No ascites or pneumoperitoneum. RETROPERITONEUM: No lymphadenopathy or aortic aneurysm. REPRODUCTIVE ORGANS: Unremarkable. URINARY BLADDER: No mass or calculus. BONES: No destructive bone lesion. ABDOMINAL WALL: No mass or hernia. ADDITIONAL COMMENTS: N/A               CXR Results  (Last 48 hours)    None        Medical Decision Making   I am the first and primary ED physician for this patient's ED visit today. I reviewed our electronic medical record system for any past medical records that may contribute to the patient's current condition, including their past medical history, surgical history, social and family history. This also includes their most recent ED visits, previous hospitalizations and prior diagnostic data. I have reviewed and summarized the most pertinent findings in my HPI and MDM. Vital Signs Reviewed:  Patient Vitals for the past 24 hrs:   Temp Pulse Resp BP SpO2   04/17/22 0500     100 %   04/17/22 0400    (!) 111/94 98 %   04/17/22 0321 97.6 °F (36.4 °C) 93 14 (!) 131/91 98 %     Pulse Oximetry Analysis: 98% on RA    Cardiac Monitor:   Rate: 93 bpm  The cardiac monitor revealed the following rhythm as interpreted by me: Normal Sinus Rhythm  Cardiac monitoring was ordered to monitor patient for signs of cardiac dysrhythmia, which they are at risk for based on their history and/or risk for cardiovascular disease and/or metabolic abnormalities.      Records Reviewed: Nursing Notes, Old Medical Records, Previous electrocardiograms, Previous Radiology Studies and Previous Laboratory Studies, EMS reports    Provider Notes (Medical Decision Making):   Pt presents with acute abdominal pain; vital signs stable with currently a non-peritoneal exam; DDx includes: Gastroenteritis, hepatitis, pancreatitis, obstruction, appendicitis, viral illness, IBD, diverticulitis, mesenteric ischemia, AAA or descending dissection, ACS, kidney stone. Will get labs, treat symptomatically and obtain serial abdominal exams to determine if additional imaging is indicated. Will reassess and monitor closely. ED Course:   Initial assessment performed. I discussed presenting problems and concerns, and my formulated plan for today's visit with the patient and any available family members. I have encouraged them to ask questions as they arise throughout the visit. Social History     Tobacco Use    Smoking status: Current Some Day Smoker    Smokeless tobacco: Never Used    Tobacco comment: socially with a drink   Substance Use Topics    Alcohol use: Yes     Comment: weekends    Drug use: No     TOBACCO COUNSELING:  Upon evaluation, pt expressed that they are a current tobacco user. For approximately 3-5 mins, pt has been counseled on the dangers of smoking and was encouraged to quit as soon as possible in order to decrease further risks to their health. Pt has conveyed their understanding of the risks involved should they continue to use tobacco products.     ED Orders Placed:  Orders Placed This Encounter    CT ABD PELV W CONT    CBC WITH AUTOMATED DIFF    METABOLIC PANEL, COMPREHENSIVE    HCG QL., SERUM    LIPASE    URINALYSIS W/ REFLEX CULTURE    DRUG SCREEN, URINE    sodium chloride 0.9 % bolus infusion 1,000 mL    ondansetron (ZOFRAN) injection 8 mg    DISCONTD: haloperidol lactate (HALDOL) injection 2.5 mg    ketorolac (TORADOL) injection 30 mg    loperamide (IMODIUM) capsule 2 mg    iopamidoL (ISOVUE-370) 76 % injection 100 mL    DISCONTD: fentaNYL citrate (PF) injection 50 mcg    famotidine (PF) (PEPCID) 20 mg in 0.9% sodium chloride 10 mL injection    aluminum-magnesium hydroxide (MAALOX) oral suspension 30 mL    famotidine (Pepcid) 20 mg tablet    dicyclomine (BENTYL) 20 mg tablet    loperamide (IMODIUM) 2 mg capsule    traMADoL (Ultram) 50 mg tablet    sodium chloride 0.9 % bolus infusion 1,000 mL    dicyclomine (BENTYL) capsule 20 mg       ED Medications Administered During ED Course:  Medications   sodium chloride 0.9 % bolus infusion 1,000 mL (0 mL IntraVENous Stopped 4/17/22 0456)   ondansetron (ZOFRAN) injection 8 mg (8 mg IntraVENous Given 4/17/22 0333)   ketorolac (TORADOL) injection 30 mg (30 mg IntraVENous Given 4/17/22 0331)   loperamide (IMODIUM) capsule 2 mg (2 mg Oral Given 4/17/22 0336)   iopamidoL (ISOVUE-370) 76 % injection 100 mL (100 mL IntraVENous Given 4/17/22 0424)   famotidine (PF) (PEPCID) 20 mg in 0.9% sodium chloride 10 mL injection (20 mg IntraVENous Given 4/17/22 0450)   aluminum-magnesium hydroxide (MAALOX) oral suspension 30 mL (30 mL Oral Given 4/17/22 0450)   sodium chloride 0.9 % bolus infusion 1,000 mL (0 mL IntraVENous Stopped 4/17/22 0545)   dicyclomine (BENTYL) capsule 20 mg (20 mg Oral Given 4/17/22 0547)        Progress Note:  I have just re-evaluated the patient. Patient reports improvement of sx's. I have reviewed Her vital signs and determined there is currently no worsening in their condition or physical exam. Results have been reviewed with them and their questions have been answered. We will continue to review further results as they come available. Progress Note:  I have re-examined the patient. Pt states she feels much better and symptoms improved. Tolerating oral intake. Abdomen is soft and without guarding, rebound or other peritoneal signs.  I have discussed with patient the importance of close f/u and to return to the ED if symptoms don't improve or worsen. Progress Note:  Pt reassessed and symptoms noted to have improved significantly after ED treatment. Pt is clinically stable for discharge. Winsome Pugh's labs and imaging have been reviewed with her and available family. She verbally conveys understanding and agreement of the signs, symptoms, diagnosis, treatment and prognosis and additionally agrees to follow up as recommended with Dr. Jose Martin Amado MD and/or specialist as instructed. She agrees with the care plan we have created and conveys that all of her questions have been answered. Additionally, I have put together a packet of discharge instructions for her that include: 1) educational information regarding their diagnosis, 2) how to care for their diagnosis at home, as well a 3) list of reasons why they would want to return to the ED prior to their follow-up appointment should the patient's condition change or symptoms worsen. I have answered all questions to the patient's satisfaction. Strict return precautions given. She conveyed understanding and agreement with care plan. Vital signs stable for discharge. Disposition:  DISCHARGE  The pt is ready for discharge. The pt's signs, symptoms, diagnosis, and discharge instructions have been discussed and pt has conveyed their understanding. The pt is to follow up as recommended or return to ER should their symptoms worsen. Plan has been discussed and pt is in agreement. Plan:  1. Return precautions as discussed with patient and available family/caregiver. 2.   Discharge Medication List as of 4/17/2022  4:47 AM      START taking these medications    Details   famotidine (Pepcid) 20 mg tablet Take 1 Tablet by mouth two (2) times a day for 10 days. , Normal, Disp-20 Tablet, R-0      dicyclomine (BENTYL) 20 mg tablet Take 1 Tablet by mouth every six (6) hours. , Normal, Disp-20 Tablet, R-0      loperamide (IMODIUM) 2 mg capsule Take 1 Capsule by mouth four (4) times daily as needed for Diarrhea for up to 10 days. , Normal, Disp-20 Capsule, R-0      traMADoL (Ultram) 50 mg tablet Take 1 Tablet by mouth every four (4) hours as needed for Pain for up to 3 days. Max Daily Amount: 300 mg., Normal, Disp-18 Tablet, R-0         CONTINUE these medications which have NOT CHANGED    Details   norethindrone (MICRONOR) 0.35 mg tab TAKE 1 TABLET EVERY DAY BY ORAL ROUTE. INDICATIONS: BIRTH CONTROL, Normal, Disp-84 Tablet, R-3      busPIRone (BUSPAR) 10 mg tablet Take 1 Tab by mouth daily. , Normal, Disp-90 Tab, R-2      sertraline (ZOLOFT) 100 mg tablet Take 1 Tab by mouth daily. , Normal, Disp-30 Tab,R-6      buPROPion XL (WELLBUTRIN XL) 300 mg XL tablet Take 1 Tab by mouth every morning., Normal, Disp-30 Tab,R-6           3. Follow-up Information     Follow up With Specialties Details Why Contact Info    [facility] EMERGENCY DEPT Emergency Medicine  As needed, If symptoms worsen [address]    [facility]    [address]  [facility] [address] to return to ED if worse  Diagnosis   Clinical Impression:  1. Acute abdominal pain    2. Acute vomiting    3. Diarrhea, unspecified type    4. Gastroesophageal reflux disease without esophagitis      Attestation:  Jeremy Ahn MD, am the attending of record for this patient. I personally performed the services described in this documentation on this date, 4/17/2022 for patient, Edwina Perez. I have reviewed the chart and verified that the record is accurate and complete.

## 2022-04-17 NOTE — DISCHARGE INSTRUCTIONS
Thank You! It was a pleasure taking care of you in our Emergency Department today. We know that when you come to [facility], you are entrusting us with your health, comfort, and safety. Our physicians and nurses honor that trust, and truly appreciate the opportunity to care for you and your loved ones. We also value your feedback. If you receive a survey about your Emergency Department experience today, please fill it out. We care about our patients' feedback, and we listen to what you have to say. Thank you. Dr. Penny Garcia M.D.      ________________________________________________________________________  I have included a copy of your lab results and/or radiologic studies from today's visit so you can have them easily available at your follow-up visit. We hope you feel better and please do not hesitate to contact the ED if you have any questions at all! Recent Results (from the past 12 hour(s))   CBC WITH AUTOMATED DIFF    Collection Time: 04/17/22  3:20 AM   Result Value Ref Range    WBC 8.4 3.6 - 11.0 K/uL    RBC 4.53 3.80 - 5.20 M/uL    HGB 14.7 11.5 - 16.0 g/dL    HCT 43.1 35.0 - 47.0 %    MCV 95.1 80.0 - 99.0 FL    MCH 32.5 26.0 - 34.0 PG    MCHC 34.1 30.0 - 36.5 g/dL    RDW 13.3 11.5 - 14.5 %    PLATELET 68 (L) 715 - 400 K/uL    MPV 10.5 8.9 - 12.9 FL    NRBC 0.0 0  WBC    ABSOLUTE NRBC 0.00 0.00 - 0.01 K/uL    NEUTROPHILS 58 32 - 75 %    LYMPHOCYTES 26 12 - 49 %    MONOCYTES 12 5 - 13 %    EOSINOPHILS 3 0 - 7 %    BASOPHILS 1 0 - 1 %    IMMATURE GRANULOCYTES 0 0.0 - 0.5 %    ABS. NEUTROPHILS 4.8 1.8 - 8.0 K/UL    ABS. LYMPHOCYTES 2.2 0.8 - 3.5 K/UL    ABS. MONOCYTES 1.0 0.0 - 1.0 K/UL    ABS. EOSINOPHILS 0.3 0.0 - 0.4 K/UL    ABS. BASOPHILS 0.1 0.0 - 0.1 K/UL    ABS. IMM.  GRANS. 0.0 0.00 - 0.04 K/UL    DF SMEAR SCANNED      RBC COMMENTS NORMOCYTIC, NORMOCHROMIC     METABOLIC PANEL, COMPREHENSIVE    Collection Time: 04/17/22  3:20 AM   Result Value Ref Range    Sodium 138 136 - 145 mmol/L    Potassium 3.4 (L) 3.5 - 5.1 mmol/L    Chloride 107 97 - 108 mmol/L    CO2 25 21 - 32 mmol/L    Anion gap 6 5 - 15 mmol/L    Glucose 94 65 - 100 mg/dL    BUN 9 6 - 20 MG/DL    Creatinine 1.05 (H) 0.55 - 1.02 MG/DL    BUN/Creatinine ratio 9 (L) 12 - 20      GFR est AA >60 >60 ml/min/1.73m2    GFR est non-AA 60 (L) >60 ml/min/1.73m2    Calcium 9.1 8.5 - 10.1 MG/DL    Bilirubin, total 0.8 0.2 - 1.0 MG/DL    ALT (SGPT) 56 12 - 78 U/L    AST (SGOT) 36 15 - 37 U/L    Alk.  phosphatase 63 45 - 117 U/L    Protein, total 7.2 6.4 - 8.2 g/dL    Albumin 3.7 3.5 - 5.0 g/dL    Globulin 3.5 2.0 - 4.0 g/dL    A-G Ratio 1.1 1.1 - 2.2     HCG QL SERUM    Collection Time: 04/17/22  3:20 AM   Result Value Ref Range    HCG, Ql. Negative NEG     LIPASE    Collection Time: 04/17/22  3:20 AM   Result Value Ref Range    Lipase 114 73 - 393 U/L   URINALYSIS W/ REFLEX CULTURE    Collection Time: 04/17/22  3:37 AM    Specimen: Urine   Result Value Ref Range    Color DARK YELLOW      Appearance CLEAR CLEAR      Specific gravity 1.014 1.003 - 1.030      pH (UA) 6.0 5.0 - 8.0      Protein Negative NEG mg/dL    Glucose Negative NEG mg/dL    Ketone Negative NEG mg/dL    Bilirubin Negative NEG      Blood TRACE (A) NEG      Urobilinogen 1.0 0.2 - 1.0 EU/dL    Nitrites Negative NEG      Leukocyte Esterase Negative NEG      WBC 0-4 0 - 4 /hpf    RBC 5-10 0 - 5 /hpf    Epithelial cells MODERATE (A) FEW /lpf    Bacteria Negative NEG /hpf    UA:UC IF INDICATED CULTURE NOT INDICATED BY UA RESULT CNI      Hyaline cast 0-2 0 - 5 /lpf   DRUG SCREEN, URINE    Collection Time: 04/17/22  3:37 AM   Result Value Ref Range    AMPHETAMINES Positive (A) NEG      BARBITURATES Negative NEG      BENZODIAZEPINES Negative NEG      COCAINE Negative NEG      METHADONE Negative NEG      OPIATES Negative NEG      PCP(PHENCYCLIDINE) Negative NEG      THC (TH-CANNABINOL) Negative NEG      Drug screen comment (NOTE)        CT ABD PELV W CONT   Final Result   Bilateral nephrolithiasis. No acute findings. CT Results  (Last 48 hours)                 04/17/22 0425  CT ABD PELV W CONT Final result    Impression:  Bilateral nephrolithiasis. No acute findings. Narrative:  EXAM: CT ABD PELV W CONT       INDICATION: acute abd pain, vomiting, diarrhea       COMPARISON: 2018        CONTRAST: 100 mL of Isovue-370. ORAL CONTRAST: No       TECHNIQUE:    Following the uneventful intravenous administration of contrast, thin axial   images were obtained through the abdomen and pelvis. Coronal and sagittal   reconstructions were generated. CT dose reduction was achieved through use of a   standardized protocol tailored for this examination and automatic exposure   control for dose modulation. FINDINGS:    LOWER THORAX: No significant abnormality in the incidentally imaged lower chest.   LIVER: No mass. BILIARY TREE: Gallbladder is within normal limits. CBD is not dilated. SPLEEN: within normal limits. PANCREAS: No mass or ductal dilatation. ADRENALS: Unremarkable. KIDNEYS: Bilateral nephrolithiasis. STOMACH: Unremarkable. SMALL BOWEL: No dilatation or wall thickening. COLON: No dilatation or wall thickening. APPENDIX: Normal.   PERITONEUM: No ascites or pneumoperitoneum. RETROPERITONEUM: No lymphadenopathy or aortic aneurysm. REPRODUCTIVE ORGANS: Unremarkable. URINARY BLADDER: No mass or calculus. BONES: No destructive bone lesion. ABDOMINAL WALL: No mass or hernia. ADDITIONAL COMMENTS: N/A                 The exam and treatment you received in the Emergency Department were for an urgent problem and are not intended as complete care. It is important that you follow up with a doctor, nurse practitioner, or physician assistant for ongoing care. If your symptoms become worse or you do not improve as expected and you are unable to reach your usual health care provider, you should return to the Emergency Department. We are available 24 hours a day. Please take your discharge instructions with you when you go to your follow-up appointment. If a prescription has been provided, please have it filled as soon as possible to prevent a delay in treatment. Read the entire medication instruction sheet provided to you by the pharmacy. If you have any questions or reservations about taking the medication due to side effects or interactions with other medications, please call your primary care physician or contact the ER to speak with the charge nurse. Please make an appointment with your family doctor or the physician you were referred to for follow-up of this visit as instructed on your discharge paperwork. Return to the ER if you are unable to be seen or if you are unable to be seen in a timely manner. If you have any problem arranging the follow-up visit, contact the Emergency Department immediately.

## 2022-04-17 NOTE — ED NOTES
Discharge instructions reviewed with patient, verbalizes understanding. PIV removed. Pt to lobby to wait for ride.

## 2022-04-17 NOTE — ED TRIAGE NOTES
Pt arrived via EMS from home with c/o generalized abdominal pain x 5 days that has progressively worsened. Pt also reports N/V/D. Denies blood in urine or stool. Pt states that she took imodium, zofran and phenergan with no relief.

## 2022-06-28 ENCOUNTER — HOSPITAL ENCOUNTER (EMERGENCY)
Age: 37
Discharge: HOME | End: 2022-06-28
Attending: EMERGENCY MEDICINE
Payer: COMMERCIAL

## 2022-06-28 ENCOUNTER — APPOINTMENT (OUTPATIENT)
Dept: CT IMAGING | Age: 37
End: 2022-06-28
Attending: PHYSICIAN ASSISTANT
Payer: COMMERCIAL

## 2022-06-28 VITALS
DIASTOLIC BLOOD PRESSURE: 84 MMHG | BODY MASS INDEX: 29.8 KG/M2 | SYSTOLIC BLOOD PRESSURE: 149 MMHG | HEIGHT: 68 IN | OXYGEN SATURATION: 100 % | TEMPERATURE: 98 F | RESPIRATION RATE: 16 BRPM | HEART RATE: 85 BPM | WEIGHT: 196.65 LBS

## 2022-06-28 DIAGNOSIS — E83.59 NEPHROCALCINOSIS: ICD-10-CM

## 2022-06-28 DIAGNOSIS — M54.50 ACUTE RIGHT-SIDED LOW BACK PAIN WITHOUT SCIATICA: Primary | ICD-10-CM

## 2022-06-28 DIAGNOSIS — M48.061 SPINAL STENOSIS OF LUMBAR REGION WITHOUT NEUROGENIC CLAUDICATION: ICD-10-CM

## 2022-06-28 DIAGNOSIS — N29 NEPHROCALCINOSIS: ICD-10-CM

## 2022-06-28 LAB
ALBUMIN SERPL-MCNC: 3.8 G/DL (ref 3.5–5)
ALBUMIN/GLOB SERPL: 1 {RATIO} (ref 1.1–2.2)
ALP SERPL-CCNC: 58 U/L (ref 45–117)
ALT SERPL-CCNC: 26 U/L (ref 12–78)
ANION GAP SERPL CALC-SCNC: 3 MMOL/L (ref 5–15)
APPEARANCE UR: CLEAR
AST SERPL-CCNC: 19 U/L (ref 15–37)
BACTERIA URNS QL MICRO: NEGATIVE /HPF
BASOPHILS # BLD: 0 K/UL (ref 0–0.1)
BASOPHILS NFR BLD: 0 % (ref 0–1)
BILIRUB SERPL-MCNC: 0.5 MG/DL (ref 0.2–1)
BILIRUB UR QL: NEGATIVE
BUN SERPL-MCNC: 14 MG/DL (ref 6–20)
BUN/CREAT SERPL: 17 (ref 12–20)
CALCIUM SERPL-MCNC: 9.3 MG/DL (ref 8.5–10.1)
CHLORIDE SERPL-SCNC: 108 MMOL/L (ref 97–108)
CO2 SERPL-SCNC: 27 MMOL/L (ref 21–32)
COLOR UR: ABNORMAL
CREAT SERPL-MCNC: 0.81 MG/DL (ref 0.55–1.02)
DIFFERENTIAL METHOD BLD: ABNORMAL
EOSINOPHIL # BLD: 0.2 K/UL (ref 0–0.4)
EOSINOPHIL NFR BLD: 3 % (ref 0–7)
EPITH CASTS URNS QL MICRO: ABNORMAL /LPF
ERYTHROCYTE [DISTWIDTH] IN BLOOD BY AUTOMATED COUNT: 13.5 % (ref 11.5–14.5)
GLOBULIN SER CALC-MCNC: 3.7 G/DL (ref 2–4)
GLUCOSE SERPL-MCNC: 86 MG/DL (ref 65–100)
GLUCOSE UR STRIP.AUTO-MCNC: NEGATIVE MG/DL
HCG UR QL: NEGATIVE
HCT VFR BLD AUTO: 40.4 % (ref 35–47)
HGB BLD-MCNC: 13.7 G/DL (ref 11.5–16)
HGB UR QL STRIP: NEGATIVE
HYALINE CASTS URNS QL MICRO: ABNORMAL /LPF (ref 0–2)
IMM GRANULOCYTES # BLD AUTO: 0 K/UL (ref 0–0.04)
IMM GRANULOCYTES NFR BLD AUTO: 0 % (ref 0–0.5)
KETONES UR QL STRIP.AUTO: NEGATIVE MG/DL
LEUKOCYTE ESTERASE UR QL STRIP.AUTO: NEGATIVE
LYMPHOCYTES # BLD: 2.4 K/UL (ref 0.8–3.5)
LYMPHOCYTES NFR BLD: 26 % (ref 12–49)
MCH RBC QN AUTO: 34 PG (ref 26–34)
MCHC RBC AUTO-ENTMCNC: 33.9 G/DL (ref 30–36.5)
MCV RBC AUTO: 100.2 FL (ref 80–99)
MONOCYTES # BLD: 0.6 K/UL (ref 0–1)
MONOCYTES NFR BLD: 6 % (ref 5–13)
NEUTS SEG # BLD: 5.9 K/UL (ref 1.8–8)
NEUTS SEG NFR BLD: 65 % (ref 32–75)
NITRITE UR QL STRIP.AUTO: NEGATIVE
NRBC # BLD: 0 K/UL (ref 0–0.01)
NRBC BLD-RTO: 0 PER 100 WBC
PH UR STRIP: 6 [PH] (ref 5–8)
PLATELET # BLD AUTO: 132 K/UL (ref 150–400)
PMV BLD AUTO: 9.3 FL (ref 8.9–12.9)
POTASSIUM SERPL-SCNC: 4.4 MMOL/L (ref 3.5–5.1)
PROT SERPL-MCNC: 7.5 G/DL (ref 6.4–8.2)
PROT UR STRIP-MCNC: NEGATIVE MG/DL
RBC # BLD AUTO: 4.03 M/UL (ref 3.8–5.2)
RBC #/AREA URNS HPF: ABNORMAL /HPF (ref 0–5)
SODIUM SERPL-SCNC: 138 MMOL/L (ref 136–145)
SP GR UR REFRACTOMETRY: 1.02
UA: UC IF INDICATED,UAUC: ABNORMAL
UROBILINOGEN UR QL STRIP.AUTO: 0.2 EU/DL (ref 0.2–1)
WBC # BLD AUTO: 9.2 K/UL (ref 3.6–11)
WBC URNS QL MICRO: ABNORMAL /HPF (ref 0–4)

## 2022-06-28 PROCEDURE — 74176 CT ABD & PELVIS W/O CONTRAST: CPT

## 2022-06-28 PROCEDURE — 96374 THER/PROPH/DIAG INJ IV PUSH: CPT

## 2022-06-28 PROCEDURE — 80053 COMPREHEN METABOLIC PANEL: CPT

## 2022-06-28 PROCEDURE — 81025 URINE PREGNANCY TEST: CPT

## 2022-06-28 PROCEDURE — 74011250637 HC RX REV CODE- 250/637: Performed by: PHYSICIAN ASSISTANT

## 2022-06-28 PROCEDURE — 74011250636 HC RX REV CODE- 250/636: Performed by: PHYSICIAN ASSISTANT

## 2022-06-28 PROCEDURE — 81001 URINALYSIS AUTO W/SCOPE: CPT

## 2022-06-28 PROCEDURE — 85025 COMPLETE CBC W/AUTO DIFF WBC: CPT

## 2022-06-28 PROCEDURE — 36415 COLL VENOUS BLD VENIPUNCTURE: CPT

## 2022-06-28 PROCEDURE — 96375 TX/PRO/DX INJ NEW DRUG ADDON: CPT

## 2022-06-28 PROCEDURE — 99284 EMERGENCY DEPT VISIT MOD MDM: CPT

## 2022-06-28 RX ORDER — DIAZEPAM 5 MG/1
5 TABLET ORAL
Status: COMPLETED | OUTPATIENT
Start: 2022-06-28 | End: 2022-06-28

## 2022-06-28 RX ORDER — HYDROMORPHONE HYDROCHLORIDE 1 MG/ML
0.5 INJECTION, SOLUTION INTRAMUSCULAR; INTRAVENOUS; SUBCUTANEOUS ONCE
Status: COMPLETED | OUTPATIENT
Start: 2022-06-28 | End: 2022-06-28

## 2022-06-28 RX ORDER — KETOROLAC TROMETHAMINE 30 MG/ML
15 INJECTION, SOLUTION INTRAMUSCULAR; INTRAVENOUS
Status: COMPLETED | OUTPATIENT
Start: 2022-06-28 | End: 2022-06-28

## 2022-06-28 RX ORDER — OXYCODONE HYDROCHLORIDE 5 MG/1
5 TABLET ORAL
Qty: 10 TABLET | Refills: 0 | Status: SHIPPED | OUTPATIENT
Start: 2022-06-28 | End: 2022-07-01

## 2022-06-28 RX ADMIN — HYDROMORPHONE HYDROCHLORIDE 0.5 MG: 1 INJECTION, SOLUTION INTRAMUSCULAR; INTRAVENOUS; SUBCUTANEOUS at 20:44

## 2022-06-28 RX ADMIN — KETOROLAC TROMETHAMINE 15 MG: 30 INJECTION, SOLUTION INTRAMUSCULAR at 19:57

## 2022-06-28 RX ADMIN — DIAZEPAM 5 MG: 5 TABLET ORAL at 19:57

## 2022-06-28 NOTE — ED NOTES
Bedside shift change report given to Kirk (oncoming nurse) by Myrtle Litten RN (offgoing nurse). Report included the following information SBAR, ED Summary, Intake/Output, MAR and Recent Results.

## 2022-06-29 NOTE — ED PROVIDER NOTES
EMERGENCY DEPARTMENT HISTORY AND PHYSICAL EXAM      Date: 6/28/2022  Patient Name: Sugar Norwood    History of Presenting Illness     Chief Complaint   Patient presents with    Back Pain     pt with hx of scleroderma has had right low back pain x 3 weeks. pt went to free standing er on [address] who did a urine test, which was negative. They told her to come to this ER for an MRI       History Provided By: Patient    HPI: Sugar Norwood, 31 y.o. female with PMHx significant for scleroderma, presents to the ED with cc of right back pain onset 3 weeks ago. She reports the pain has gradually worsened and is now severe. It is worse with movement and lifting items. She has tried OTC medications without relief. She was diagnosed with nephrolithiasis about 2 months ago and thought this pain was being caused by a kidney stone. She went to another facility yesterday and has a urine test. She was told there is no sign of a kidney stone and that if the pain gets worse, to come to the ER for an MRI. She denies radiation of pain into leg, lower extremity numbness or weakness, bowel or bladder dysfunction, saddle anesthesia, fevers, abdominal pain, hematuria, dysuria, injury. There are no other complaints, changes, or physical findings at this time. PCP: Other, MD Sommer    No current facility-administered medications on file prior to encounter. Current Outpatient Medications on File Prior to Encounter   Medication Sig Dispense Refill    dicyclomine (BENTYL) 20 mg tablet Take 1 Tablet by mouth every six (6) hours. 20 Tablet 0    norethindrone (MICRONOR) 0.35 mg tab TAKE 1 TABLET EVERY DAY BY ORAL ROUTE. INDICATIONS: BIRTH CONTROL 84 Tablet 3    busPIRone (BUSPAR) 10 mg tablet Take 1 Tab by mouth daily. 90 Tab 2    sertraline (ZOLOFT) 100 mg tablet Take 1 Tab by mouth daily.  (Patient taking differently: Take 50 mg by mouth daily.) 30 Tab 6    buPROPion XL (WELLBUTRIN XL) 300 mg XL tablet Take 1 Tab by mouth every morning. (Patient taking differently: Take 300 mg by mouth every morning. Patient is taking 100mg) 30 Tab 6       Past History     Past Medical History:  Past Medical History:   Diagnosis Date    Abnormal Papanicolaou smear of cervix     Acute pyelonephritis 3/22/2018    Anemia     Anxiety     Coagulation disorder ([address]     thrombocytopenia with pregnancy's    Depression     Disease of blood and blood forming organ     thrombocytopenia    Fetal heart disease 2017    Localized scleroderma        Past Surgical History:  Past Surgical History:   Procedure Laterality Date    HX  SECTION  2017    HX GYN  10/2015        HX HEENT      T&A    [place] BREAST SURGERY PROCEDURE UNLISTED  12/15/2018    DR. LORENZO SANABRIA    [place]  DELIVERY ONLY         Family History:  Family History   Problem Relation Age of Onset    No Known Problems Mother     No Known Problems Father        Social History:  Social History     Tobacco Use    Smoking status: Current Some Day Smoker    Smokeless tobacco: Never Used    Tobacco comment: socially with a drink   Substance Use Topics    Alcohol use: Yes     Comment: weekends    Drug use: No       Allergies: Allergies   Allergen Reactions    Morphine Hives         Review of Systems   Review of Systems   Constitutional: Negative for chills and fever. HENT: Negative for ear pain and sore throat. Eyes: Negative for redness and visual disturbance. Respiratory: Negative for cough and shortness of breath. Cardiovascular: Negative for chest pain and palpitations. Gastrointestinal: Negative for abdominal pain, nausea and vomiting. Genitourinary: Negative for dysuria and hematuria. Musculoskeletal: Positive for back pain. Negative for gait problem. Skin: Negative for rash and wound. Neurological: Negative for dizziness and headaches. Psychiatric/Behavioral: Negative for behavioral problems and confusion.    All other systems reviewed and are negative. Physical Exam   Physical Exam  Constitutional:       Appearance: She is not toxic-appearing. HENT:      Head: Normocephalic and atraumatic. Mouth/Throat:      Mouth: Mucous membranes are moist.   Eyes:      Extraocular Movements: Extraocular movements intact. Pupils: Pupils are equal, round, and reactive to light. Cardiovascular:      Rate and Rhythm: Normal rate and regular rhythm. Pulmonary:      Effort: Pulmonary effort is normal. No respiratory distress. Abdominal:      Comments: Abdomen is soft. No tenderness to palpation. Musculoskeletal:         General: No deformity. Normal range of motion. Cervical back: Normal range of motion and neck supple. Back:       Comments: Tenderness to palpation of the right lower thoracic/upper lumbar paraspinal muscles. No midline spinal tenderness. Skin:     General: Skin is warm and dry. Neurological:      General: No focal deficit present. Mental Status: She is alert and oriented to person, place, and time. Sensory: No sensory deficit. Motor: No weakness. Comments: Patient ambulates into room without assistance nor antalgic gait. Psychiatric:         Behavior: Behavior normal.           Diagnostic Study Results     Labs -     Recent Results (from the past 12 hour(s))   CBC WITH AUTOMATED DIFF    Collection Time: 06/28/22  7:40 PM   Result Value Ref Range    WBC 9.2 3.6 - 11.0 K/uL    RBC 4.03 3.80 - 5.20 M/uL    HGB 13.7 11.5 - 16.0 g/dL    HCT 40.4 35.0 - 47.0 %    .2 (H) 80.0 - 99.0 FL    MCH 34.0 26.0 - 34.0 PG    MCHC 33.9 30.0 - 36.5 g/dL    RDW 13.5 11.5 - 14.5 %    PLATELET 487 (L) 986 - 400 K/uL    MPV 9.3 8.9 - 12.9 FL    NRBC 0.0 0  WBC    ABSOLUTE NRBC 0.00 0.00 - 0.01 K/uL    NEUTROPHILS 65 32 - 75 %    LYMPHOCYTES 26 12 - 49 %    MONOCYTES 6 5 - 13 %    EOSINOPHILS 3 0 - 7 %    BASOPHILS 0 0 - 1 %    IMMATURE GRANULOCYTES 0 0.0 - 0.5 %    ABS.  NEUTROPHILS 5.9 1.8 - 8.0 K/UL    ABS. LYMPHOCYTES 2.4 0.8 - 3.5 K/UL    ABS. MONOCYTES 0.6 0.0 - 1.0 K/UL    ABS. EOSINOPHILS 0.2 0.0 - 0.4 K/UL    ABS. BASOPHILS 0.0 0.0 - 0.1 K/UL    ABS. IMM. GRANS. 0.0 0.00 - 0.04 K/UL    DF AUTOMATED     METABOLIC PANEL, COMPREHENSIVE    Collection Time: 06/28/22  7:40 PM   Result Value Ref Range    Sodium 138 136 - 145 mmol/L    Potassium 4.4 3.5 - 5.1 mmol/L    Chloride 108 97 - 108 mmol/L    CO2 27 21 - 32 mmol/L    Anion gap 3 (L) 5 - 15 mmol/L    Glucose 86 65 - 100 mg/dL    BUN 14 6 - 20 MG/DL    Creatinine 0.81 0.55 - 1.02 MG/DL    BUN/Creatinine ratio 17 12 - 20      GFR est AA >60 >60 ml/min/1.73m2    GFR est non-AA >60 >60 ml/min/1.73m2    Calcium 9.3 8.5 - 10.1 MG/DL    Bilirubin, total 0.5 0.2 - 1.0 MG/DL    ALT (SGPT) 26 12 - 78 U/L    AST (SGOT) 19 15 - 37 U/L    Alk. phosphatase 58 45 - 117 U/L    Protein, total 7.5 6.4 - 8.2 g/dL    Albumin 3.8 3.5 - 5.0 g/dL    Globulin 3.7 2.0 - 4.0 g/dL    A-G Ratio 1.0 (L) 1.1 - 2.2     URINALYSIS W/ REFLEX CULTURE    Collection Time: 06/28/22  7:48 PM    Specimen: Urine   Result Value Ref Range    Color YELLOW/STRAW      Appearance CLEAR CLEAR      Specific gravity 1.019      pH (UA) 6.0 5.0 - 8.0      Protein Negative NEG mg/dL    Glucose Negative NEG mg/dL    Ketone Negative NEG mg/dL    Bilirubin Negative NEG      Blood Negative NEG      Urobilinogen 0.2 0.2 - 1.0 EU/dL    Nitrites Negative NEG      Leukocyte Esterase Negative NEG      UA:UC IF INDICATED CULTURE NOT INDICATED BY UA RESULT CNI      WBC 0-4 0 - 4 /hpf    RBC 0-5 0 - 5 /hpf    Epithelial cells MODERATE (A) FEW /lpf    Bacteria Negative NEG /hpf    Hyaline cast 0-2 0 - 2 /lpf   HCG URINE, QL. - POC    Collection Time: 06/28/22  7:50 PM   Result Value Ref Range    Pregnancy test,urine (POC) Negative NEG         Radiologic Studies -   CT ABD PELV WO CONT   Final Result   Medullary nephrocalcinosis. No obstructing calculi.         CT Results  (Last 48 hours)               06/28/22 1932  CT ABD PELV WO CONT Final result    Impression:  Medullary nephrocalcinosis. No obstructing calculi. Narrative:  CT ABDOMEN AND PELVIS WITHOUT CONTRAST. 6/28/2022 7:32 PM        INDICATION: Right-sided back pain, nephrolithiasis. COMPARISON: 4/17/2022. TECHNIQUE: CT of the abdomen and pelvis was performed without contrast. CT dose   reduction was achieved through use of a standardized protocol tailored for this   examination and automatic exposure control for dose modulation. FINDINGS:   Kidneys: Numerous bilateral renal calcifications are consistent with medullary   nephrocalcinosis. Differential etiologies (from most to least common) include:   medullary sponge kidney, any cause of hypercalcemia, renal tubular acidosis type   I, and systemic immune compromise. No obstructing urinary calculi. Abdomen: The lung bases are clear. The heart size is normal. The unenhanced   distal esophagus, stomach, duodenum, liver, gallbladder, pancreas, spleen, and   adrenals are normal.       Pelvis: The unenhanced small bowel, ileocecal junction, appendix, colon, and   bladder are normal. No free air or fluid, and no abdominopelvic lymphadenopathy. The lumbar canal is congenitally narrow, measuring 11 mm at L3. There is canal   and bilateral neural foraminal stenosis L4-S1. CXR Results  (Last 48 hours)    None            Medical Decision Making   I am the first provider for this patient. I reviewed the vital signs, available nursing notes, past medical history, past surgical history, family history and social history. Vital Signs-Reviewed the patient's vital signs.   Patient Vitals for the past 12 hrs:   Temp Pulse Resp BP SpO2   06/28/22 1728 98 °F (36.7 °C) 85 16 (!) 149/84 100 %         Records Reviewed: Nursing Notes and Old Medical Records      Provider Notes (Medical Decision Making):   DDx: muscle strain, degenerative disc disease, herniated disc, ureteral stone, pyelonephritis    Patient presents with right-sided back pain that is gradually worsened over 3 weeks. She has no radicular symptoms. She has no back pain red flag signs and neurologic exam is intact, do not suspect acute cord compression nor spinal epidural abscess. Labs are reassuring. Urinalysis shows no signs of infection. CT of the abdomen and pelvis shows medullary nephrocalcinosis without obstructing calculi. It also shows spinal stenosis, which I think is contributing to her pain. We will give referral to spinal specialist in regards to her back pain as well as urologist for management of kidney stones. Discussed return precautions. Case discussed with Dr. Mars Olivares, supervising physician, who agrees with plan. ED Course:   Initial assessment performed. The patients presenting problems have been discussed, and they are in agreement with the care plan formulated and outlined with them. I have encouraged them to ask questions as they arise throughout their visit. Disposition:  8:37 PM  The patient has been re-evaluated and is ready for discharge. Reviewed available results with patient. Counseled patient on diagnosis and care plan. Patient has expressed understanding, and all questions have been answered. Patient agrees with plan and agrees to follow up as recommended, or to return to the ED if their symptoms worsen. Discharge instructions have been provided and explained to the patient, along with reasons to return to the ED. PLAN:  1. Discharge Medication List as of 6/28/2022  8:37 PM      START taking these medications    Details   oxyCODONE IR (Roxicodone) 5 mg immediate release tablet Take 1 Tablet by mouth every six (6) hours as needed for Pain for up to 3 days. Max Daily Amount: 20 mg., Normal, Disp-10 Tablet, R-0         CONTINUE these medications which have NOT CHANGED    Details   dicyclomine (BENTYL) 20 mg tablet Take 1 Tablet by mouth every six (6) hours. , Normal, Disp-20 Tablet, R-0      norethindrone (MICRONOR) 0.35 mg tab TAKE 1 TABLET EVERY DAY BY ORAL ROUTE. INDICATIONS: BIRTH CONTROL, Normal, Disp-84 Tablet, R-3      busPIRone (BUSPAR) 10 mg tablet Take 1 Tab by mouth daily. , Normal, Disp-90 Tab, R-2      sertraline (ZOLOFT) 100 mg tablet Take 1 Tab by mouth daily. , Normal, Disp-30 Tab,R-6      buPROPion XL (WELLBUTRIN XL) 300 mg XL tablet Take 1 Tab by mouth every morning., Normal, Disp-30 Tab,R-6           2. Follow-up Information     Follow up With Specialties Details Why Contact Yanet Bianchi MD Urology Call  to schedule a follow up with urologist in regards to kidney stones [address] [place] [address] [address]  [address]  [phone]      Alban Trinh MD Neurosurgery Call  to schedule a follow up for further evaluation of back pain [address]  [phone]      [facility] EMERGENCY DEPT Emergency Medicine Go to  leg weakness, losing control of bowels or bladder, unable to urinate [address] [place] [address]  [phone]        Return to ED if worse     Diagnosis     Clinical Impression:   1. Acute right-sided low back pain without sciatica    2. Nephrocalcinosis    3. Spinal stenosis of lumbar region without neurogenic claudication            Serafin Salazar.  SHARMIN Garcia

## 2022-06-29 NOTE — ED NOTES
I have reviewed verbal and written discharge instructions with the patient. The patient verbalized understanding. Iv removed. Pt alert and ambulatory upon d/c.

## 2023-03-09 NOTE — PROGRESS NOTES
"Anesthesia Transfer of Care Note    Patient: Patricia Bradford    Procedure(s) Performed: Procedure(s) (LRB):  COLON (N/A)  COLONOSCOPY, WITH 1 OR MORE BIOPSIES    Patient location: GI    Anesthesia Type: MAC    Transport from OR: Transported from OR on room air with adequate spontaneous ventilation    Post pain: adequate analgesia    Post assessment: no apparent anesthetic complications and tolerated procedure well    Post vital signs: stable    Level of consciousness: awake and alert    Nausea/Vomiting: no nausea/vomiting    Complications: none    Transfer of care protocol was followed      Last vitals:   Visit Vitals  /79 (BP Location: Left arm, Patient Position: Lying)   Pulse 78   Temp 36 °C (96.8 °F) (Temporal)   Resp 13   Ht 5' 1" (1.549 m)   Wt 121.6 kg (268 lb)   SpO2 97%   BMI 50.64 kg/m²     " Post-Operative  Day 1    [facility]     Assessment: Post-Op day 1, stable    Plan:   1. Routine post-operative care       Information for the patient's :  Mick Queen [[id number]]   , Low Transverse   Patient doing well without significant complaint. Nausea and vomiting resolved, tolerating reg diet, no flatus yet, urban out and voiding w/ no difficulty. Ambulating in rm  breast      Vitals:  Visit Vitals    /68    Pulse 77    Temp 97.9 °F (36.6 °C)    Resp 18    Ht 5' 7.5\" (1.715 m)    Wt 97.5 kg (215 lb)    SpO2 96%    Breastfeeding Unknown    BMI 33.18 kg/m2     Temp (24hrs), Av °F (36.7 °C), Min:97.6 °F (36.4 °C), Max:98.4 °F (36.9 °C)      Last 24hr Input/Output:    Intake/Output Summary (Last 24 hours) at 17 0852  Last data filed at 17 2105   Gross per 24 hour   Intake             2000 ml   Output              775 ml   Net             1225 ml          Exam:        Patient without distress. Lungs clear. Abdomen, bowel sounds present, soft, expected tenderness, fundus firm Wound dressing intact     Perineum normal lochia noted               Lower extremities are negative for swelling, cords or tenderness.     Labs:   Lab Results   Component Value Date/Time    WBC 12.6 2017 12:09 PM    WBC 11.4 2017 08:34 AM    WBC 15.9 10/14/2015 08:45 AM    WBC 16.9 10/13/2015 02:18 AM    WBC 16.0 10/12/2015 06:40 PM    WBC 13.7 10/12/2015 06:08 AM    WBC 14.1 10/11/2015 06:00 PM    WBC 16.9 10/11/2015 06:20 AM    WBC 20.1 10/10/2015 05:15 PM    WBC 12.2 10/10/2015 05:00 AM    WBC 9.8 10/09/2015 06:30 PM    WBC 10.6 10/06/2015 07:05 PM    HGB 10.2 2017 04:50 AM    HGB 11.7 2017 12:09 PM    HGB 12.1 2017 08:34 AM    HGB 9.4 10/14/2015 08:45 AM    HGB 9.0 10/13/2015 02:18 AM    HGB 9.8 10/12/2015 06:40 PM    HGB 9.9 10/12/2015 06:08 AM    HGB 10.9 10/11/2015 06:00 PM    HGB 10.3 10/11/2015 06:20 AM    HGB 12.1 10/10/2015 05:15 PM HGB 11.8 10/10/2015 05:00 AM    HGB 11.8 10/09/2015 06:30 PM    HGB 12.4 10/06/2015 07:05 PM    HCT 30.6 06/02/2017 04:50 AM    HCT 34.3 06/01/2017 12:09 PM    HCT 35.3 06/01/2017 08:34 AM    HCT 27.5 10/14/2015 08:45 AM    HCT 26.7 10/13/2015 02:18 AM    HCT 28.9 10/12/2015 06:40 PM    HCT 29.2 10/12/2015 06:08 AM    HCT 31.9 10/11/2015 06:00 PM    HCT 30.2 10/11/2015 06:20 AM    HCT 35.3 10/10/2015 05:15 PM    HCT 34.9 10/10/2015 05:00 AM    HCT 34.2 10/09/2015 06:30 PM    HCT 36.4 10/06/2015 07:05 PM    PLATELET 71 05/12/5012 12:09 PM    PLATELET 67 19/37/2791 08:34 AM    PLATELET 215 24/74/6951 08:45 AM    PLATELET 592 19/73/2186 02:18 AM    PLATELET 876 26/16/9809 06:40 PM    PLATELET 306 84/09/8854 06:08 AM    PLATELET 052 73/84/0306 06:00 PM    PLATELET 99 14/40/3932 06:20 AM    PLATELET 662 36/75/6051 05:15 PM    PLATELET 79 97/96/5360 05:00 AM    PLATELET 80 67/57/6495 06:30 PM    PLATELET 92 38/79/1452 07:05 PM       Recent Results (from the past 24 hour(s))   CBC WITH AUTOMATED DIFF    Collection Time: 06/01/17 12:09 PM   Result Value Ref Range    WBC 12.6 4.6 - 13.2 K/uL    RBC 3.64 (L) 4.20 - 5.30 M/uL    HGB 11.7 (L) 12.0 - 16.0 g/dL    HCT 34.3 (L) 35.0 - 45.0 %    MCV 94.2 74.0 - 97.0 FL    MCH 32.1 24.0 - 34.0 PG    MCHC 34.1 31.0 - 37.0 g/dL    RDW 14.3 11.6 - 14.5 %    PLATELET 71 (L) 380 - 420 K/uL    MPV 10.6 9.2 - 11.8 FL    NEUTROPHILS 89 (H) 40 - 73 %    LYMPHOCYTES 8 (L) 21 - 52 %    MONOCYTES 2 (L) 3 - 10 %    EOSINOPHILS 1 0 - 5 %    BASOPHILS 0 0 - 2 %    ABS. NEUTROPHILS 11.3 (H) 1.8 - 8.0 K/UL    ABS. LYMPHOCYTES 1.0 0.9 - 3.6 K/UL    ABS. MONOCYTES 0.2 0.05 - 1.2 K/UL    ABS. EOSINOPHILS 0.1 0.0 - 0.4 K/UL    ABS.  BASOPHILS 0.0 0.0 - 0.06 K/UL    DF AUTOMATED     HEMOGLOBIN    Collection Time: 06/02/17  4:50 AM   Result Value Ref Range    HGB 10.2 (L) 12.0 - 16.0 g/dL   HEMATOCRIT    Collection Time: 06/02/17  4:50 AM   Result Value Ref Range    HCT 30.6 (L) 35.0 - 45.0 %     Rosa Gatica Tushar Byers  6/2/2017  8:52 AM

## (undated) DEVICE — INTENDED FOR TISSUE SEPARATION, AND OTHER PROCEDURES THAT REQUIRE A SHARP SURGICAL BLADE TO PUNCTURE OR CUT.: Brand: BARD-PARKER ® CARBON RIB-BACK BLADES

## (undated) DEVICE — (D)SYR 10ML 1/5ML GRAD NSAF -- PKGING CHANGE USE ITEM 338027

## (undated) DEVICE — NEEDLE HYPO 25GA L1.5IN BLU POLYPR HUB S STL REG BVL STR

## (undated) DEVICE — (D)STRIP SKN CLSR 0.5X4IN WHT --

## (undated) DEVICE — DEVON™ KNEE AND BODY STRAP 60" X 3" (1.5 M X 7.6 CM): Brand: DEVON

## (undated) DEVICE — KENDALL SCD EXPRESS SLEEVES, KNEE LENGTH, MEDIUM: Brand: KENDALL SCD

## (undated) DEVICE — ARYGLE SUCTION CATHETER WITH STRAIGHT CONNECTOR COIL PACKED 10 FR/ CH: Brand: ARGYLE

## (undated) DEVICE — SUTURE VCRL SZ 1 L36IN ABSRB UD CTX L48MM 1/2 CIR J977H

## (undated) DEVICE — MASTISOL ADHESIVE LIQ 2/3ML

## (undated) DEVICE — 3L THIN WALL CAN: Brand: CRD

## (undated) DEVICE — REM POLYHESIVE ADULT PATIENT RETURN ELECTRODE: Brand: VALLEYLAB

## (undated) DEVICE — PACK PROCEDURE SURG BIRTH

## (undated) DEVICE — Z DISCONTINUED USE 2219801 STAPLER SKIN REG CRWN L5.7MM LEG L3.9MM WIRE DIA0.53MM PROX